# Patient Record
Sex: MALE | Race: WHITE | NOT HISPANIC OR LATINO | Employment: FULL TIME | URBAN - METROPOLITAN AREA
[De-identification: names, ages, dates, MRNs, and addresses within clinical notes are randomized per-mention and may not be internally consistent; named-entity substitution may affect disease eponyms.]

---

## 2018-06-28 ENCOUNTER — OFFICE VISIT (OUTPATIENT)
Dept: SURGERY | Facility: CLINIC | Age: 33
End: 2018-06-28
Payer: COMMERCIAL

## 2018-06-28 VITALS
WEIGHT: 195 LBS | HEIGHT: 71 IN | BODY MASS INDEX: 27.3 KG/M2 | DIASTOLIC BLOOD PRESSURE: 98 MMHG | SYSTOLIC BLOOD PRESSURE: 187 MMHG

## 2018-06-28 DIAGNOSIS — K61.1 PERIRECTAL ABSCESS: Primary | ICD-10-CM

## 2018-06-28 PROCEDURE — 99024 POSTOP FOLLOW-UP VISIT: CPT | Mod: ,,, | Performed by: SURGERY

## 2018-06-28 RX ORDER — HYDROCODONE BITARTRATE AND ACETAMINOPHEN 5; 325 MG/1; MG/1
TABLET ORAL
Refills: 0 | COMMUNITY
Start: 2018-06-25 | End: 2020-06-12 | Stop reason: ALTCHOICE

## 2018-07-17 NOTE — PROGRESS NOTES
Subjective:       Patient ID: Irving Tanner is a 33 y.o. male.    Chief Complaint: Post-op Evaluation (FU DOS 6/22/18- I&D Perirectal abscess )      HPI:  Patient is status post incision and drainage of perirectal abscess. He is feeling better. Drainage is much less. He is afebrile    Review of Systems   Constitutional: Negative for appetite change, chills, fever and unexpected weight change.   HENT: Negative for hearing loss, rhinorrhea, sore throat and voice change.    Eyes: Negative for photophobia and visual disturbance.   Respiratory: Negative for cough, choking and shortness of breath.    Cardiovascular: Negative for chest pain, palpitations and leg swelling.   Gastrointestinal: Negative for abdominal pain, blood in stool, constipation, diarrhea, nausea and vomiting.   Endocrine: Negative for cold intolerance, heat intolerance, polydipsia and polyuria.   Musculoskeletal: Negative for arthralgias, back pain, joint swelling and neck stiffness.   Skin: Positive for wound. Negative for color change, pallor and rash.   Neurological: Negative for dizziness, seizures, syncope and headaches.   Hematological: Negative for adenopathy. Does not bruise/bleed easily.   Psychiatric/Behavioral: Negative for agitation, behavioral problems and confusion.       Objective:      Physical Exam   Constitutional: He is oriented to person, place, and time. He is cooperative. No distress.   Pulmonary/Chest: Effort normal. No respiratory distress.   Neurological: He is alert and oriented to person, place, and time.   Skin:   Wound looks like it's healing well. Tenderness is resolved. There is no evidence of infection, hematoma or seroma.        Assessment/Plan:   Perirectal abscess      Drains removed. Doing well. Return to clinic PRN     Follow-up if symptoms worsen or fail to improve.

## 2019-03-28 ENCOUNTER — OCCUPATIONAL HEALTH (OUTPATIENT)
Dept: URGENT CARE | Facility: CLINIC | Age: 34
End: 2019-03-28

## 2019-03-28 PROCEDURE — 99499 COAST GUARD PHYSICAL: ICD-10-PCS | Mod: S$GLB,,, | Performed by: NURSE PRACTITIONER

## 2019-03-28 PROCEDURE — 99499 UNLISTED E&M SERVICE: CPT | Mod: S$GLB,,, | Performed by: NURSE PRACTITIONER

## 2020-06-11 ENCOUNTER — HOSPITAL ENCOUNTER (EMERGENCY)
Facility: HOSPITAL | Age: 35
Discharge: LEFT BEFORE DISCHARGE | End: 2020-06-12
Attending: EMERGENCY MEDICINE
Payer: COMMERCIAL

## 2020-06-11 DIAGNOSIS — Z53.29 LEFT AGAINST MEDICAL ADVICE: Primary | ICD-10-CM

## 2020-06-11 DIAGNOSIS — R10.13 EPIGASTRIC PAIN: ICD-10-CM

## 2020-06-11 DIAGNOSIS — M62.82 NON-TRAUMATIC RHABDOMYOLYSIS: ICD-10-CM

## 2020-06-11 LAB
ALBUMIN SERPL BCP-MCNC: 5 G/DL (ref 3.5–5.2)
ALP SERPL-CCNC: 56 U/L (ref 55–135)
ALT SERPL W/O P-5'-P-CCNC: 26 U/L (ref 10–44)
AMPHET+METHAMPHET UR QL: NEGATIVE
ANION GAP SERPL CALC-SCNC: 13 MMOL/L (ref 8–16)
AST SERPL-CCNC: 29 U/L (ref 10–40)
BACTERIA #/AREA URNS HPF: NEGATIVE /HPF
BARBITURATES UR QL SCN>200 NG/ML: NEGATIVE
BASOPHILS # BLD AUTO: 0.07 K/UL (ref 0–0.2)
BASOPHILS NFR BLD: 0.5 % (ref 0–1.9)
BENZODIAZ UR QL SCN>200 NG/ML: NEGATIVE
BILIRUB SERPL-MCNC: 0.9 MG/DL (ref 0.1–1)
BILIRUB UR QL STRIP: ABNORMAL
BUN SERPL-MCNC: 16 MG/DL (ref 6–20)
BZE UR QL SCN: NEGATIVE
CALCIUM SERPL-MCNC: 9.7 MG/DL (ref 8.7–10.5)
CANNABINOIDS UR QL SCN: NEGATIVE
CHLORIDE SERPL-SCNC: 99 MMOL/L (ref 95–110)
CK MB SERPL-MCNC: 5.4 NG/ML (ref 0.1–6.5)
CK SERPL-CCNC: 944 U/L (ref 20–200)
CLARITY UR: CLEAR
CO2 SERPL-SCNC: 25 MMOL/L (ref 23–29)
COLOR UR: YELLOW
CREAT SERPL-MCNC: 1.3 MG/DL (ref 0.5–1.4)
CREAT UR-MCNC: 457 MG/DL (ref 23–375)
DIFFERENTIAL METHOD: ABNORMAL
EOSINOPHIL # BLD AUTO: 0.1 K/UL (ref 0–0.5)
EOSINOPHIL NFR BLD: 0.7 % (ref 0–8)
ERYTHROCYTE [DISTWIDTH] IN BLOOD BY AUTOMATED COUNT: 13.1 % (ref 11.5–14.5)
EST. GFR  (AFRICAN AMERICAN): >60 ML/MIN/1.73 M^2
EST. GFR  (NON AFRICAN AMERICAN): >60 ML/MIN/1.73 M^2
GLUCOSE SERPL-MCNC: 88 MG/DL (ref 70–110)
GLUCOSE UR QL STRIP: NEGATIVE
HCT VFR BLD AUTO: 53 % (ref 40–54)
HGB BLD-MCNC: 18.2 G/DL (ref 14–18)
HGB UR QL STRIP: NEGATIVE
HYALINE CASTS #/AREA URNS LPF: 22 /LPF
IMM GRANULOCYTES # BLD AUTO: 0.04 K/UL (ref 0–0.04)
IMM GRANULOCYTES NFR BLD AUTO: 0.3 % (ref 0–0.5)
KETONES UR QL STRIP: ABNORMAL
LEUKOCYTE ESTERASE UR QL STRIP: NEGATIVE
LIPASE SERPL-CCNC: 31 U/L (ref 4–60)
LYMPHOCYTES # BLD AUTO: 2.7 K/UL (ref 1–4.8)
LYMPHOCYTES NFR BLD: 19.9 % (ref 18–48)
MCH RBC QN AUTO: 32.8 PG (ref 27–31)
MCHC RBC AUTO-ENTMCNC: 34.3 G/DL (ref 32–36)
MCV RBC AUTO: 96 FL (ref 82–98)
MICROSCOPIC COMMENT: ABNORMAL
MONOCYTES # BLD AUTO: 0.7 K/UL (ref 0.3–1)
MONOCYTES NFR BLD: 5.5 % (ref 4–15)
NEUTROPHILS # BLD AUTO: 9.8 K/UL (ref 1.8–7.7)
NEUTROPHILS NFR BLD: 73.1 % (ref 38–73)
NITRITE UR QL STRIP: NEGATIVE
NRBC BLD-RTO: 0 /100 WBC
OPIATES UR QL SCN: NEGATIVE
PCP UR QL SCN>25 NG/ML: NEGATIVE
PH UR STRIP: 6 [PH] (ref 5–8)
PLATELET # BLD AUTO: 297 K/UL (ref 150–350)
PMV BLD AUTO: 9.6 FL (ref 9.2–12.9)
POTASSIUM SERPL-SCNC: 3.8 MMOL/L (ref 3.5–5.1)
PROT SERPL-MCNC: 8.3 G/DL (ref 6–8.4)
PROT UR QL STRIP: ABNORMAL
RBC # BLD AUTO: 5.55 M/UL (ref 4.6–6.2)
RBC #/AREA URNS HPF: 7 /HPF (ref 0–4)
SARS-COV-2 RDRP RESP QL NAA+PROBE: NEGATIVE
SODIUM SERPL-SCNC: 137 MMOL/L (ref 136–145)
SP GR UR STRIP: >1.03 (ref 1–1.03)
SQUAMOUS #/AREA URNS HPF: 2 /HPF
TOXICOLOGY INFORMATION: ABNORMAL
URN SPEC COLLECT METH UR: ABNORMAL
UROBILINOGEN UR STRIP-ACNC: ABNORMAL EU/DL
WBC # BLD AUTO: 13.35 K/UL (ref 3.9–12.7)
WBC #/AREA URNS HPF: 2 /HPF (ref 0–5)

## 2020-06-11 PROCEDURE — U0002 COVID-19 LAB TEST NON-CDC: HCPCS

## 2020-06-11 PROCEDURE — 83690 ASSAY OF LIPASE: CPT

## 2020-06-11 PROCEDURE — 96376 TX/PRO/DX INJ SAME DRUG ADON: CPT

## 2020-06-11 PROCEDURE — 25500020 PHARM REV CODE 255: Performed by: EMERGENCY MEDICINE

## 2020-06-11 PROCEDURE — 82553 CREATINE MB FRACTION: CPT

## 2020-06-11 PROCEDURE — 85025 COMPLETE CBC W/AUTO DIFF WBC: CPT

## 2020-06-11 PROCEDURE — 63600175 PHARM REV CODE 636 W HCPCS: Performed by: EMERGENCY MEDICINE

## 2020-06-11 PROCEDURE — 80053 COMPREHEN METABOLIC PANEL: CPT

## 2020-06-11 PROCEDURE — 36415 COLL VENOUS BLD VENIPUNCTURE: CPT

## 2020-06-11 PROCEDURE — 82550 ASSAY OF CK (CPK): CPT

## 2020-06-11 PROCEDURE — 93005 ELECTROCARDIOGRAM TRACING: CPT | Performed by: INTERNAL MEDICINE

## 2020-06-11 PROCEDURE — 96374 THER/PROPH/DIAG INJ IV PUSH: CPT | Mod: 59

## 2020-06-11 PROCEDURE — 81001 URINALYSIS AUTO W/SCOPE: CPT | Mod: 59

## 2020-06-11 PROCEDURE — 80307 DRUG TEST PRSMV CHEM ANLYZR: CPT

## 2020-06-11 PROCEDURE — 96375 TX/PRO/DX INJ NEW DRUG ADDON: CPT

## 2020-06-11 PROCEDURE — 99285 EMERGENCY DEPT VISIT HI MDM: CPT | Mod: 25

## 2020-06-11 PROCEDURE — 96361 HYDRATE IV INFUSION ADD-ON: CPT

## 2020-06-11 PROCEDURE — 25000003 PHARM REV CODE 250: Performed by: EMERGENCY MEDICINE

## 2020-06-11 RX ORDER — ONDANSETRON 2 MG/ML
4 INJECTION INTRAMUSCULAR; INTRAVENOUS
Status: COMPLETED | OUTPATIENT
Start: 2020-06-11 | End: 2020-06-11

## 2020-06-11 RX ORDER — MORPHINE SULFATE 2 MG/ML
2 INJECTION, SOLUTION INTRAMUSCULAR; INTRAVENOUS
Status: COMPLETED | OUTPATIENT
Start: 2020-06-11 | End: 2020-06-11

## 2020-06-11 RX ORDER — HYOSCYAMINE SULFATE 0.12 MG/1
0.12 TABLET SUBLINGUAL EVERY 4 HOURS PRN
Status: DISCONTINUED | OUTPATIENT
Start: 2020-06-11 | End: 2020-06-12 | Stop reason: HOSPADM

## 2020-06-11 RX ADMIN — IOHEXOL 100 ML: 350 INJECTION, SOLUTION INTRAVENOUS at 09:06

## 2020-06-11 RX ADMIN — ONDANSETRON HYDROCHLORIDE 4 MG: 2 SOLUTION INTRAMUSCULAR; INTRAVENOUS at 09:06

## 2020-06-11 RX ADMIN — SODIUM CHLORIDE 1000 ML: 0.9 INJECTION, SOLUTION INTRAVENOUS at 09:06

## 2020-06-11 RX ADMIN — SODIUM CHLORIDE 1000 ML: 0.9 INJECTION, SOLUTION INTRAVENOUS at 10:06

## 2020-06-11 RX ADMIN — ONDANSETRON 4 MG: 2 INJECTION INTRAMUSCULAR; INTRAVENOUS at 08:06

## 2020-06-11 RX ADMIN — HYOSCYAMINE SULFATE 0.12 MG: 0.12 TABLET ORAL at 08:06

## 2020-06-11 RX ADMIN — MORPHINE SULFATE 2 MG: 2 INJECTION, SOLUTION INTRAMUSCULAR; INTRAVENOUS at 09:06

## 2020-06-12 ENCOUNTER — HOSPITAL ENCOUNTER (EMERGENCY)
Facility: HOSPITAL | Age: 35
Discharge: HOME | End: 2020-06-12
Attending: EMERGENCY MEDICINE
Payer: COMMERCIAL

## 2020-06-12 VITALS
WEIGHT: 195 LBS | HEART RATE: 70 BPM | RESPIRATION RATE: 18 BRPM | HEIGHT: 70 IN | DIASTOLIC BLOOD PRESSURE: 86 MMHG | TEMPERATURE: 98 F | OXYGEN SATURATION: 99 % | BODY MASS INDEX: 27.92 KG/M2 | SYSTOLIC BLOOD PRESSURE: 116 MMHG

## 2020-06-12 VITALS
HEART RATE: 82 BPM | RESPIRATION RATE: 16 BRPM | BODY MASS INDEX: 27.92 KG/M2 | DIASTOLIC BLOOD PRESSURE: 78 MMHG | TEMPERATURE: 98 F | HEIGHT: 70 IN | WEIGHT: 195 LBS | OXYGEN SATURATION: 98 % | SYSTOLIC BLOOD PRESSURE: 155 MMHG

## 2020-06-12 DIAGNOSIS — R10.11 RUQ ABDOMINAL PAIN: Primary | ICD-10-CM

## 2020-06-12 LAB
ALBUMIN SERPL BCP-MCNC: 4 G/DL (ref 3.5–5.2)
ALP SERPL-CCNC: 54 U/L (ref 55–135)
ALT SERPL W/O P-5'-P-CCNC: 21 U/L (ref 10–44)
ANION GAP SERPL CALC-SCNC: 12 MMOL/L (ref 8–16)
AST SERPL-CCNC: 18 U/L (ref 10–40)
BASOPHILS # BLD AUTO: 0.07 K/UL (ref 0–0.2)
BASOPHILS NFR BLD: 0.6 % (ref 0–1.9)
BILIRUB SERPL-MCNC: 0.4 MG/DL (ref 0.1–1)
BUN SERPL-MCNC: 9 MG/DL (ref 6–20)
CALCIUM SERPL-MCNC: 8.8 MG/DL (ref 8.7–10.5)
CHLORIDE SERPL-SCNC: 104 MMOL/L (ref 95–110)
CK SERPL-CCNC: 455 U/L (ref 20–200)
CK SERPL-CCNC: 692 U/L (ref 20–200)
CO2 SERPL-SCNC: 22 MMOL/L (ref 23–29)
CREAT SERPL-MCNC: 0.9 MG/DL (ref 0.5–1.4)
DIFFERENTIAL METHOD: ABNORMAL
EOSINOPHIL # BLD AUTO: 0.1 K/UL (ref 0–0.5)
EOSINOPHIL NFR BLD: 0.9 % (ref 0–8)
ERYTHROCYTE [DISTWIDTH] IN BLOOD BY AUTOMATED COUNT: 13.1 % (ref 11.5–14.5)
EST. GFR  (AFRICAN AMERICAN): >60 ML/MIN/1.73 M^2
EST. GFR  (NON AFRICAN AMERICAN): >60 ML/MIN/1.73 M^2
GLUCOSE SERPL-MCNC: 94 MG/DL (ref 70–110)
HCT VFR BLD AUTO: 52.4 % (ref 40–54)
HGB BLD-MCNC: 17 G/DL (ref 14–18)
IMM GRANULOCYTES # BLD AUTO: 0.03 K/UL (ref 0–0.04)
IMM GRANULOCYTES NFR BLD AUTO: 0.2 % (ref 0–0.5)
LYMPHOCYTES # BLD AUTO: 2.4 K/UL (ref 1–4.8)
LYMPHOCYTES NFR BLD: 19.2 % (ref 18–48)
MCH RBC QN AUTO: 31.7 PG (ref 27–31)
MCHC RBC AUTO-ENTMCNC: 32.4 G/DL (ref 32–36)
MCV RBC AUTO: 98 FL (ref 82–98)
MONOCYTES # BLD AUTO: 0.7 K/UL (ref 0.3–1)
MONOCYTES NFR BLD: 6 % (ref 4–15)
NEUTROPHILS # BLD AUTO: 8.9 K/UL (ref 1.8–7.7)
NEUTROPHILS NFR BLD: 73.1 % (ref 38–73)
NRBC BLD-RTO: 0 /100 WBC
PLATELET # BLD AUTO: 274 K/UL (ref 150–350)
PMV BLD AUTO: 9.7 FL (ref 9.2–12.9)
POTASSIUM SERPL-SCNC: 4.9 MMOL/L (ref 3.5–5.1)
PROT SERPL-MCNC: 7.3 G/DL (ref 6–8.4)
RBC # BLD AUTO: 5.37 M/UL (ref 4.6–6.2)
SODIUM SERPL-SCNC: 138 MMOL/L (ref 136–145)
WBC # BLD AUTO: 12.21 K/UL (ref 3.9–12.7)

## 2020-06-12 PROCEDURE — 36415 COLL VENOUS BLD VENIPUNCTURE: CPT

## 2020-06-12 PROCEDURE — 82550 ASSAY OF CK (CPK): CPT

## 2020-06-12 PROCEDURE — 82550 ASSAY OF CK (CPK): CPT | Mod: 91

## 2020-06-12 PROCEDURE — 96360 HYDRATION IV INFUSION INIT: CPT

## 2020-06-12 PROCEDURE — 85025 COMPLETE CBC W/AUTO DIFF WBC: CPT

## 2020-06-12 PROCEDURE — 99283 EMERGENCY DEPT VISIT LOW MDM: CPT | Mod: 25

## 2020-06-12 PROCEDURE — 25000003 PHARM REV CODE 250: Performed by: EMERGENCY MEDICINE

## 2020-06-12 PROCEDURE — 80053 COMPREHEN METABOLIC PANEL: CPT

## 2020-06-12 RX ORDER — ACETAMINOPHEN 500 MG
1000 TABLET ORAL
Status: DISCONTINUED | OUTPATIENT
Start: 2020-06-12 | End: 2020-06-12 | Stop reason: HOSPADM

## 2020-06-12 RX ORDER — ACETAMINOPHEN 325 MG/1
650 TABLET ORAL
Status: COMPLETED | OUTPATIENT
Start: 2020-06-12 | End: 2020-06-12

## 2020-06-12 RX ORDER — MAG HYDROX/ALUMINUM HYD/SIMETH 200-200-20
30 SUSPENSION, ORAL (FINAL DOSE FORM) ORAL
Status: COMPLETED | OUTPATIENT
Start: 2020-06-12 | End: 2020-06-12

## 2020-06-12 RX ADMIN — ALUMINUM HYDROXIDE, MAGNESIUM HYDROXIDE, AND SIMETHICONE 30 ML: 200; 200; 20 SUSPENSION ORAL at 06:06

## 2020-06-12 RX ADMIN — SODIUM CHLORIDE 1000 ML: 0.9 INJECTION, SOLUTION INTRAVENOUS at 05:06

## 2020-06-12 RX ADMIN — ACETAMINOPHEN 650 MG: 325 TABLET ORAL at 05:06

## 2020-06-12 NOTE — ED PROVIDER NOTES
Encounter Date: 6/11/2020       History     Chief Complaint   Patient presents with    Abdominal Pain    Back Pain     This is a 35-year-old male who presents complaining of crampy abdominal pain and crampy right flank pain that occurred after he was working out in the sun throughout the day today.  Patient did not drink as much water as normal.  He did feel overheated.  He has had heat related cramps in the past that were similar in nature.  He reports some decreased but not painful urination.  He denies any chest pain or shortness of breath.  He denies any fever chills coughing any known COVID-19 positive contacts.  He denies headache or visual changes.  He denies any syncope or near-syncope but did feel lightheaded after working in the heat.  He denies any focal weakness numbness tingling or paresthesias.  He denies any vomiting but he had nausea and denies diarrhea.  He feels well otherwise and he denies any other problems or complaints.  Symptoms are moderate intensity.  There are no exacerbating or alleviating factors.        Review of patient's allergies indicates:   Allergen Reactions    Motrin [ibuprofen]      swelling     No past medical history on file.  Past Surgical History:   Procedure Laterality Date    APPENDECTOMY      FOOT FRACTURE SURGERY      Incision and Drainage of Perirectal Abscess  06/22/2018         No family history on file.  Social History     Tobacco Use    Smoking status: Current Every Day Smoker     Packs/day: 0.50   Substance Use Topics    Alcohol use: No     Comment: occasionally    Drug use: No     Review of Systems   Constitutional: Negative.  Negative for activity change, appetite change, chills, diaphoresis, fatigue, fever and unexpected weight change.   HENT: Negative.  Negative for congestion, dental problem, ear pain, nosebleeds, postnasal drip, rhinorrhea, sinus pressure, sinus pain, sore throat, tinnitus, trouble swallowing and voice change.    Eyes:  Negative.  Negative for pain and visual disturbance.   Respiratory: Negative.  Negative for cough, chest tightness, shortness of breath and wheezing.    Cardiovascular: Negative.  Negative for chest pain, palpitations and leg swelling.   Gastrointestinal: Negative.  Negative for abdominal distention, abdominal pain, anal bleeding, blood in stool, constipation, diarrhea, nausea, rectal pain and vomiting.   Endocrine: Negative.    Genitourinary: Negative.  Negative for difficulty urinating, dysuria, flank pain, frequency, penile pain, scrotal swelling, testicular pain and urgency.   Musculoskeletal: Negative.  Negative for arthralgias, back pain, gait problem, joint swelling, myalgias, neck pain and neck stiffness.   Skin: Negative.  Negative for color change, pallor and rash.   Neurological: Negative.  Negative for dizziness, seizures, syncope, facial asymmetry, speech difficulty, weakness, light-headedness, numbness and headaches.   Hematological: Negative.  Does not bruise/bleed easily.   Psychiatric/Behavioral: Negative.  Negative for confusion.   All other systems reviewed and are negative.      Physical Exam     Initial Vitals [06/11/20 1912]   BP Pulse Resp Temp SpO2   (!) 161/84 69 16 99 °F (37.2 °C) 99 %      MAP       --         Physical Exam    ED Course   Procedures  Labs Reviewed   CBC W/ AUTO DIFFERENTIAL - Abnormal; Notable for the following components:       Result Value    WBC 13.35 (*)     Hemoglobin 18.2 (*)     Mean Corpuscular Hemoglobin 32.8 (*)     Gran # (ANC) 9.8 (*)     Gran% 73.1 (*)     All other components within normal limits   URINALYSIS, REFLEX TO URINE CULTURE - Abnormal; Notable for the following components:    Specific Gravity, UA >1.030 (*)     Protein, UA 1+ (*)     Ketones, UA 1+ (*)     Bilirubin (UA) 1+ (*)     Urobilinogen, UA 2.0-3.0 (*)     All other components within normal limits    Narrative:     Preferred Collection Type->Urine, Clean Catch  Specimen Source->Urine   DRUG  SCREEN PANEL, URINE EMERGENCY - Abnormal; Notable for the following components:    Creatinine, Random Ur 457.0 (*)     All other components within normal limits    Narrative:     Preferred Collection Type->Urine, Clean Catch  Specimen Source->Urine   URINALYSIS MICROSCOPIC - Abnormal; Notable for the following components:    RBC, UA 7 (*)     Hyaline Casts, UA 22 (*)     All other components within normal limits    Narrative:     Preferred Collection Type->Urine, Clean Catch  Specimen Source->Urine   CK - Abnormal; Notable for the following components:     (*)     All other components within normal limits   CK - Abnormal; Notable for the following components:     (*)     All other components within normal limits   COMPREHENSIVE METABOLIC PANEL   LIPASE   CK   SARS-COV-2 RNA AMPLIFICATION, QUAL   CK-MB          Imaging Results          CT Abdomen Pelvis With Contrast (Final result)  Result time 06/11/20 21:35:36    Final result by Ace Denis MD (06/11/20 21:35:36)                 Narrative:    CT ABDOMEN PELVIS WITH IV CONTRAST, CT CHEST ANGIOGRAPHY WITH IV CONTRAST three-dimensional reconstructions    CLINICAL STATEMENT: Abdominal distension    This exam was performed according to our departmental dose-optimization program which includes automated exposure control, adjustment of the mA and/or kVp according to patient size and/or use of iterative reconstruction technique where applicable.    FINDINGS: Pulmonary arteries are well opacified with no significant filling defects in the pulmonary arterial tree to suggest acute pulmonary embolism. Aorta is within normal limits with no aneurysm or dissection. No significant mediastinal, hilar or axillary lymphadenopathy. No pleural or pericardial effusions. Visualized upper abdominal organs are within normal limits.    Evaluation of the lung parenchyma demonstrates trachea and major airways to be patent. No suspicious lung nodules or masses. No  consolidations to suggest pneumonia. Aorta is within normal limits.    Liver, spleen, pancreas, gallbladder, adrenal glands and kidneys are within normal limits. No hydronephrosis. No dilated loops of bowel to suggest obstruction. Mild amount of stool in the colon. Mild diffuse colonic diverticulosis without CT evidence for acute diverticulitis. No free fluid or free air. Bladder is unremarkable. No abdominal or pelvic lymphadenopathy. Abdominal aorta is within normal limits.    Osseous structures do not demonstrate acute abnormalities.    IMPRESSION:    No acute pulmonary embolism. No acute chest, or abdominal pathology.    Electronically signed by:  Ace Denis MD  6/11/2020 10:09 PM CDT Workstation: [id number]                             CTA Chest Non-Coronary (PE Study) (Final result)  Result time 06/11/20 21:03:00    Final result by Ace Denis MD (06/11/20 21:03:00)                 Narrative:    CT ABDOMEN PELVIS WITH IV CONTRAST, CT CHEST ANGIOGRAPHY WITH IV CONTRAST three-dimensional reconstructions    CLINICAL STATEMENT: Abdominal distension    This exam was performed according to our departmental dose-optimization program which includes automated exposure control, adjustment of the mA and/or kVp according to patient size and/or use of iterative reconstruction technique where applicable.    FINDINGS: Pulmonary arteries are well opacified with no significant filling defects in the pulmonary arterial tree to suggest acute pulmonary embolism. Aorta is within normal limits with no aneurysm or dissection. No significant mediastinal, hilar or axillary lymphadenopathy. No pleural or pericardial effusions. Visualized upper abdominal organs are within normal limits.    Evaluation of the lung parenchyma demonstrates trachea and major airways to be patent. No suspicious lung nodules or masses. No consolidations to suggest pneumonia. Aorta is within normal limits.    Liver, spleen, pancreas, gallbladder, adrenal glands  and kidneys are within normal limits. No hydronephrosis. No dilated loops of bowel to suggest obstruction. Mild amount of stool in the colon. Mild diffuse colonic diverticulosis without CT evidence for acute diverticulitis. No free fluid or free air. Bladder is unremarkable. No abdominal or pelvic lymphadenopathy. Abdominal aorta is within normal limits.    Osseous structures do not demonstrate acute abnormalities.    IMPRESSION:    No acute pulmonary embolism. No acute chest, or abdominal pathology.    Electronically signed by:  Ace Denis MD  6/11/2020 10:09 PM CDT Workstation: [id number]                               Medical Decision Making:   Clinical Tests:   Lab Tests: Reviewed  Radiological Study: Reviewed  Medical Tests: Reviewed  ED Management:  CT the abdomen pelvis is negative for evidence of acute intra-abdominal abnormality.  CT of the chest is negative for acute lung abnormality or pulmonary embolism.  Patient with elevated CPK which has improved with IV fluids however the patient is still symptomatic.  Hemoglobin and hematocrit are elevated in conjunction with dehydration and hemoconcentration.  Patient is currently refusing any further care or treatment.  He is awake alert and oriented.  I have explained findings of possible early rhabdomyolysis.  I have explained the need for admission to the hospital for continued care and treatment.  Patient has received IV morphine for pain.  He is awake alert and oriented.  He has a normal mental status exam and no evidence of intoxication.  Risk of refusal and leaving against medical advice have been explained in detail including but not exclusive of permanent disability, kidney failure, brain damage, chronic pain heart attack and death.  Patient voices understanding.  I have urged the patient to return at any time if he changes his mind regarding our care and I have urged the patient to follow up closely outpatient with his primary care physician or have  referred him to [facility].  I have also discussed need for aggressive hydration at home and avoidance of heat exposure.  Again the patient is leaving against medical advice despite voicing understanding of the risks that have been explained and is demonstrating adequate decision making capabilities to make this decision                                 Clinical Impression:       ICD-10-CM ICD-9-CM   1. Left against medical advice Z53.20 V64.2   2. Epigastric pain R10.13 789.06   3. Non-traumatic rhabdomyolysis M62.82 728.88                                Cherrie Cary MD  06/12/20 0820

## 2020-06-12 NOTE — ED NOTES
Patient identifiers for Irving Tanner checked and correct.  LOC:  Irving Tanner is awake, alert, and aware of environment with an appropriate affect. He is oriented x 3 and speaking appropriately.  APPEARANCE:  He is resting comfortably and in no acute distress. He is clean and well groomed, patient's clothing is properly fastened.  SKIN:  The skin is warm and dry. He has normal skin turgor and moist mucus membranes. Skin is intact; no bruising or breakdown noted. Patient is very sun burned.  MUSCULOSKELETAL:  He is moving all extremities well, no obvious deformities noted. Pulses intact.   RESPIRATORY:  Airway is open and patent. Respirations are spontaneous and non-labored with normal effort and rate.  CARDIAC:  He has a normal rate and rhythm. No peripheral edema noted. Capillary refill < 3 seconds.  ABDOMEN:  No distention noted.  Soft and non-tender upon palpation, except for the upper and lower right quad. Normal bowel sound heard through out all 4 quads. Patient states that he has had sharp pain for the last 4 days.  NEUROLOGICAL:  PERRL. Facial expression is symmetrical. Hand grasps are equal bilaterally. Normal sensation in all extremities when touched with finger.  Allergies reported:    Review of patient's allergies indicates:   Allergen Reactions    Motrin [ibuprofen]      swelling     OTHER NOTES:

## 2020-06-12 NOTE — PROVIDER PROGRESS NOTES - EMERGENCY DEPT.
Emergency Department TeleTRIAGE Encounter Note      CHIEF COMPLAINT    Chief Complaint   Patient presents with    Abdominal Pain     right side     Flank Pain     right       VITAL SIGNS   Initial Vitals [06/12/20 1535]   BP Pulse Resp Temp SpO2   116/86 70 18 98.2 °F (36.8 °C) 99 %      MAP       --            ALLERGIES    Review of patient's allergies indicates:   Allergen Reactions    Motrin [ibuprofen]      swelling       PROVIDER TRIAGE NOTE  Patient with no significant past medical history presents to the ED for evaluation of right-sided flank pain.  Patient states he has had right-sided flank pain for the past 5 days.  He denies hematuria, difficulty urinating, nausea, vomiting, diarrhea.  Patient was seen at side at [facility] last night with extensive workup including labs and CTA of his chest as well as a CT abdomen and pelvis with contrast.  Initial CK was 944 ended improved to 692 after 2 L of IV fluids.  Patient left AMA at that point.    Patient states his pain has not improved since being seen last night.  He has not taken any medications to help with his symptoms.    ORDERS  Labs Reviewed - No data to display    ED Orders (720h ago, onward)    Start Ordered     Status Ordering Provider    06/12/20 1706 06/12/20 1705  Complete Blood Count (CBC)  STAT      Pending Collection KRISTIN SNOW G.    06/12/20 1706 06/12/20 1705  Comprehensive Metabolic Panel (CMP)  STAT      Pending Collection KRISTIN SNOW G.    06/12/20 1706 06/12/20 1705  Insert Saline lock IV  Once      Ordered KRISTIN SNOW G.    06/12/20 1706 06/12/20 1705  CPK  STAT      Pending Collection KRISTIN SNOW G.    06/12/20 1706 06/12/20 1705  Urinalysis, Reflex to Urine Culture Urine, Clean Catch  STAT      Ordered KRISTIN SNOW G.            Virtual Visit Note: The provider triage portion of this emergency department evaluation and documentation was performed via Personally, a HIPAA-compliant telemedicine application, in concert with a  tele-presenter in the room. A face to face patient evaluation with one of my colleagues will occur once the patient is placed in an emergency department room.      DISCLAIMER: This note was prepared with [employer] voice recognition transcription software. Garbled syntax, mangled pronouns, and other bizarre constructions may be attributed to that software system.

## 2020-06-12 NOTE — DISCHARGE INSTRUCTIONS
You are Leaving against medical advice.  You are risking permanent disability, kidney damage, other organ damage chronic pain, brain damage and death.  Return at any time if you change your mind regarding our care and recommendations.  Avoid any overheating or heat exposure.  Keep well hydrated--drink 8 glasses of water daily.  Tylenol over-the-counter as directed if needed for pain.  Important to see your primary care physician or access Health tomorrow.  Return at any time if you change her mind regarding our care or if you have any problems or concerns.

## 2021-05-10 ENCOUNTER — HOSPITAL ENCOUNTER (EMERGENCY)
Facility: HOSPITAL | Age: 36
Discharge: HOME | End: 2021-05-10
Attending: EMERGENCY MEDICINE
Payer: COMMERCIAL

## 2021-05-10 VITALS
HEIGHT: 71 IN | SYSTOLIC BLOOD PRESSURE: 188 MMHG | TEMPERATURE: 99 F | WEIGHT: 195 LBS | HEART RATE: 58 BPM | BODY MASS INDEX: 27.3 KG/M2 | RESPIRATION RATE: 16 BRPM | OXYGEN SATURATION: 96 % | DIASTOLIC BLOOD PRESSURE: 87 MMHG

## 2021-05-10 DIAGNOSIS — M77.8 ENTHESOPATHY OF FOOT: ICD-10-CM

## 2021-05-10 DIAGNOSIS — R52 PAIN: ICD-10-CM

## 2021-05-10 DIAGNOSIS — M79.671 PAIN OF BOTH HEELS: Primary | ICD-10-CM

## 2021-05-10 DIAGNOSIS — M79.672 PAIN OF BOTH HEELS: Primary | ICD-10-CM

## 2021-05-10 DIAGNOSIS — R03.0 ELEVATED BLOOD PRESSURE READING: ICD-10-CM

## 2021-05-10 PROCEDURE — 96372 THER/PROPH/DIAG INJ SC/IM: CPT

## 2021-05-10 PROCEDURE — 63600175 PHARM REV CODE 636 W HCPCS: Performed by: NURSE PRACTITIONER

## 2021-05-10 PROCEDURE — 25000003 PHARM REV CODE 250: Performed by: NURSE PRACTITIONER

## 2021-05-10 PROCEDURE — 99284 EMERGENCY DEPT VISIT MOD MDM: CPT | Mod: 25

## 2021-05-10 RX ORDER — DEXAMETHASONE SODIUM PHOSPHATE 4 MG/ML
8 INJECTION, SOLUTION INTRA-ARTICULAR; INTRALESIONAL; INTRAMUSCULAR; INTRAVENOUS; SOFT TISSUE
Status: COMPLETED | OUTPATIENT
Start: 2021-05-10 | End: 2021-05-10

## 2021-05-10 RX ORDER — METHYLPREDNISOLONE 4 MG/1
TABLET ORAL
Qty: 1 PACKAGE | Refills: 0 | Status: SHIPPED | OUTPATIENT
Start: 2021-05-10 | End: 2021-05-17

## 2021-05-10 RX ORDER — AMLODIPINE BESYLATE 5 MG/1
10 TABLET ORAL
Status: COMPLETED | OUTPATIENT
Start: 2021-05-10 | End: 2021-05-10

## 2021-05-10 RX ORDER — TRAMADOL HYDROCHLORIDE 50 MG/1
50 TABLET ORAL EVERY 6 HOURS PRN
Qty: 10 TABLET | Refills: 0 | Status: SHIPPED | OUTPATIENT
Start: 2021-05-10 | End: 2021-05-17

## 2021-05-10 RX ORDER — AMLODIPINE BESYLATE 10 MG/1
10 TABLET ORAL DAILY
Qty: 30 TABLET | Refills: 0 | Status: SHIPPED | OUTPATIENT
Start: 2021-05-10 | End: 2021-05-17 | Stop reason: ALTCHOICE

## 2021-05-10 RX ORDER — TRAMADOL HYDROCHLORIDE 50 MG/1
50 TABLET ORAL
Status: COMPLETED | OUTPATIENT
Start: 2021-05-10 | End: 2021-05-10

## 2021-05-10 RX ADMIN — DEXAMETHASONE SODIUM PHOSPHATE 8 MG: 4 INJECTION, SOLUTION INTRA-ARTICULAR; INTRALESIONAL; INTRAMUSCULAR; INTRAVENOUS; SOFT TISSUE at 02:05

## 2021-05-10 RX ADMIN — TRAMADOL HYDROCHLORIDE 50 MG: 50 TABLET, FILM COATED ORAL at 01:05

## 2021-05-10 RX ADMIN — AMLODIPINE BESYLATE 10 MG: 5 TABLET ORAL at 02:05

## 2021-05-12 ENCOUNTER — PATIENT MESSAGE (OUTPATIENT)
Dept: RESEARCH | Facility: HOSPITAL | Age: 36
End: 2021-05-12

## 2021-05-17 ENCOUNTER — OFFICE VISIT (OUTPATIENT)
Dept: PODIATRY | Facility: CLINIC | Age: 36
End: 2021-05-17
Payer: COMMERCIAL

## 2021-05-17 ENCOUNTER — OFFICE VISIT (OUTPATIENT)
Dept: FAMILY MEDICINE | Facility: CLINIC | Age: 36
End: 2021-05-17
Payer: COMMERCIAL

## 2021-05-17 VITALS
HEIGHT: 71 IN | RESPIRATION RATE: 18 BRPM | OXYGEN SATURATION: 97 % | TEMPERATURE: 98 F | BODY MASS INDEX: 26.55 KG/M2 | SYSTOLIC BLOOD PRESSURE: 142 MMHG | DIASTOLIC BLOOD PRESSURE: 92 MMHG | HEART RATE: 95 BPM | WEIGHT: 189.63 LBS

## 2021-05-17 VITALS — BODY MASS INDEX: 26.32 KG/M2 | HEIGHT: 71 IN | WEIGHT: 188 LBS

## 2021-05-17 DIAGNOSIS — M79.672 HEEL PAIN, BILATERAL: ICD-10-CM

## 2021-05-17 DIAGNOSIS — I10 ESSENTIAL HYPERTENSION: Primary | ICD-10-CM

## 2021-05-17 DIAGNOSIS — M79.671 HEEL PAIN, BILATERAL: ICD-10-CM

## 2021-05-17 DIAGNOSIS — D23.71 BENIGN NEOPLASM OF SKIN OF RIGHT LOWER EXTREMITY, INCLUDING HIP: Primary | ICD-10-CM

## 2021-05-17 DIAGNOSIS — Z11.4 SCREENING FOR HIV WITHOUT PRESENCE OF RISK FACTORS: ICD-10-CM

## 2021-05-17 DIAGNOSIS — Z29.9 PREVENTIVE MEASURE: ICD-10-CM

## 2021-05-17 DIAGNOSIS — Z13.6 ENCOUNTER FOR LIPID SCREENING FOR CARDIOVASCULAR DISEASE: ICD-10-CM

## 2021-05-17 DIAGNOSIS — Z11.59 ENCOUNTER FOR HEPATITIS C SCREENING TEST FOR LOW RISK PATIENT: ICD-10-CM

## 2021-05-17 DIAGNOSIS — Z13.220 ENCOUNTER FOR LIPID SCREENING FOR CARDIOVASCULAR DISEASE: ICD-10-CM

## 2021-05-17 PROCEDURE — 17110 PR DESTRUCTION BENIGN LESIONS UP TO 14: ICD-10-PCS | Mod: S$GLB,,, | Performed by: PODIATRIST

## 2021-05-17 PROCEDURE — 3008F BODY MASS INDEX DOCD: CPT | Mod: S$GLB,,, | Performed by: NURSE PRACTITIONER

## 2021-05-17 PROCEDURE — 1125F PR PAIN SEVERITY QUANTIFIED, PAIN PRESENT: ICD-10-PCS | Mod: S$GLB,,, | Performed by: NURSE PRACTITIONER

## 2021-05-17 PROCEDURE — 3008F PR BODY MASS INDEX (BMI) DOCUMENTED: ICD-10-PCS | Mod: CPTII,S$GLB,, | Performed by: PODIATRIST

## 2021-05-17 PROCEDURE — 99204 PR OFFICE/OUTPT VISIT, NEW, LEVL IV, 45-59 MIN: ICD-10-PCS | Mod: S$GLB,,, | Performed by: NURSE PRACTITIONER

## 2021-05-17 PROCEDURE — 99203 OFFICE O/P NEW LOW 30 MIN: CPT | Mod: 25,S$GLB,, | Performed by: PODIATRIST

## 2021-05-17 PROCEDURE — 3077F PR MOST RECENT SYSTOLIC BLOOD PRESSURE >= 140 MM HG: ICD-10-PCS | Mod: S$GLB,,, | Performed by: NURSE PRACTITIONER

## 2021-05-17 PROCEDURE — 3080F DIAST BP >= 90 MM HG: CPT | Mod: S$GLB,,, | Performed by: NURSE PRACTITIONER

## 2021-05-17 PROCEDURE — 99203 PR OFFICE/OUTPT VISIT, NEW, LEVL III, 30-44 MIN: ICD-10-PCS | Mod: 25,S$GLB,, | Performed by: PODIATRIST

## 2021-05-17 PROCEDURE — 3077F SYST BP >= 140 MM HG: CPT | Mod: S$GLB,,, | Performed by: NURSE PRACTITIONER

## 2021-05-17 PROCEDURE — 1125F AMNT PAIN NOTED PAIN PRSNT: CPT | Mod: S$GLB,,, | Performed by: PODIATRIST

## 2021-05-17 PROCEDURE — 17110 DESTRUCTION B9 LES UP TO 14: CPT | Mod: S$GLB,,, | Performed by: PODIATRIST

## 2021-05-17 PROCEDURE — 1125F PR PAIN SEVERITY QUANTIFIED, PAIN PRESENT: ICD-10-PCS | Mod: S$GLB,,, | Performed by: PODIATRIST

## 2021-05-17 PROCEDURE — 3008F BODY MASS INDEX DOCD: CPT | Mod: CPTII,S$GLB,, | Performed by: PODIATRIST

## 2021-05-17 PROCEDURE — 3080F PR MOST RECENT DIASTOLIC BLOOD PRESSURE >= 90 MM HG: ICD-10-PCS | Mod: S$GLB,,, | Performed by: NURSE PRACTITIONER

## 2021-05-17 PROCEDURE — 1125F AMNT PAIN NOTED PAIN PRSNT: CPT | Mod: S$GLB,,, | Performed by: NURSE PRACTITIONER

## 2021-05-17 PROCEDURE — 3008F PR BODY MASS INDEX (BMI) DOCUMENTED: ICD-10-PCS | Mod: S$GLB,,, | Performed by: NURSE PRACTITIONER

## 2021-05-17 PROCEDURE — 99204 OFFICE O/P NEW MOD 45 MIN: CPT | Mod: S$GLB,,, | Performed by: NURSE PRACTITIONER

## 2021-05-17 RX ORDER — AMLODIPINE AND OLMESARTAN MEDOXOMIL 5; 20 MG/1; MG/1
1 TABLET ORAL DAILY
Qty: 90 TABLET | Refills: 1 | Status: SHIPPED | OUTPATIENT
Start: 2021-05-17 | End: 2021-11-01 | Stop reason: DRUGHIGH

## 2021-05-19 ENCOUNTER — TELEPHONE (OUTPATIENT)
Dept: FAMILY MEDICINE | Facility: CLINIC | Age: 36
End: 2021-05-19

## 2021-05-19 DIAGNOSIS — E78.2 MIXED HYPERLIPIDEMIA: Primary | ICD-10-CM

## 2021-05-19 LAB
ALBUMIN SERPL-MCNC: 4.5 G/DL (ref 4–5)
ALBUMIN/GLOB SERPL: 1.6 {RATIO} (ref 1.2–2.2)
ALP SERPL-CCNC: 54 IU/L (ref 48–121)
ALT SERPL-CCNC: 24 IU/L (ref 0–44)
APPEARANCE UR: CLEAR
AST SERPL-CCNC: 20 IU/L (ref 0–40)
BASOPHILS # BLD AUTO: 0.1 X10E3/UL (ref 0–0.2)
BASOPHILS NFR BLD AUTO: 1 %
BILIRUB SERPL-MCNC: 0.5 MG/DL (ref 0–1.2)
BILIRUB UR QL STRIP: NEGATIVE
BUN SERPL-MCNC: 13 MG/DL (ref 6–20)
BUN/CREAT SERPL: 14 (ref 9–20)
CALCIUM SERPL-MCNC: 9.2 MG/DL (ref 8.7–10.2)
CHLORIDE SERPL-SCNC: 99 MMOL/L (ref 96–106)
CHOLEST SERPL-MCNC: 222 MG/DL (ref 100–199)
CO2 SERPL-SCNC: 25 MMOL/L (ref 20–29)
COLOR UR: YELLOW
CREAT SERPL-MCNC: 0.94 MG/DL (ref 0.76–1.27)
EOSINOPHIL # BLD AUTO: 0.3 X10E3/UL (ref 0–0.4)
EOSINOPHIL NFR BLD AUTO: 3 %
ERYTHROCYTE [DISTWIDTH] IN BLOOD BY AUTOMATED COUNT: 15.4 % (ref 11.6–15.4)
GLOBULIN SER CALC-MCNC: 2.8 G/DL (ref 1.5–4.5)
GLUCOSE SERPL-MCNC: 82 MG/DL (ref 65–99)
GLUCOSE UR QL: NEGATIVE
HCT VFR BLD AUTO: 60.4 % (ref 37.5–51)
HCV AB S/CO SERPL IA: <0.1 S/CO RATIO (ref 0–0.9)
HDLC SERPL-MCNC: 42 MG/DL
HGB BLD-MCNC: 19.5 G/DL (ref 13–17.7)
HGB UR QL STRIP: NEGATIVE
HIV 1+2 AB+HIV1 P24 AG SERPL QL IA: NON REACTIVE
IMM GRANULOCYTES # BLD AUTO: 0.1 X10E3/UL (ref 0–0.1)
IMM GRANULOCYTES NFR BLD AUTO: 1 %
KETONES UR QL STRIP: NEGATIVE
LDLC SERPL CALC-MCNC: 132 MG/DL (ref 0–99)
LEUKOCYTE ESTERASE UR QL STRIP: NEGATIVE
LYMPHOCYTES # BLD AUTO: 3.4 X10E3/UL (ref 0.7–3.1)
LYMPHOCYTES NFR BLD AUTO: 30 %
MCH RBC QN AUTO: 30.7 PG (ref 26.6–33)
MCHC RBC AUTO-ENTMCNC: 32.3 G/DL (ref 31.5–35.7)
MCV RBC AUTO: 95 FL (ref 79–97)
MICRO URNS: NORMAL
MONOCYTES # BLD AUTO: 0.8 X10E3/UL (ref 0.1–0.9)
MONOCYTES NFR BLD AUTO: 7 %
NEUTROPHILS # BLD AUTO: 6.4 X10E3/UL (ref 1.4–7)
NEUTROPHILS NFR BLD AUTO: 58 %
NITRITE UR QL STRIP: NEGATIVE
PH UR STRIP: 6 [PH] (ref 5–7.5)
PLATELET # BLD AUTO: 329 X10E3/UL (ref 150–450)
POTASSIUM SERPL-SCNC: 4.9 MMOL/L (ref 3.5–5.2)
PROT SERPL-MCNC: 7.3 G/DL (ref 6–8.5)
PROT UR QL STRIP: NEGATIVE
RBC # BLD AUTO: 6.35 X10E6/UL (ref 4.14–5.8)
SODIUM SERPL-SCNC: 136 MMOL/L (ref 134–144)
SP GR UR: 1.02 (ref 1–1.03)
TRIGL SERPL-MCNC: 271 MG/DL (ref 0–149)
TSH SERPL DL<=0.005 MIU/L-ACNC: 2.48 UIU/ML (ref 0.45–4.5)
UROBILINOGEN UR STRIP-MCNC: 0.2 MG/DL (ref 0.2–1)
VLDLC SERPL CALC-MCNC: 48 MG/DL (ref 5–40)
WBC # BLD AUTO: 11.1 X10E3/UL (ref 3.4–10.8)

## 2021-05-19 RX ORDER — ROSUVASTATIN CALCIUM 10 MG/1
10 TABLET, COATED ORAL NIGHTLY
Qty: 90 TABLET | Refills: 1 | Status: SHIPPED | OUTPATIENT
Start: 2021-05-19 | End: 2021-11-01 | Stop reason: SDUPTHER

## 2021-06-01 ENCOUNTER — OFFICE VISIT (OUTPATIENT)
Dept: PODIATRY | Facility: CLINIC | Age: 36
End: 2021-06-01
Payer: COMMERCIAL

## 2021-06-01 VITALS — BODY MASS INDEX: 26.44 KG/M2 | OXYGEN SATURATION: 95 % | HEART RATE: 85 BPM | HEIGHT: 71 IN

## 2021-06-01 DIAGNOSIS — M79.672 HEEL PAIN, BILATERAL: ICD-10-CM

## 2021-06-01 DIAGNOSIS — D23.72 BENIGN NEOPLASM OF SKIN OF LEFT LOWER EXTREMITY, INCLUDING HIP: ICD-10-CM

## 2021-06-01 DIAGNOSIS — D23.71 BENIGN NEOPLASM OF SKIN OF RIGHT LOWER EXTREMITY, INCLUDING HIP: Primary | ICD-10-CM

## 2021-06-01 DIAGNOSIS — M79.671 HEEL PAIN, BILATERAL: ICD-10-CM

## 2021-06-01 PROCEDURE — 3008F BODY MASS INDEX DOCD: CPT | Mod: CPTII,S$GLB,, | Performed by: PODIATRIST

## 2021-06-01 PROCEDURE — 1125F PR PAIN SEVERITY QUANTIFIED, PAIN PRESENT: ICD-10-PCS | Mod: S$GLB,,, | Performed by: PODIATRIST

## 2021-06-01 PROCEDURE — 99213 OFFICE O/P EST LOW 20 MIN: CPT | Mod: S$GLB,,, | Performed by: PODIATRIST

## 2021-06-01 PROCEDURE — 99213 PR OFFICE/OUTPT VISIT, EST, LEVL III, 20-29 MIN: ICD-10-PCS | Mod: S$GLB,,, | Performed by: PODIATRIST

## 2021-06-01 PROCEDURE — 3008F PR BODY MASS INDEX (BMI) DOCUMENTED: ICD-10-PCS | Mod: CPTII,S$GLB,, | Performed by: PODIATRIST

## 2021-06-01 PROCEDURE — 1125F AMNT PAIN NOTED PAIN PRSNT: CPT | Mod: S$GLB,,, | Performed by: PODIATRIST

## 2021-10-05 ENCOUNTER — TELEPHONE (OUTPATIENT)
Dept: FAMILY MEDICINE | Facility: CLINIC | Age: 36
End: 2021-10-05

## 2021-10-27 ENCOUNTER — OCCUPATIONAL HEALTH (OUTPATIENT)
Dept: URGENT CARE | Facility: CLINIC | Age: 36
End: 2021-10-27

## 2021-10-27 DIAGNOSIS — Z00.00 PHYSICAL EXAM: Primary | ICD-10-CM

## 2021-10-27 PROCEDURE — 99499 UNLISTED E&M SERVICE: CPT | Mod: S$GLB,,, | Performed by: NURSE PRACTITIONER

## 2021-10-27 PROCEDURE — 99499 COAST GUARD PHYSICAL: ICD-10-PCS | Mod: S$GLB,,, | Performed by: NURSE PRACTITIONER

## 2021-11-01 ENCOUNTER — OFFICE VISIT (OUTPATIENT)
Dept: FAMILY MEDICINE | Facility: CLINIC | Age: 36
End: 2021-11-01
Payer: COMMERCIAL

## 2021-11-01 VITALS
HEART RATE: 95 BPM | BODY MASS INDEX: 26.88 KG/M2 | OXYGEN SATURATION: 96 % | TEMPERATURE: 99 F | WEIGHT: 192 LBS | DIASTOLIC BLOOD PRESSURE: 70 MMHG | SYSTOLIC BLOOD PRESSURE: 142 MMHG | HEIGHT: 71 IN

## 2021-11-01 DIAGNOSIS — I10 ESSENTIAL HYPERTENSION: ICD-10-CM

## 2021-11-01 DIAGNOSIS — E78.2 MIXED HYPERLIPIDEMIA: ICD-10-CM

## 2021-11-01 DIAGNOSIS — I49.9 CARDIAC ARRHYTHMIA, UNSPECIFIED CARDIAC ARRHYTHMIA TYPE: Primary | ICD-10-CM

## 2021-11-01 LAB
EKG 12-LEAD: NORMAL
PR INTERVAL: NORMAL
PRT AXES: NORMAL
QRS DURATION: NORMAL
QT/QTC: NORMAL
VENTRICULAR RATE: NORMAL

## 2021-11-01 PROCEDURE — 4010F ACE/ARB THERAPY RXD/TAKEN: CPT | Mod: S$GLB,,, | Performed by: NURSE PRACTITIONER

## 2021-11-01 PROCEDURE — 3077F SYST BP >= 140 MM HG: CPT | Mod: S$GLB,,, | Performed by: NURSE PRACTITIONER

## 2021-11-01 PROCEDURE — 99214 PR OFFICE/OUTPT VISIT, EST, LEVL IV, 30-39 MIN: ICD-10-PCS | Mod: 25,S$GLB,, | Performed by: NURSE PRACTITIONER

## 2021-11-01 PROCEDURE — 3077F PR MOST RECENT SYSTOLIC BLOOD PRESSURE >= 140 MM HG: ICD-10-PCS | Mod: S$GLB,,, | Performed by: NURSE PRACTITIONER

## 2021-11-01 PROCEDURE — 3078F PR MOST RECENT DIASTOLIC BLOOD PRESSURE < 80 MM HG: ICD-10-PCS | Mod: S$GLB,,, | Performed by: NURSE PRACTITIONER

## 2021-11-01 PROCEDURE — 93000 ELECTROCARDIOGRAM COMPLETE: CPT | Mod: S$GLB,,, | Performed by: NURSE PRACTITIONER

## 2021-11-01 PROCEDURE — 3078F DIAST BP <80 MM HG: CPT | Mod: S$GLB,,, | Performed by: NURSE PRACTITIONER

## 2021-11-01 PROCEDURE — 99214 OFFICE O/P EST MOD 30 MIN: CPT | Mod: 25,S$GLB,, | Performed by: NURSE PRACTITIONER

## 2021-11-01 PROCEDURE — 1160F RVW MEDS BY RX/DR IN RCRD: CPT | Mod: S$GLB,,, | Performed by: NURSE PRACTITIONER

## 2021-11-01 PROCEDURE — 93000 POCT EKG 12-LEAD: ICD-10-PCS | Mod: S$GLB,,, | Performed by: NURSE PRACTITIONER

## 2021-11-01 PROCEDURE — 4010F PR ACE/ARB THEARPY RXD/TAKEN: ICD-10-PCS | Mod: S$GLB,,, | Performed by: NURSE PRACTITIONER

## 2021-11-01 PROCEDURE — 3008F PR BODY MASS INDEX (BMI) DOCUMENTED: ICD-10-PCS | Mod: S$GLB,,, | Performed by: NURSE PRACTITIONER

## 2021-11-01 PROCEDURE — 3008F BODY MASS INDEX DOCD: CPT | Mod: S$GLB,,, | Performed by: NURSE PRACTITIONER

## 2021-11-01 PROCEDURE — 1160F PR REVIEW ALL MEDS BY PRESCRIBER/CLIN PHARMACIST DOCUMENTED: ICD-10-PCS | Mod: S$GLB,,, | Performed by: NURSE PRACTITIONER

## 2021-11-01 RX ORDER — METOPROLOL SUCCINATE 25 MG/1
25 TABLET, EXTENDED RELEASE ORAL NIGHTLY
Qty: 90 TABLET | Refills: 1 | Status: SHIPPED | OUTPATIENT
Start: 2021-11-01 | End: 2022-03-07 | Stop reason: SDUPTHER

## 2021-11-01 RX ORDER — AMLODIPINE AND OLMESARTAN MEDOXOMIL 5; 40 MG/1; MG/1
1 TABLET ORAL DAILY
Qty: 90 TABLET | Refills: 1 | Status: SHIPPED | OUTPATIENT
Start: 2021-11-01 | End: 2022-03-07 | Stop reason: SDUPTHER

## 2021-11-01 RX ORDER — ROSUVASTATIN CALCIUM 10 MG/1
10 TABLET, COATED ORAL NIGHTLY
Qty: 90 TABLET | Refills: 1 | Status: SHIPPED | OUTPATIENT
Start: 2021-11-01 | End: 2022-03-07 | Stop reason: SDUPTHER

## 2021-12-01 ENCOUNTER — OFFICE VISIT (OUTPATIENT)
Dept: FAMILY MEDICINE | Facility: CLINIC | Age: 36
End: 2021-12-01
Payer: COMMERCIAL

## 2021-12-01 VITALS
BODY MASS INDEX: 26.5 KG/M2 | TEMPERATURE: 98 F | SYSTOLIC BLOOD PRESSURE: 128 MMHG | HEART RATE: 85 BPM | RESPIRATION RATE: 16 BRPM | DIASTOLIC BLOOD PRESSURE: 70 MMHG | OXYGEN SATURATION: 97 % | HEIGHT: 71 IN | WEIGHT: 189.31 LBS

## 2021-12-01 DIAGNOSIS — I10 ESSENTIAL HYPERTENSION: ICD-10-CM

## 2021-12-01 DIAGNOSIS — I49.8 VENTRICULAR TRIGEMINY: Primary | ICD-10-CM

## 2021-12-01 DIAGNOSIS — F51.01 PRIMARY INSOMNIA: ICD-10-CM

## 2021-12-01 DIAGNOSIS — F17.210 CIGARETTE NICOTINE DEPENDENCE WITHOUT COMPLICATION: ICD-10-CM

## 2021-12-01 DIAGNOSIS — E78.2 MIXED HYPERLIPIDEMIA: ICD-10-CM

## 2021-12-01 PROCEDURE — 99214 OFFICE O/P EST MOD 30 MIN: CPT | Mod: S$GLB,,, | Performed by: NURSE PRACTITIONER

## 2021-12-01 PROCEDURE — 4010F ACE/ARB THERAPY RXD/TAKEN: CPT | Mod: S$GLB,,, | Performed by: NURSE PRACTITIONER

## 2021-12-01 PROCEDURE — 4010F PR ACE/ARB THEARPY RXD/TAKEN: ICD-10-PCS | Mod: S$GLB,,, | Performed by: NURSE PRACTITIONER

## 2021-12-01 PROCEDURE — 99214 PR OFFICE/OUTPT VISIT, EST, LEVL IV, 30-39 MIN: ICD-10-PCS | Mod: S$GLB,,, | Performed by: NURSE PRACTITIONER

## 2021-12-01 RX ORDER — TRAZODONE HYDROCHLORIDE 50 MG/1
50 TABLET ORAL NIGHTLY PRN
Qty: 90 TABLET | Refills: 0 | Status: SHIPPED | OUTPATIENT
Start: 2021-12-01 | End: 2022-02-09 | Stop reason: SDUPTHER

## 2021-12-15 ENCOUNTER — CLINICAL SUPPORT (OUTPATIENT)
Dept: SMOKING CESSATION | Facility: CLINIC | Age: 36
End: 2021-12-15

## 2021-12-15 DIAGNOSIS — F17.200 NICOTINE DEPENDENCE: Primary | ICD-10-CM

## 2021-12-15 PROCEDURE — 99404 PR PREVENT COUNSEL,INDIV,60 MIN: ICD-10-PCS | Mod: S$GLB,,,

## 2021-12-15 PROCEDURE — 99404 PREV MED CNSL INDIV APPRX 60: CPT | Mod: S$GLB,,,

## 2021-12-15 RX ORDER — BUPROPION HYDROCHLORIDE 150 MG/1
TABLET, EXTENDED RELEASE ORAL
Qty: 60 TABLET | Refills: 0 | Status: SHIPPED | OUTPATIENT
Start: 2021-12-15 | End: 2022-02-02 | Stop reason: SDUPTHER

## 2021-12-15 RX ORDER — IBUPROFEN 200 MG
1 TABLET ORAL DAILY
Qty: 28 PATCH | Refills: 0 | Status: SHIPPED | OUTPATIENT
Start: 2021-12-15 | End: 2022-03-07

## 2021-12-20 ENCOUNTER — CLINICAL SUPPORT (OUTPATIENT)
Dept: SMOKING CESSATION | Facility: CLINIC | Age: 36
End: 2021-12-20
Payer: COMMERCIAL

## 2021-12-20 DIAGNOSIS — F17.200 NICOTINE DEPENDENCE: Primary | ICD-10-CM

## 2021-12-20 PROCEDURE — 99999 PR PBB SHADOW E&M-EST. PATIENT-LVL II: ICD-10-PCS | Mod: PBBFAC,,,

## 2021-12-20 PROCEDURE — 99999 PR PBB SHADOW E&M-EST. PATIENT-LVL II: CPT | Mod: PBBFAC,,,

## 2021-12-20 PROCEDURE — 99404 PR PREVENT COUNSEL,INDIV,60 MIN: ICD-10-PCS | Mod: S$PBB,,,

## 2021-12-20 PROCEDURE — 99404 PREV MED CNSL INDIV APPRX 60: CPT | Mod: S$PBB,,,

## 2022-01-10 ENCOUNTER — CLINICAL SUPPORT (OUTPATIENT)
Dept: SMOKING CESSATION | Facility: CLINIC | Age: 37
End: 2022-01-10

## 2022-01-10 DIAGNOSIS — F17.200 NICOTINE DEPENDENCE: Primary | ICD-10-CM

## 2022-01-10 PROCEDURE — 99401 PREV MED CNSL INDIV APPRX 15: CPT | Mod: S$GLB,,,

## 2022-01-10 PROCEDURE — 99999 PR PBB SHADOW E&M-EST. PATIENT-LVL I: ICD-10-PCS | Mod: PBBFAC,,,

## 2022-01-10 PROCEDURE — 99999 PR PBB SHADOW E&M-EST. PATIENT-LVL I: CPT | Mod: PBBFAC,,,

## 2022-01-10 PROCEDURE — 99401 PR PREVENT COUNSEL,INDIV,15 MIN: ICD-10-PCS | Mod: S$GLB,,,

## 2022-01-10 NOTE — Clinical Note
I spoke to patient via telephone, as he cancelled Group session today. He is smoking about 25 cigarettes per day and I commended him on some progress. He reports that he is now taking the We;;butin 150 mg SR BID without any negative side effects at this time. He has decided not to start the nicotine at this time, he will wait and to treatment plan if needed to further his aid his quit. He remains motivated and confident with his quit efforts. We discussed and reviewed: triggers, action plan, strategies of delay, distract, & move it. The patient will remain on the prescribed tobacco cessation medication regimen of 150 mg SR  BID without any negative side effects at this time. The patient will continue to come to the clinic for additional support and encouragement.

## 2022-01-10 NOTE — PROGRESS NOTES
Individual Follow-Up Form    1/10/2022    Quit Date:     Clinical Status of Patient: Outpatient via telephone     Length of Service: 15 minutes     Continuing Medication: yes  Wellbutrin    Other Medications:      Target Symptoms: Withdrawal and medication side effects. The following were  rated moderate (3) to severe (4) on TCRS:  · Moderate (3): Desire or Crave- discussed withdrawal & habit   · Severe (4): None     Comments: I spoke to patient via telephone, as he cancelled Group session today. He is smoking about 25 cigarettes per day and I commended him on some progress. He reports that he is now taking the We;;butin 150 mg SR BID without any negative side effects at this time. He has decided not to start the nicotine at this time, he will wait and to treatment plan if needed to further his aid his quit. He remains motivated and confident with his quit efforts. We discussed and reviewed: triggers, action plan, strategies of delay, distract, & move it. The patient will remain on the prescribed tobacco cessation medication regimen of 150 mg SR  BID without any negative side effects at this time. The patient will continue to come to the clinic for additional support and encouragement.     Diagnosis: F17.200    Next Visit: 2 weeks

## 2022-02-02 ENCOUNTER — TELEPHONE (OUTPATIENT)
Dept: SMOKING CESSATION | Facility: CLINIC | Age: 37
End: 2022-02-02
Payer: COMMERCIAL

## 2022-02-02 DIAGNOSIS — F17.200 NICOTINE DEPENDENCE: ICD-10-CM

## 2022-02-02 RX ORDER — BUPROPION HYDROCHLORIDE 150 MG/1
TABLET, EXTENDED RELEASE ORAL
Qty: 60 TABLET | Refills: 0 | Status: SHIPPED | OUTPATIENT
Start: 2022-02-02 | End: 2022-03-07 | Stop reason: SDUPTHER

## 2022-02-09 ENCOUNTER — CLINICAL SUPPORT (OUTPATIENT)
Dept: SMOKING CESSATION | Facility: CLINIC | Age: 37
End: 2022-02-09

## 2022-02-09 DIAGNOSIS — F17.200 NICOTINE DEPENDENCE: Primary | ICD-10-CM

## 2022-02-09 PROCEDURE — 99402 PR PREVENT COUNSEL,INDIV,30 MIN: ICD-10-PCS | Mod: S$GLB,,,

## 2022-02-09 PROCEDURE — 99402 PREV MED CNSL INDIV APPRX 30: CPT | Mod: S$GLB,,,

## 2022-02-09 RX ORDER — DM/P-EPHED/ACETAMINOPH/DOXYLAM 30-7.5/3
2 LIQUID (ML) ORAL
Qty: 288 LOZENGE | Refills: 0 | Status: SHIPPED | OUTPATIENT
Start: 2022-02-09 | End: 2022-03-10

## 2022-02-09 NOTE — PROGRESS NOTES
Individual Follow-Up Form    2/9/2022    Quit Date:     Clinical Status of Patient: Outpatient via telephone     Length of Service: 30 minutes    Continuing Medication: yes  Wellbutrin    Other Medications:      Target Symptoms: Withdrawal and medication side effects. The following were rated moderate (3) to severe (4) on TCRS:  · Moderate (3): Desire or Crave - discussed habit & withdrawal   · Severe (4): None      Comments: I spoke to patient by phone for tobacco cessation follow up. He states that he's made some progress and he is smoking less at home and less at work. He feels he has more triggers at work so he smokes up to 20 cigarettes, while at home he smokes about 8-10 cigarettes. We discussed and reviewed: triggers, strategies of delay, distract, move it, and change of routine, interventions for overcoming boredom when at work, focused goal for the week, and treatment plan options. The patient remains on the prescribed tobacco cessation medication regimen of 150 mg SR Wellbutrin BID without any negative side effects at this time. He will add a 2 mg nicotine lozenge as needed to further aid his quit. 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     Diagnosis: F17.200    Next Visit: 2 weeks

## 2022-02-09 NOTE — Clinical Note
I spoke to patient by phone for tobacco cessation follow up. He states that he's made some progress and he is smoking less at home and less at work. He feels he has more triggers at work so he smokes up to 20 cigarettes, while at home he smokes about 8-10 cigarettes. We discussed and reviewed: triggers, strategies of delay, distract, move it, and change of routine, interventions for overcoming boredom when at work, focused goal for the week, and treatment plan options. The patient remains on the prescribed tobacco cessation medication regimen of 150 mg SR Wellbutrin BID without any negative side effects at this time. He will add a 2 mg nicotine lozenge as needed to further aid his quit. 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

## 2022-03-07 ENCOUNTER — OFFICE VISIT (OUTPATIENT)
Dept: FAMILY MEDICINE | Facility: CLINIC | Age: 37
End: 2022-03-07
Payer: COMMERCIAL

## 2022-03-07 VITALS
HEART RATE: 72 BPM | WEIGHT: 193 LBS | SYSTOLIC BLOOD PRESSURE: 124 MMHG | OXYGEN SATURATION: 97 % | BODY MASS INDEX: 27.02 KG/M2 | HEIGHT: 71 IN | RESPIRATION RATE: 18 BRPM | DIASTOLIC BLOOD PRESSURE: 78 MMHG

## 2022-03-07 DIAGNOSIS — I10 ESSENTIAL HYPERTENSION: ICD-10-CM

## 2022-03-07 DIAGNOSIS — I49.9 CARDIAC ARRHYTHMIA, UNSPECIFIED CARDIAC ARRHYTHMIA TYPE: ICD-10-CM

## 2022-03-07 DIAGNOSIS — E78.2 MIXED HYPERLIPIDEMIA: ICD-10-CM

## 2022-03-07 DIAGNOSIS — F51.01 PRIMARY INSOMNIA: ICD-10-CM

## 2022-03-07 DIAGNOSIS — F17.210 CIGARETTE NICOTINE DEPENDENCE WITHOUT COMPLICATION: ICD-10-CM

## 2022-03-07 PROCEDURE — 1160F PR REVIEW ALL MEDS BY PRESCRIBER/CLIN PHARMACIST DOCUMENTED: ICD-10-PCS | Mod: S$GLB,,, | Performed by: NURSE PRACTITIONER

## 2022-03-07 PROCEDURE — 3008F PR BODY MASS INDEX (BMI) DOCUMENTED: ICD-10-PCS | Mod: S$GLB,,, | Performed by: NURSE PRACTITIONER

## 2022-03-07 PROCEDURE — 99213 OFFICE O/P EST LOW 20 MIN: CPT | Mod: S$GLB,,, | Performed by: NURSE PRACTITIONER

## 2022-03-07 PROCEDURE — 3008F BODY MASS INDEX DOCD: CPT | Mod: S$GLB,,, | Performed by: NURSE PRACTITIONER

## 2022-03-07 PROCEDURE — 3074F SYST BP LT 130 MM HG: CPT | Mod: S$GLB,,, | Performed by: NURSE PRACTITIONER

## 2022-03-07 PROCEDURE — 99213 PR OFFICE/OUTPT VISIT, EST, LEVL III, 20-29 MIN: ICD-10-PCS | Mod: S$GLB,,, | Performed by: NURSE PRACTITIONER

## 2022-03-07 PROCEDURE — 3078F DIAST BP <80 MM HG: CPT | Mod: S$GLB,,, | Performed by: NURSE PRACTITIONER

## 2022-03-07 PROCEDURE — 1160F RVW MEDS BY RX/DR IN RCRD: CPT | Mod: S$GLB,,, | Performed by: NURSE PRACTITIONER

## 2022-03-07 PROCEDURE — 3078F PR MOST RECENT DIASTOLIC BLOOD PRESSURE < 80 MM HG: ICD-10-PCS | Mod: S$GLB,,, | Performed by: NURSE PRACTITIONER

## 2022-03-07 PROCEDURE — 3074F PR MOST RECENT SYSTOLIC BLOOD PRESSURE < 130 MM HG: ICD-10-PCS | Mod: S$GLB,,, | Performed by: NURSE PRACTITIONER

## 2022-03-07 PROCEDURE — 4010F ACE/ARB THERAPY RXD/TAKEN: CPT | Mod: S$GLB,,, | Performed by: NURSE PRACTITIONER

## 2022-03-07 PROCEDURE — 4010F PR ACE/ARB THEARPY RXD/TAKEN: ICD-10-PCS | Mod: S$GLB,,, | Performed by: NURSE PRACTITIONER

## 2022-03-07 RX ORDER — AMLODIPINE AND OLMESARTAN MEDOXOMIL 5; 40 MG/1; MG/1
1 TABLET ORAL DAILY
Qty: 90 TABLET | Refills: 1 | Status: SHIPPED | OUTPATIENT
Start: 2022-03-07 | End: 2022-05-04 | Stop reason: SDUPTHER

## 2022-03-07 RX ORDER — BUPROPION HYDROCHLORIDE 150 MG/1
150 TABLET, EXTENDED RELEASE ORAL 2 TIMES DAILY
Qty: 180 TABLET | Refills: 1 | Status: SHIPPED | OUTPATIENT
Start: 2022-03-07 | End: 2022-03-10

## 2022-03-07 RX ORDER — ROSUVASTATIN CALCIUM 10 MG/1
10 TABLET, COATED ORAL NIGHTLY
Qty: 90 TABLET | Refills: 1 | Status: SHIPPED | OUTPATIENT
Start: 2022-03-07 | End: 2022-05-04 | Stop reason: SDUPTHER

## 2022-03-07 RX ORDER — TRAZODONE HYDROCHLORIDE 100 MG/1
100 TABLET ORAL NIGHTLY PRN
Qty: 90 TABLET | Refills: 1 | Status: SHIPPED | OUTPATIENT
Start: 2022-03-07 | End: 2022-08-08 | Stop reason: SDUPTHER

## 2022-03-07 RX ORDER — METOPROLOL SUCCINATE 25 MG/1
25 TABLET, EXTENDED RELEASE ORAL NIGHTLY
Qty: 90 TABLET | Refills: 1 | Status: SHIPPED | OUTPATIENT
Start: 2022-03-07 | End: 2022-09-07 | Stop reason: SDUPTHER

## 2022-03-07 NOTE — PROGRESS NOTES
SUBJECTIVE:      Patient ID: Irving Tanner is a 37 y.o. male.    Chief Complaint: Hypertension    Pt presents for F/U HTN and PVCs (trigeminy). He continues on metoprolol and amlodipine-olmesartan daily. His BP is well controlled today. He reports he is tolerating the medication without side effects. He has stopped the energy drinks and cut back on caffeine reportedly. He continues on rosuvastatin for his cholesterol and still hasn't completed labs as ordered to recheck levels. He is unable to complete his echo or Holter monitor as recommended due to financial cost after insurance coverage. He has been compliant with his medications. Last visit, he was referred to the smoking cessation clinic and he continues to work with them. He is on BID Wellbutrin and tolerating well. He reports he doesn't smoke at home but does still smoke often at work due to boredom, reportedly. Denies CP, SOB, wheezing, fevers, nausea, vomiting, diarrhea, constipation, numbness, weakness, dizziness, palpitations, or any other concerns at this time.    Hypertension  This is a chronic problem. The current episode started more than 1 month ago. The problem has been gradually improving since onset. The problem is controlled. Pertinent negatives include no anxiety, blurred vision, chest pain, headaches, malaise/fatigue, neck pain, orthopnea, palpitations, peripheral edema, PND, shortness of breath or sweats. There are no associated agents to hypertension. Risk factors for coronary artery disease include dyslipidemia, male gender and smoking/tobacco exposure. Past treatments include calcium channel blockers, angiotensin blockers and beta blockers. The current treatment provides moderate improvement. There are no compliance problems.        Past Surgical History:   Procedure Laterality Date    APPENDECTOMY      FOOT FRACTURE SURGERY      Incision and Drainage of Perirectal Abscess  06/22/2018         Family History   Problem  Relation Age of Onset    Hypertension Mother     Diabetes Mother     Thyroid disease Mother     No Known Problems Father       Social History     Socioeconomic History    Marital status:     Number of children: 1   Tobacco Use    Smoking status: Current Every Day Smoker     Packs/day: 1.00     Years: 18.00     Pack years: 18.00     Types: Cigarettes    Smokeless tobacco: Never Used   Substance and Sexual Activity    Alcohol use: No     Comment: occasionally    Drug use: No    Sexual activity: Yes     Partners: Female     Birth control/protection: None     Current Outpatient Medications   Medication Sig Dispense Refill    nicotine polacrilex 2 MG Lozg Take 1 lozenge (2 mg total) by mouth as needed (May take up to 8-10 per day as needed Q 1-3.). 288 lozenge 0    amlodipine-olmesartan (JENY) 5-40 mg per tablet Take 1 tablet by mouth once daily. 90 tablet 1    buPROPion (WELLBUTRIN SR) 150 MG TBSR 12 hr tablet Take 1 tablet (150 mg total) by mouth 2 (two) times daily. 180 tablet 1    metoprolol succinate (TOPROL-XL) 25 MG 24 hr tablet Take 1 tablet (25 mg total) by mouth every evening. 90 tablet 1    rosuvastatin (CRESTOR) 10 MG tablet Take 1 tablet (10 mg total) by mouth every evening. 90 tablet 1    traZODone (DESYREL) 100 MG tablet Take 1 tablet (100 mg total) by mouth nightly as needed for Insomnia. 90 tablet 1     No current facility-administered medications for this visit.     Review of patient's allergies indicates:   Allergen Reactions    Motrin [ibuprofen]      swelling      Past Medical History:   Diagnosis Date    Hyperlipidemia     Hypertension      Past Surgical History:   Procedure Laterality Date    APPENDECTOMY      FOOT FRACTURE SURGERY      Incision and Drainage of Perirectal Abscess  06/22/2018           Review of Systems   Constitutional: Negative for activity change, appetite change, chills, fatigue, fever, malaise/fatigue and unexpected weight change.  "  HENT: Negative for congestion, ear pain, hearing loss, mouth sores, nosebleeds, postnasal drip, rhinorrhea, sinus pressure, sinus pain, sneezing, sore throat and trouble swallowing.    Eyes: Negative for blurred vision, pain, discharge and visual disturbance.   Respiratory: Negative for apnea, cough, chest tightness, shortness of breath, wheezing and stridor.    Cardiovascular: Negative for chest pain, palpitations, orthopnea, leg swelling and PND.   Gastrointestinal: Negative for abdominal pain, blood in stool, constipation, diarrhea, nausea and vomiting.   Endocrine: Negative for polydipsia and polyuria.   Genitourinary: Negative for difficulty urinating, dysuria, flank pain, frequency, hematuria and urgency.   Musculoskeletal: Negative for arthralgias, joint swelling, myalgias, neck pain and neck stiffness.   Skin: Negative for color change, rash and wound.   Neurological: Negative for dizziness, tremors, seizures, syncope, weakness, light-headedness, numbness and headaches.   Hematological: Negative for adenopathy.   Psychiatric/Behavioral: Negative for confusion, dysphoric mood, sleep disturbance and suicidal ideas. The patient is not nervous/anxious.       OBJECTIVE:      Vitals:    03/07/22 1256 03/07/22 1307   BP: 130/80 124/78   BP Location: Left arm Left arm   Patient Position: Sitting Sitting   BP Method: Medium (Manual)    Pulse: 72    Resp: 18    SpO2: 97%    Weight: 87.5 kg (193 lb)    Height: 5' 11" (1.803 m)      Physical Exam  Vitals reviewed.   Constitutional:       General: He is not in acute distress.     Appearance: Normal appearance. He is well-developed and overweight. He is not diaphoretic.   HENT:      Head: Normocephalic and atraumatic.      Right Ear: Hearing and external ear normal.      Left Ear: Hearing and external ear normal.      Nose: Nose normal.      Mouth/Throat:      Lips: Pink.      Mouth: Mucous membranes are moist.   Eyes:      General: Lids are normal. No scleral icterus.  "       Right eye: No discharge.         Left eye: No discharge.      Extraocular Movements: Extraocular movements intact.      Conjunctiva/sclera: Conjunctivae normal.      Pupils: Pupils are equal, round, and reactive to light.   Neck:      Thyroid: No thyroid mass or thyromegaly.      Vascular: No carotid bruit.      Trachea: Trachea and phonation normal. No tracheal deviation.   Cardiovascular:      Rate and Rhythm: Normal rate and regular rhythm.      Pulses: Normal pulses.      Heart sounds: Normal heart sounds. No murmur heard.    No friction rub. No gallop.   Pulmonary:      Effort: Pulmonary effort is normal. No respiratory distress.      Breath sounds: Normal breath sounds. No stridor. No decreased breath sounds, wheezing, rhonchi or rales.   Chest:   Breasts:      Right: No supraclavicular adenopathy.      Left: No supraclavicular adenopathy.       Abdominal:      General: Bowel sounds are normal.      Palpations: Abdomen is soft.      Tenderness: There is no abdominal tenderness.   Musculoskeletal:         General: Normal range of motion.      Cervical back: Full passive range of motion without pain, normal range of motion and neck supple.      Right lower leg: No edema.      Left lower leg: No edema.   Lymphadenopathy:      Cervical: No cervical adenopathy.      Upper Body:      Right upper body: No supraclavicular adenopathy.      Left upper body: No supraclavicular adenopathy.   Skin:     General: Skin is warm and dry.      Capillary Refill: Capillary refill takes less than 2 seconds.      Findings: No rash.   Neurological:      General: No focal deficit present.      Mental Status: He is alert and oriented to person, place, and time.      Coordination: Coordination is intact.      Gait: Gait is intact.   Psychiatric:         Attention and Perception: Attention and perception normal.         Mood and Affect: Mood and affect normal.         Speech: Speech normal.         Behavior: Behavior normal.  Behavior is cooperative.         Thought Content: Thought content normal. Thought content does not include suicidal plan.         Cognition and Memory: Cognition and memory normal.         Judgment: Judgment normal.        Assessment:       1. Essential hypertension    2. Cardiac arrhythmia, unspecified cardiac arrhythmia type    3. Mixed hyperlipidemia    4. Primary insomnia    5. Nicotine dependence        Plan:       Essential hypertension  Diagnosis is stable on current regimen. Continue current medications. Refills given as previously prescribed.  -     amlodipine-olmesartan (JENY) 5-40 mg per tablet; Take 1 tablet by mouth once daily.  Dispense: 90 tablet; Refill: 1    Cardiac arrhythmia, unspecified cardiac arrhythmia type  Controlled at this time and trigeminy not present on exam today. Regular rate and rhythm. Continue current medications. Can defer cardiac workup for now.  -     metoprolol succinate (TOPROL-XL) 25 MG 24 hr tablet; Take 1 tablet (25 mg total) by mouth every evening.  Dispense: 90 tablet; Refill: 1    Mixed hyperlipidemia  Reminded to complete labs for treatment.  -     rosuvastatin (CRESTOR) 10 MG tablet; Take 1 tablet (10 mg total) by mouth every evening.  Dispense: 90 tablet; Refill: 1    Primary insomnia  Increasing trazodone to 100mg nightly for insomnia.  -     traZODone (DESYREL) 100 MG tablet; Take 1 tablet (100 mg total) by mouth nightly as needed for Insomnia.  Dispense: 90 tablet; Refill: 1    Nicotine dependence  Continue Wellbutrin and continue to work with smoking cessation clinic. Encouraged him to find a hobby while bored at work. Can also use sugarless gum.  -     buPROPion (WELLBUTRIN SR) 150 MG TBSR 12 hr tablet; Take 1 tablet (150 mg total) by mouth 2 (two) times daily.  Dispense: 180 tablet; Refill: 1        Follow up in about 6 months (around 9/7/2022) for Annual Well Check.      3/7/2022 Meghna Mcneil, AGA, FNP

## 2022-03-09 ENCOUNTER — TELEPHONE (OUTPATIENT)
Dept: SMOKING CESSATION | Facility: CLINIC | Age: 37
End: 2022-03-09
Payer: COMMERCIAL

## 2022-03-09 NOTE — TELEPHONE ENCOUNTER
I called patient today for tobacco cessation follow up, but I had to leave message. I did leave my name and contact number ([phone]) for a return call.

## 2022-03-10 ENCOUNTER — CLINICAL SUPPORT (OUTPATIENT)
Dept: SMOKING CESSATION | Facility: CLINIC | Age: 37
End: 2022-03-10

## 2022-03-10 DIAGNOSIS — F17.200 NICOTINE DEPENDENCE: Primary | ICD-10-CM

## 2022-03-10 PROCEDURE — 99403 PREV MED CNSL INDIV APPRX 45: CPT | Mod: S$GLB,,,

## 2022-03-10 PROCEDURE — 99999 PR PBB SHADOW E&M-EST. PATIENT-LVL I: ICD-10-PCS | Mod: PBBFAC,,,

## 2022-03-10 PROCEDURE — 99999 PR PBB SHADOW E&M-EST. PATIENT-LVL I: CPT | Mod: PBBFAC,,,

## 2022-03-10 PROCEDURE — 99403 PR PREVENT COUNSEL,INDIV,45 MIN: ICD-10-PCS | Mod: S$GLB,,,

## 2022-03-10 NOTE — Clinical Note
Patient was seen today in clinic for tobacco cessation follow up. He is currently smoking up to 30 cigarettes when at work and up to 20 on his off days at home. He has used Wellbutrin 150 mg SR BID as prescribed and 2 mg nicotine lozenge as needed, without any negative side effects thus far. However, he did find this treatment plan to be non-effective for him. We discussed and reviewed: triggers, strategies and daily action plan, alternate treatment options of Varenicline, weekly goals, support system, motivation and desire to quit, setting a future quit date and continued follow up. He has weaned off the Wellbutrin as directed without difficulty and he will discontinue nicotine lozenge. He will begin taking Varenicline as directed and he will follow up in 2 weeks.

## 2022-03-10 NOTE — PROGRESS NOTES
Individual Follow-Up Form    3/10/2022    Quit Date:     Clinical Status of Patient: Outpatient    Length of Service: 45 minutes    Continuing Medication: yes  Chantix    Other Medications:      Target Symptoms: Withdrawal and medication side effects. The following were rated moderate (3) to severe (4) on TCRS:  · Moderate (3): frustrated - discussed withdrawal & habit  · Severe (4): desire or crave- discussed withdrawal &b habit     Comments: Patient was seen today in clinic for tobacco cessation follow up. He is currently smoking up to 30 cigarettes when at work and up to 20 on his off days at home. He has used Wellbutrin 150 mg SR BID as prescribed and 2 mg nicotine lozenge as needed, without any negative side effects thus far. However, he did find this treatment plan to be non-effective for him. We discussed and reviewed: triggers, strategies and daily action plan, alternate treatment options of Varenicline, weekly goals, support system, motivation and desire to quit, setting a future quit date and continued follow up. He has weaned off the Wellbutrin as directed without difficulty and he will discontinue nicotine lozenge. He will begin taking Varenicline as directed and he will follow up in 2 weeks.     Diagnosis: F17.200    Next Visit: 2 weeks

## 2022-03-13 RX ORDER — VARENICLINE TARTRATE 1 MG/1
1 TABLET, FILM COATED ORAL 2 TIMES DAILY
Qty: 56 TABLET | Refills: 0 | Status: SHIPPED | OUTPATIENT
Start: 2022-03-13 | End: 2022-04-11

## 2022-03-16 ENCOUNTER — CLINICAL SUPPORT (OUTPATIENT)
Dept: SMOKING CESSATION | Facility: CLINIC | Age: 37
End: 2022-03-16
Payer: COMMERCIAL

## 2022-03-16 DIAGNOSIS — F17.200 NICOTINE DEPENDENCE: Primary | ICD-10-CM

## 2022-03-16 PROCEDURE — 99407 BEHAV CHNG SMOKING > 10 MIN: CPT | Mod: S$GLB,,,

## 2022-03-16 PROCEDURE — 99407 PR TOBACCO USE CESSATION INTENSIVE >10 MINUTES: ICD-10-PCS | Mod: S$GLB,,,

## 2022-03-16 NOTE — PROGRESS NOTES
Spoke with patient today in regard to smoking cessation progress for 3 month telephone follow up, he states not tobacco free. Patient is currently in the program and states the start of Chantix 2 days ago to help aid in his quit attempt. Informed patient of benefit period, future follow ups, and contact information if any further help or support is needed. Will complete smart form for 3 month follow up on Quit attempt #1.

## 2022-07-06 ENCOUNTER — TELEPHONE (OUTPATIENT)
Dept: SMOKING CESSATION | Facility: CLINIC | Age: 37
End: 2022-07-06
Payer: COMMERCIAL

## 2022-09-07 ENCOUNTER — OFFICE VISIT (OUTPATIENT)
Dept: FAMILY MEDICINE | Facility: CLINIC | Age: 37
End: 2022-09-07
Payer: COMMERCIAL

## 2022-09-07 VITALS
OXYGEN SATURATION: 96 % | RESPIRATION RATE: 16 BRPM | SYSTOLIC BLOOD PRESSURE: 128 MMHG | DIASTOLIC BLOOD PRESSURE: 78 MMHG | WEIGHT: 199.19 LBS | HEIGHT: 71 IN | BODY MASS INDEX: 27.89 KG/M2 | TEMPERATURE: 98 F | HEART RATE: 87 BPM

## 2022-09-07 DIAGNOSIS — R71.8 ELEVATED HEMATOCRIT: ICD-10-CM

## 2022-09-07 DIAGNOSIS — E78.2 MIXED HYPERLIPIDEMIA: ICD-10-CM

## 2022-09-07 DIAGNOSIS — F17.210 CIGARETTE NICOTINE DEPENDENCE WITHOUT COMPLICATION: ICD-10-CM

## 2022-09-07 DIAGNOSIS — I10 ESSENTIAL HYPERTENSION: ICD-10-CM

## 2022-09-07 DIAGNOSIS — Z00.01 ANNUAL VISIT FOR GENERAL ADULT MEDICAL EXAMINATION WITH ABNORMAL FINDINGS: Primary | ICD-10-CM

## 2022-09-07 DIAGNOSIS — F51.01 PRIMARY INSOMNIA: ICD-10-CM

## 2022-09-07 DIAGNOSIS — Z23 NEED FOR TDAP VACCINATION: ICD-10-CM

## 2022-09-07 DIAGNOSIS — I49.8 VENTRICULAR TRIGEMINY: ICD-10-CM

## 2022-09-07 PROCEDURE — 1160F PR REVIEW ALL MEDS BY PRESCRIBER/CLIN PHARMACIST DOCUMENTED: ICD-10-PCS | Mod: CPTII,S$GLB,, | Performed by: NURSE PRACTITIONER

## 2022-09-07 PROCEDURE — 3074F PR MOST RECENT SYSTOLIC BLOOD PRESSURE < 130 MM HG: ICD-10-PCS | Mod: CPTII,S$GLB,, | Performed by: NURSE PRACTITIONER

## 2022-09-07 PROCEDURE — 90471 IMMUNIZATION ADMIN: CPT | Mod: S$GLB,,, | Performed by: NURSE PRACTITIONER

## 2022-09-07 PROCEDURE — 90715 TDAP VACCINE 7 YRS/> IM: CPT | Mod: S$GLB,,, | Performed by: NURSE PRACTITIONER

## 2022-09-07 PROCEDURE — 1159F MED LIST DOCD IN RCRD: CPT | Mod: CPTII,S$GLB,, | Performed by: NURSE PRACTITIONER

## 2022-09-07 PROCEDURE — 3078F DIAST BP <80 MM HG: CPT | Mod: CPTII,S$GLB,, | Performed by: NURSE PRACTITIONER

## 2022-09-07 PROCEDURE — 90471 TDAP VACCINE GREATER THAN OR EQUAL TO 7YO IM: ICD-10-PCS | Mod: S$GLB,,, | Performed by: NURSE PRACTITIONER

## 2022-09-07 PROCEDURE — 3074F SYST BP LT 130 MM HG: CPT | Mod: CPTII,S$GLB,, | Performed by: NURSE PRACTITIONER

## 2022-09-07 PROCEDURE — 1160F RVW MEDS BY RX/DR IN RCRD: CPT | Mod: CPTII,S$GLB,, | Performed by: NURSE PRACTITIONER

## 2022-09-07 PROCEDURE — 4010F PR ACE/ARB THEARPY RXD/TAKEN: ICD-10-PCS | Mod: CPTII,S$GLB,, | Performed by: NURSE PRACTITIONER

## 2022-09-07 PROCEDURE — 99395 PR PREVENTIVE VISIT,EST,18-39: ICD-10-PCS | Mod: 25,S$GLB,, | Performed by: NURSE PRACTITIONER

## 2022-09-07 PROCEDURE — 3008F PR BODY MASS INDEX (BMI) DOCUMENTED: ICD-10-PCS | Mod: CPTII,S$GLB,, | Performed by: NURSE PRACTITIONER

## 2022-09-07 PROCEDURE — 1159F PR MEDICATION LIST DOCUMENTED IN MEDICAL RECORD: ICD-10-PCS | Mod: CPTII,S$GLB,, | Performed by: NURSE PRACTITIONER

## 2022-09-07 PROCEDURE — 4010F ACE/ARB THERAPY RXD/TAKEN: CPT | Mod: CPTII,S$GLB,, | Performed by: NURSE PRACTITIONER

## 2022-09-07 PROCEDURE — 3078F PR MOST RECENT DIASTOLIC BLOOD PRESSURE < 80 MM HG: ICD-10-PCS | Mod: CPTII,S$GLB,, | Performed by: NURSE PRACTITIONER

## 2022-09-07 PROCEDURE — 3008F BODY MASS INDEX DOCD: CPT | Mod: CPTII,S$GLB,, | Performed by: NURSE PRACTITIONER

## 2022-09-07 PROCEDURE — 90715 TDAP VACCINE GREATER THAN OR EQUAL TO 7YO IM: ICD-10-PCS | Mod: S$GLB,,, | Performed by: NURSE PRACTITIONER

## 2022-09-07 PROCEDURE — 99395 PREV VISIT EST AGE 18-39: CPT | Mod: 25,S$GLB,, | Performed by: NURSE PRACTITIONER

## 2022-09-07 RX ORDER — AMLODIPINE AND OLMESARTAN MEDOXOMIL 5; 40 MG/1; MG/1
1 TABLET ORAL DAILY
Qty: 90 TABLET | Refills: 1 | Status: ON HOLD | OUTPATIENT
Start: 2022-09-07 | End: 2022-10-10 | Stop reason: HOSPADM

## 2022-09-07 RX ORDER — METOPROLOL SUCCINATE 50 MG/1
50 TABLET, EXTENDED RELEASE ORAL NIGHTLY
Qty: 90 TABLET | Refills: 1 | Status: ON HOLD | OUTPATIENT
Start: 2022-09-07 | End: 2022-10-10 | Stop reason: HOSPADM

## 2022-09-07 RX ORDER — BUPROPION HYDROCHLORIDE 150 MG/1
150 TABLET, EXTENDED RELEASE ORAL 2 TIMES DAILY
Qty: 180 TABLET | Refills: 2 | Status: SHIPPED | OUTPATIENT
Start: 2022-09-07 | End: 2023-04-18

## 2022-09-07 RX ORDER — AMITRIPTYLINE HYDROCHLORIDE 25 MG/1
25 TABLET, FILM COATED ORAL NIGHTLY PRN
Qty: 90 TABLET | Refills: 1 | Status: SHIPPED | OUTPATIENT
Start: 2022-09-07 | End: 2023-01-30

## 2022-09-07 RX ORDER — ROSUVASTATIN CALCIUM 10 MG/1
10 TABLET, COATED ORAL NIGHTLY
Qty: 90 TABLET | Refills: 1 | Status: ON HOLD | OUTPATIENT
Start: 2022-09-07 | End: 2022-10-10 | Stop reason: SDUPTHER

## 2022-09-07 NOTE — PROGRESS NOTES
SUBJECTIVE:      Patient ID: Irving Tanner is a 37 y.o. male.    Chief Complaint: Annual Exam    Pt presents for annual visit. This is a 36 yo M with mHx of HTN, HLD, ventricular trigeminy, insomnia, and he is a cigarette smoker. He does not watch his diet but reports plenty of exercise at his work on a daily basis. He continues on metoprolol and amlodipine-olmesartan daily. His BP is well controlled today. He reports he is tolerating the medication without side effects. His ventricular trigeminy unfortunately has returned and he reports he does continue to drink Bang drinks intermittently. He feels he needs the caffeine in order to continue to work as he does. Denies excessive ETOH use or any drug use. He gets drug tested frequently for his job. Denies CP, SOB, dizziness, or presyncope. He continues on rosuvastatin for his cholesterol. He continues to be unable to complete his echo or Holter monitor as recommended due to financial cost after insurance coverage. He has been compliant with his medications. He has been working with the smoking cessation clinic but finds difficulty getting to the clinic and is requesting the Wellbutrin be managed here. He was on BID Wellbutrin and tolerating well after reportedly previously failing Chantix. He reports a medication is causing him sexual dysfunction which is intolerable to him. He would like to receive the Tdap today. He is overdue for labs, never having completed them previously. He is overdue for his dental and eye visits. Denies CP, SOB, wheezing, fevers, nausea, vomiting, diarrhea, constipation, numbness, weakness, dizziness, palpitations, or any other concerns at this time.    Heart Problem  This is a recurrent problem. The current episode started more than 1 month ago. The problem occurs intermittently. The problem has been waxing and waning. Pertinent negatives include no abdominal pain, anorexia, arthralgias, change in bowel habit, chest pain, chills,  congestion, coughing, diaphoresis, fatigue, fever, headaches, joint swelling, myalgias, nausea, neck pain, numbness, rash, sore throat, swollen glands, urinary symptoms, vertigo, visual change, vomiting or weakness. Nothing aggravates the symptoms. Treatments tried: beta blockers, decreased caffeine intake. Improvement on treatment: resolved initially but recurrent.     Past Surgical History:   Procedure Laterality Date    APPENDECTOMY      FOOT FRACTURE SURGERY      Incision and Drainage of Perirectal Abscess  06/22/2018         Family History   Problem Relation Age of Onset    Hypertension Mother     Diabetes Mother     Thyroid disease Mother     No Known Problems Father       Social History     Socioeconomic History    Marital status:     Number of children: 1   Tobacco Use    Smoking status: Every Day     Packs/day: 1.00     Years: 18.00     Pack years: 18.00     Types: Cigarettes    Smokeless tobacco: Never   Substance and Sexual Activity    Alcohol use: No     Comment: occasionally    Drug use: No    Sexual activity: Yes     Partners: Female     Birth control/protection: None     Current Outpatient Medications   Medication Sig Dispense Refill    amitriptyline (ELAVIL) 25 MG tablet Take 1 tablet (25 mg total) by mouth nightly as needed for Insomnia. 90 tablet 1    amlodipine-olmesartan (JENY) 5-40 mg per tablet Take 1 tablet by mouth once daily. 90 tablet 1    buPROPion (WELLBUTRIN SR) 150 MG TBSR 12 hr tablet Take 1 tablet (150 mg total) by mouth 2 (two) times daily. 180 tablet 2    metoprolol succinate (TOPROL-XL) 50 MG 24 hr tablet Take 1 tablet (50 mg total) by mouth every evening. 90 tablet 1    rosuvastatin (CRESTOR) 10 MG tablet Take 1 tablet (10 mg total) by mouth every evening. 90 tablet 1     No current facility-administered medications for this visit.     Review of patient's allergies indicates:   Allergen Reactions    Motrin [ibuprofen]      swelling      Past Medical History:  "  Diagnosis Date    Hyperlipidemia     Hypertension      Past Surgical History:   Procedure Laterality Date    APPENDECTOMY      FOOT FRACTURE SURGERY      Incision and Drainage of Perirectal Abscess  06/22/2018           Review of Systems   Constitutional:  Negative for activity change, appetite change, chills, diaphoresis, fatigue, fever and unexpected weight change.   HENT:  Negative for congestion, ear pain, mouth sores, nosebleeds, postnasal drip, rhinorrhea, sinus pressure, sinus pain, sneezing, sore throat and trouble swallowing.    Eyes:  Negative for pain and visual disturbance.   Respiratory:  Negative for apnea, cough, chest tightness, shortness of breath, wheezing and stridor.    Cardiovascular:  Negative for chest pain, palpitations and leg swelling.   Gastrointestinal:  Negative for abdominal pain, anorexia, blood in stool, change in bowel habit, constipation, diarrhea, nausea and vomiting.   Genitourinary:  Negative for dysuria, flank pain, frequency and hematuria.   Musculoskeletal:  Negative for arthralgias, joint swelling, myalgias, neck pain and neck stiffness.   Skin:  Negative for color change, rash and wound.   Neurological:  Negative for dizziness, vertigo, tremors, seizures, syncope, weakness, light-headedness, numbness and headaches.   Hematological:  Negative for adenopathy.   Psychiatric/Behavioral:  Positive for sleep disturbance. Negative for confusion, dysphoric mood and suicidal ideas. The patient is not nervous/anxious.     OBJECTIVE:      Vitals:    09/07/22 1358 09/07/22 1432   BP: (!) 154/66 128/78   BP Location: Left arm Left arm   Patient Position: Sitting Sitting   BP Method: Medium (Manual)    Pulse: 87    Resp: 16    Temp: 98.3 °F (36.8 °C)    TempSrc: Oral    SpO2: 96%    Weight: 90.4 kg (199 lb 3.2 oz)    Height: 5' 11" (1.803 m)      Physical Exam  Vitals reviewed.   Constitutional:       General: He is not in acute distress.     Appearance: Normal appearance. He " is well-developed and overweight. He is not diaphoretic.   HENT:      Head: Normocephalic and atraumatic.      Right Ear: Hearing, tympanic membrane, ear canal and external ear normal.      Left Ear: Hearing, tympanic membrane, ear canal and external ear normal.      Nose: Nose normal. No mucosal edema, congestion or rhinorrhea.      Mouth/Throat:      Lips: Pink.      Mouth: Mucous membranes are moist.      Pharynx: Oropharynx is clear. Uvula midline. No pharyngeal swelling, oropharyngeal exudate or posterior oropharyngeal erythema.   Eyes:      General: Lids are normal. No scleral icterus.        Right eye: No discharge.         Left eye: No discharge.      Extraocular Movements: Extraocular movements intact.      Conjunctiva/sclera: Conjunctivae normal.      Pupils: Pupils are equal, round, and reactive to light.   Neck:      Thyroid: No thyroid mass or thyromegaly.      Vascular: No carotid bruit.      Trachea: Trachea and phonation normal. No tracheal deviation.   Cardiovascular:      Rate and Rhythm: Normal rate. Rhythm regularly irregular. FrequentExtrasystoles are present.     Pulses: Normal pulses.      Heart sounds: Normal heart sounds. No murmur heard.    No friction rub. No gallop.   Pulmonary:      Effort: Pulmonary effort is normal. No respiratory distress.      Breath sounds: Normal breath sounds. No stridor. No decreased breath sounds, wheezing, rhonchi or rales.   Abdominal:      General: Bowel sounds are normal.      Palpations: Abdomen is soft.      Tenderness: There is no abdominal tenderness.   Musculoskeletal:         General: Normal range of motion.      Cervical back: Full passive range of motion without pain, normal range of motion and neck supple.      Right lower leg: No edema.      Left lower leg: No edema.   Lymphadenopathy:      Cervical: No cervical adenopathy.      Upper Body:      Right upper body: No supraclavicular adenopathy.      Left upper body: No supraclavicular adenopathy.    Skin:     General: Skin is warm and dry.      Capillary Refill: Capillary refill takes less than 2 seconds.      Findings: No rash.   Neurological:      General: No focal deficit present.      Mental Status: He is alert and oriented to person, place, and time.      Coordination: Coordination is intact.      Gait: Gait is intact.   Psychiatric:         Attention and Perception: Attention and perception normal.         Mood and Affect: Mood and affect normal.         Speech: Speech normal.         Behavior: Behavior normal. Behavior is cooperative.         Thought Content: Thought content normal. Thought content does not include suicidal plan.         Cognition and Memory: Cognition and memory normal.         Judgment: Judgment normal.      Assessment:       1. Annual visit for general adult medical examination with abnormal findings    2. Essential hypertension    3. Ventricular trigeminy    4. Mixed hyperlipidemia    5. Primary insomnia    6. Cigarette nicotine dependence without complication    7. Elevated hematocrit    8. Need for Tdap vaccination        Plan:       Annual visit for general adult medical examination with abnormal findings  Instructed on guidelines recommending 150 minutes per week of brisk exercise and healthy lifestyle. Recommended well screenings, including immunizations, reviewed with patient. Discussed outstanding health maintenance and rationale for completion. Verbalized understanding.   -     CBC Auto Differential; Future; Expected date: 09/07/2022  -     Comprehensive Metabolic Panel; Future; Expected date: 09/07/2022    Essential hypertension  Diagnosis is stable on current regimen. Continue current medications. Refills given as previously prescribed.   -     Comprehensive Metabolic Panel; Future; Expected date: 09/07/2022  -     TSH; Future; Expected date: 09/07/2022  -     Urinalysis; Future; Expected date: 09/07/2022  -     amlodipine-olmesartan (JENY) 5-40 mg per tablet; Take 1  tablet by mouth once daily.  Dispense: 90 tablet; Refill: 1    Ventricular trigeminy  Since recurrent, should have referral to cardiology and instructed on importance of follow through with this referral. Also advised to get labs completed as ordered to ensure no metabolic etiology for this issue. Advised to continue to decrease caffeine intake. Increasing metoprolol to 50mg nightly.  -     Ambulatory referral/consult to Cardiology; Future; Expected date: 09/14/2022  -     TSH; Future; Expected date: 09/07/2022  -     BNP; Future; Expected date: 09/07/2022  -     metoprolol succinate (TOPROL-XL) 50 MG 24 hr tablet; Take 1 tablet (50 mg total) by mouth every evening.  Dispense: 90 tablet; Refill: 1    Mixed hyperlipidemia  -     Lipid Panel; Future; Expected date: 09/07/2022  -     rosuvastatin (CRESTOR) 10 MG tablet; Take 1 tablet (10 mg total) by mouth every evening.  Dispense: 90 tablet; Refill: 1    Primary insomnia  Trazodone likely causing sexual side effects. Instructed to wean from trazodone with 1/2 tablet nightly x 1 week then start amitriptyline for sleep aid. Attempting to avoid any controlled substances due to his occupation, possible side effects, and per pt request.  -     amitriptyline (ELAVIL) 25 MG tablet; Take 1 tablet (25 mg total) by mouth nightly as needed for Insomnia.  Dispense: 90 tablet; Refill: 1    Cigarette nicotine dependence without complication  -     buPROPion (WELLBUTRIN SR) 150 MG TBSR 12 hr tablet; Take 1 tablet (150 mg total) by mouth 2 (two) times daily.  Dispense: 180 tablet; Refill: 2    Elevated hematocrit  -     CBC Auto Differential; Future; Expected date: 09/07/2022  -     Iron and TIBC; Future; Expected date: 09/07/2022  -     Ferritin; Future; Expected date: 09/07/2022    Need for Tdap vaccination  -     (In Office Administered) Tdap Vaccine      Follow up in about 6 months (around 3/7/2023) for F/U HTN.      9/7/2022 Meghna Mcneil, AGA, FNP

## 2022-09-24 LAB
ALBUMIN SERPL-MCNC: 4.3 G/DL (ref 3.6–5.1)
ALBUMIN/GLOB SERPL: 1.5 (CALC) (ref 1–2.5)
ALP SERPL-CCNC: 47 U/L (ref 36–130)
ALT SERPL-CCNC: 20 U/L (ref 9–46)
APPEARANCE UR: CLEAR
AST SERPL-CCNC: 26 U/L (ref 10–40)
BASOPHILS # BLD AUTO: 86 CELLS/UL (ref 0–200)
BASOPHILS NFR BLD AUTO: 0.9 %
BILIRUB SERPL-MCNC: 0.5 MG/DL (ref 0.2–1.2)
BILIRUB UR QL STRIP: NEGATIVE
BNP SERPL-MCNC: 15 PG/ML
BUN SERPL-MCNC: 9 MG/DL (ref 7–25)
BUN/CREAT SERPL: NORMAL (CALC) (ref 6–22)
CALCIUM SERPL-MCNC: 9.4 MG/DL (ref 8.6–10.3)
CHLORIDE SERPL-SCNC: 104 MMOL/L (ref 98–110)
CHOLEST SERPL-MCNC: 163 MG/DL
CHOLEST/HDLC SERPL: 3.4 (CALC)
CO2 SERPL-SCNC: 29 MMOL/L (ref 20–32)
COLOR UR: NORMAL
CREAT SERPL-MCNC: 1 MG/DL (ref 0.6–1.26)
EGFR: 98 ML/MIN/1.73M2
EOSINOPHIL # BLD AUTO: 238 CELLS/UL (ref 15–500)
EOSINOPHIL NFR BLD AUTO: 2.5 %
ERYTHROCYTE [DISTWIDTH] IN BLOOD BY AUTOMATED COUNT: 15.3 % (ref 11–15)
FERRITIN SERPL-MCNC: 37 NG/ML (ref 38–380)
GLOBULIN SER CALC-MCNC: 2.8 G/DL (CALC) (ref 1.9–3.7)
GLUCOSE SERPL-MCNC: 84 MG/DL (ref 65–99)
GLUCOSE UR QL STRIP: NEGATIVE
HCT VFR BLD AUTO: 56 % (ref 38.5–50)
HDLC SERPL-MCNC: 48 MG/DL
HGB BLD-MCNC: 18.9 G/DL (ref 13.2–17.1)
HGB UR QL STRIP: NEGATIVE
IRON SATN MFR SERPL: 31 % (CALC) (ref 20–48)
IRON SERPL-MCNC: 115 MCG/DL (ref 50–180)
KETONES UR QL STRIP: NEGATIVE
LDLC SERPL CALC-MCNC: 85 MG/DL (CALC)
LEUKOCYTE ESTERASE UR QL STRIP: NEGATIVE
LYMPHOCYTES # BLD AUTO: 2337 CELLS/UL (ref 850–3900)
LYMPHOCYTES NFR BLD AUTO: 24.6 %
MCH RBC QN AUTO: 31.7 PG (ref 27–33)
MCHC RBC AUTO-ENTMCNC: 33.8 G/DL (ref 32–36)
MCV RBC AUTO: 94 FL (ref 80–100)
MONOCYTES # BLD AUTO: 874 CELLS/UL (ref 200–950)
MONOCYTES NFR BLD AUTO: 9.2 %
NEUTROPHILS # BLD AUTO: 5966 CELLS/UL (ref 1500–7800)
NEUTROPHILS NFR BLD AUTO: 62.8 %
NITRITE UR QL STRIP: NEGATIVE
NONHDLC SERPL-MCNC: 115 MG/DL (CALC)
PH UR STRIP: 6 [PH] (ref 5–8)
PLATELET # BLD AUTO: 273 THOUSAND/UL (ref 140–400)
PMV BLD REES-ECKER: 9.7 FL (ref 7.5–12.5)
POTASSIUM SERPL-SCNC: 4.5 MMOL/L (ref 3.5–5.3)
PROT SERPL-MCNC: 7.1 G/DL (ref 6.1–8.1)
PROT UR QL STRIP: NEGATIVE
RBC # BLD AUTO: 5.96 MILLION/UL (ref 4.2–5.8)
SODIUM SERPL-SCNC: 138 MMOL/L (ref 135–146)
SP GR UR STRIP: 1.03 (ref 1–1.03)
TIBC SERPL-MCNC: 369 MCG/DL (CALC) (ref 250–425)
TRIGL SERPL-MCNC: 208 MG/DL
TSH SERPL-ACNC: 1.24 MIU/L (ref 0.4–4.5)
WBC # BLD AUTO: 9.5 THOUSAND/UL (ref 3.8–10.8)

## 2022-09-28 ENCOUNTER — TELEPHONE (OUTPATIENT)
Dept: FAMILY MEDICINE | Facility: CLINIC | Age: 37
End: 2022-09-28

## 2022-09-28 DIAGNOSIS — R71.8 ELEVATED HEMATOCRIT: Primary | ICD-10-CM

## 2022-09-28 DIAGNOSIS — D58.2 ELEVATED HEMOGLOBIN: ICD-10-CM

## 2022-09-28 NOTE — PROGRESS NOTES
Please call patient. Labs continue to show elevated H and H (red blood cell count). Would recommend hematology referral due to its continued elevation for further evaluation. Will send referral. Please give him the info. Did he follow up with cardiology yet?

## 2022-09-28 NOTE — TELEPHONE ENCOUNTER
----- Message from FAIZAN Meyer sent at 9/28/2022  2:50 PM CDT -----  Please call patient. Labs continue to show elevated H and H (red blood cell count). Would recommend hematology referral due to its continued elevation for further evaluation. Will send referral. Please give him the info. Did he follow up with cardiology yet?

## 2022-10-07 ENCOUNTER — HOSPITAL ENCOUNTER (INPATIENT)
Facility: HOSPITAL | Age: 37
LOS: 2 days | Discharge: HOME | DRG: 247 | End: 2022-10-10
Attending: EMERGENCY MEDICINE | Admitting: FAMILY MEDICINE
Payer: COMMERCIAL

## 2022-10-07 DIAGNOSIS — R07.89 OTHER CHEST PAIN: ICD-10-CM

## 2022-10-07 DIAGNOSIS — E78.2 MIXED HYPERLIPIDEMIA: ICD-10-CM

## 2022-10-07 DIAGNOSIS — I48.0 PAROXYSMAL ATRIAL FIBRILLATION: ICD-10-CM

## 2022-10-07 DIAGNOSIS — R07.9 CHEST PAIN: ICD-10-CM

## 2022-10-07 DIAGNOSIS — I20.0 UNSTABLE ANGINA: ICD-10-CM

## 2022-10-07 DIAGNOSIS — I20.89 STABLE ANGINA: ICD-10-CM

## 2022-10-07 DIAGNOSIS — I25.10 CAD (CORONARY ARTERY DISEASE): ICD-10-CM

## 2022-10-07 DIAGNOSIS — I25.110 CORONARY ARTERY DISEASE INVOLVING NATIVE CORONARY ARTERY OF NATIVE HEART WITH UNSTABLE ANGINA PECTORIS: Primary | ICD-10-CM

## 2022-10-07 LAB
ALBUMIN SERPL BCP-MCNC: 4.3 G/DL (ref 3.5–5.2)
ALP SERPL-CCNC: 51 U/L (ref 55–135)
ALT SERPL W/O P-5'-P-CCNC: 30 U/L (ref 10–44)
ANION GAP SERPL CALC-SCNC: 8 MMOL/L (ref 8–16)
APTT PPP: 27.3 SEC (ref 23.3–35.1)
AST SERPL-CCNC: 24 U/L (ref 10–40)
BASOPHILS # BLD AUTO: 0.06 K/UL (ref 0–0.2)
BASOPHILS NFR BLD: 0.5 % (ref 0–1.9)
BILIRUB SERPL-MCNC: 0.8 MG/DL (ref 0.1–1)
BILIRUB UR QL STRIP: NEGATIVE
BUN SERPL-MCNC: 11 MG/DL (ref 6–20)
CALCIUM SERPL-MCNC: 8.8 MG/DL (ref 8.7–10.5)
CHLORIDE SERPL-SCNC: 102 MMOL/L (ref 95–110)
CLARITY UR: CLEAR
CO2 SERPL-SCNC: 26 MMOL/L (ref 23–29)
COLOR UR: YELLOW
CREAT SERPL-MCNC: 1.1 MG/DL (ref 0.5–1.4)
D DIMER PPP IA.FEU-MCNC: <0.27 UG/ML FEU
DIFFERENTIAL METHOD: ABNORMAL
EOSINOPHIL # BLD AUTO: 0.3 K/UL (ref 0–0.5)
EOSINOPHIL NFR BLD: 2.3 % (ref 0–8)
ERYTHROCYTE [DISTWIDTH] IN BLOOD BY AUTOMATED COUNT: 14.6 % (ref 11.5–14.5)
EST. GFR  (NO RACE VARIABLE): >60 ML/MIN/1.73 M^2
GLUCOSE SERPL-MCNC: 93 MG/DL (ref 70–110)
GLUCOSE UR QL STRIP: NEGATIVE
HCT VFR BLD AUTO: 55.5 % (ref 40–54)
HGB BLD-MCNC: 19 G/DL (ref 14–18)
HGB UR QL STRIP: NEGATIVE
IMM GRANULOCYTES # BLD AUTO: 0.04 K/UL (ref 0–0.04)
IMM GRANULOCYTES NFR BLD AUTO: 0.3 % (ref 0–0.5)
INR PPP: 1
KETONES UR QL STRIP: ABNORMAL
LEUKOCYTE ESTERASE UR QL STRIP: NEGATIVE
LYMPHOCYTES # BLD AUTO: 2.8 K/UL (ref 1–4.8)
LYMPHOCYTES NFR BLD: 23.6 % (ref 18–48)
MAGNESIUM SERPL-MCNC: 1.8 MG/DL (ref 1.6–2.6)
MCH RBC QN AUTO: 31.5 PG (ref 27–31)
MCHC RBC AUTO-ENTMCNC: 34.2 G/DL (ref 32–36)
MCV RBC AUTO: 92 FL (ref 82–98)
MONOCYTES # BLD AUTO: 0.8 K/UL (ref 0.3–1)
MONOCYTES NFR BLD: 6.5 % (ref 4–15)
NEUTROPHILS # BLD AUTO: 7.9 K/UL (ref 1.8–7.7)
NEUTROPHILS NFR BLD: 66.8 % (ref 38–73)
NITRITE UR QL STRIP: NEGATIVE
NRBC BLD-RTO: 0 /100 WBC
PH UR STRIP: 6 [PH] (ref 5–8)
PLATELET # BLD AUTO: 271 K/UL (ref 150–450)
PMV BLD AUTO: 9.8 FL (ref 9.2–12.9)
POTASSIUM SERPL-SCNC: 3.9 MMOL/L (ref 3.5–5.1)
PROT SERPL-MCNC: 7.5 G/DL (ref 6–8.4)
PROT UR QL STRIP: ABNORMAL
PROTHROMBIN TIME: 12.8 SEC (ref 11.4–13.7)
RBC # BLD AUTO: 6.03 M/UL (ref 4.6–6.2)
SARS-COV-2 RDRP RESP QL NAA+PROBE: NEGATIVE
SODIUM SERPL-SCNC: 136 MMOL/L (ref 136–145)
SP GR UR STRIP: 1.03 (ref 1–1.03)
TROPONIN I SERPL DL<=0.01 NG/ML-MCNC: <0.03 NG/ML
TROPONIN I SERPL DL<=0.01 NG/ML-MCNC: <0.03 NG/ML
URN SPEC COLLECT METH UR: ABNORMAL
UROBILINOGEN UR STRIP-ACNC: ABNORMAL EU/DL
WBC # BLD AUTO: 11.85 K/UL (ref 3.9–12.7)

## 2022-10-07 PROCEDURE — 96376 TX/PRO/DX INJ SAME DRUG ADON: CPT

## 2022-10-07 PROCEDURE — 85379 FIBRIN DEGRADATION QUANT: CPT | Performed by: EMERGENCY MEDICINE

## 2022-10-07 PROCEDURE — 93005 ELECTROCARDIOGRAM TRACING: CPT | Performed by: GENERAL PRACTICE

## 2022-10-07 PROCEDURE — 36415 COLL VENOUS BLD VENIPUNCTURE: CPT | Performed by: FAMILY MEDICINE

## 2022-10-07 PROCEDURE — 25000003 PHARM REV CODE 250: Performed by: FAMILY MEDICINE

## 2022-10-07 PROCEDURE — 83735 ASSAY OF MAGNESIUM: CPT | Performed by: EMERGENCY MEDICINE

## 2022-10-07 PROCEDURE — 85025 COMPLETE CBC W/AUTO DIFF WBC: CPT | Performed by: EMERGENCY MEDICINE

## 2022-10-07 PROCEDURE — U0002 COVID-19 LAB TEST NON-CDC: HCPCS | Performed by: EMERGENCY MEDICINE

## 2022-10-07 PROCEDURE — 96375 TX/PRO/DX INJ NEW DRUG ADDON: CPT

## 2022-10-07 PROCEDURE — G0378 HOSPITAL OBSERVATION PER HR: HCPCS

## 2022-10-07 PROCEDURE — 93010 ELECTROCARDIOGRAM REPORT: CPT | Mod: ,,, | Performed by: GENERAL PRACTICE

## 2022-10-07 PROCEDURE — 85730 THROMBOPLASTIN TIME PARTIAL: CPT | Performed by: EMERGENCY MEDICINE

## 2022-10-07 PROCEDURE — 96374 THER/PROPH/DIAG INJ IV PUSH: CPT

## 2022-10-07 PROCEDURE — 63600175 PHARM REV CODE 636 W HCPCS: Performed by: FAMILY MEDICINE

## 2022-10-07 PROCEDURE — 63600175 PHARM REV CODE 636 W HCPCS: Performed by: EMERGENCY MEDICINE

## 2022-10-07 PROCEDURE — 99284 EMERGENCY DEPT VISIT MOD MDM: CPT | Mod: 25

## 2022-10-07 PROCEDURE — 25000003 PHARM REV CODE 250: Performed by: EMERGENCY MEDICINE

## 2022-10-07 PROCEDURE — 84484 ASSAY OF TROPONIN QUANT: CPT | Performed by: EMERGENCY MEDICINE

## 2022-10-07 PROCEDURE — 93010 EKG 12-LEAD: ICD-10-PCS | Mod: ,,, | Performed by: GENERAL PRACTICE

## 2022-10-07 PROCEDURE — 85610 PROTHROMBIN TIME: CPT | Performed by: EMERGENCY MEDICINE

## 2022-10-07 PROCEDURE — 80053 COMPREHEN METABOLIC PANEL: CPT | Performed by: EMERGENCY MEDICINE

## 2022-10-07 PROCEDURE — 81003 URINALYSIS AUTO W/O SCOPE: CPT | Performed by: EMERGENCY MEDICINE

## 2022-10-07 PROCEDURE — 84484 ASSAY OF TROPONIN QUANT: CPT | Mod: 91 | Performed by: FAMILY MEDICINE

## 2022-10-07 RX ORDER — ONDANSETRON 2 MG/ML
4 INJECTION INTRAMUSCULAR; INTRAVENOUS
Status: COMPLETED | OUTPATIENT
Start: 2022-10-07 | End: 2022-10-07

## 2022-10-07 RX ORDER — AMOXICILLIN 250 MG
1 CAPSULE ORAL DAILY PRN
Status: DISCONTINUED | OUTPATIENT
Start: 2022-10-07 | End: 2022-10-10 | Stop reason: HOSPADM

## 2022-10-07 RX ORDER — METOPROLOL SUCCINATE 50 MG/1
50 TABLET, EXTENDED RELEASE ORAL NIGHTLY
Status: DISCONTINUED | OUTPATIENT
Start: 2022-10-07 | End: 2022-10-09

## 2022-10-07 RX ORDER — ONDANSETRON 2 MG/ML
4 INJECTION INTRAMUSCULAR; INTRAVENOUS EVERY 8 HOURS PRN
Status: DISCONTINUED | OUTPATIENT
Start: 2022-10-07 | End: 2022-10-10 | Stop reason: HOSPADM

## 2022-10-07 RX ORDER — MORPHINE SULFATE 4 MG/ML
4 INJECTION, SOLUTION INTRAMUSCULAR; INTRAVENOUS EVERY 4 HOURS PRN
Status: DISCONTINUED | OUTPATIENT
Start: 2022-10-07 | End: 2022-10-10 | Stop reason: HOSPADM

## 2022-10-07 RX ORDER — SODIUM CHLORIDE 0.9 % (FLUSH) 0.9 %
10 SYRINGE (ML) INJECTION
Status: DISCONTINUED | OUTPATIENT
Start: 2022-10-07 | End: 2022-10-10 | Stop reason: HOSPADM

## 2022-10-07 RX ORDER — LOSARTAN POTASSIUM 50 MG/1
100 TABLET ORAL DAILY
Status: DISCONTINUED | OUTPATIENT
Start: 2022-10-08 | End: 2022-10-09

## 2022-10-07 RX ORDER — ATORVASTATIN CALCIUM 40 MG/1
40 TABLET, FILM COATED ORAL NIGHTLY
Status: DISCONTINUED | OUTPATIENT
Start: 2022-10-07 | End: 2022-10-10 | Stop reason: HOSPADM

## 2022-10-07 RX ORDER — AMLODIPINE BESYLATE 5 MG/1
5 TABLET ORAL DAILY
Status: DISCONTINUED | OUTPATIENT
Start: 2022-10-08 | End: 2022-10-10

## 2022-10-07 RX ORDER — MORPHINE SULFATE 2 MG/ML
2 INJECTION, SOLUTION INTRAMUSCULAR; INTRAVENOUS EVERY 4 HOURS PRN
Status: DISCONTINUED | OUTPATIENT
Start: 2022-10-07 | End: 2022-10-10 | Stop reason: HOSPADM

## 2022-10-07 RX ORDER — BUPROPION HYDROCHLORIDE 150 MG/1
150 TABLET, EXTENDED RELEASE ORAL 2 TIMES DAILY
Status: DISCONTINUED | OUTPATIENT
Start: 2022-10-07 | End: 2022-10-10 | Stop reason: HOSPADM

## 2022-10-07 RX ORDER — AMITRIPTYLINE HYDROCHLORIDE 25 MG/1
25 TABLET, FILM COATED ORAL NIGHTLY PRN
Status: DISCONTINUED | OUTPATIENT
Start: 2022-10-07 | End: 2022-10-10 | Stop reason: HOSPADM

## 2022-10-07 RX ORDER — PROCHLORPERAZINE EDISYLATE 5 MG/ML
5 INJECTION INTRAMUSCULAR; INTRAVENOUS EVERY 6 HOURS PRN
Status: DISCONTINUED | OUTPATIENT
Start: 2022-10-07 | End: 2022-10-10 | Stop reason: HOSPADM

## 2022-10-07 RX ORDER — MORPHINE SULFATE 4 MG/ML
4 INJECTION, SOLUTION INTRAMUSCULAR; INTRAVENOUS
Status: COMPLETED | OUTPATIENT
Start: 2022-10-07 | End: 2022-10-07

## 2022-10-07 RX ORDER — ACETAMINOPHEN 325 MG/1
650 TABLET ORAL EVERY 4 HOURS PRN
Status: DISCONTINUED | OUTPATIENT
Start: 2022-10-07 | End: 2022-10-10 | Stop reason: HOSPADM

## 2022-10-07 RX ORDER — ASPIRIN 325 MG
325 TABLET ORAL
Status: COMPLETED | OUTPATIENT
Start: 2022-10-07 | End: 2022-10-07

## 2022-10-07 RX ORDER — TRAZODONE HYDROCHLORIDE 100 MG/1
100 TABLET ORAL NIGHTLY
COMMUNITY
End: 2022-10-07

## 2022-10-07 RX ADMIN — MORPHINE SULFATE 4 MG: 4 INJECTION, SOLUTION INTRAMUSCULAR; INTRAVENOUS at 02:10

## 2022-10-07 RX ADMIN — ONDANSETRON 4 MG: 2 INJECTION, SOLUTION INTRAMUSCULAR; INTRAVENOUS at 02:10

## 2022-10-07 RX ADMIN — METOPROLOL SUCCINATE 50 MG: 50 TABLET, FILM COATED, EXTENDED RELEASE ORAL at 09:10

## 2022-10-07 RX ADMIN — MORPHINE SULFATE 2 MG: 2 INJECTION, SOLUTION INTRAMUSCULAR; INTRAVENOUS at 09:10

## 2022-10-07 RX ADMIN — BUPROPION HYDROCHLORIDE 150 MG: 150 TABLET, EXTENDED RELEASE ORAL at 09:10

## 2022-10-07 RX ADMIN — NITROGLYCERIN 1 INCH: 20 OINTMENT TOPICAL at 01:10

## 2022-10-07 RX ADMIN — ASPIRIN 325 MG ORAL TABLET 325 MG: 325 PILL ORAL at 01:10

## 2022-10-07 RX ADMIN — ATORVASTATIN CALCIUM 40 MG: 40 TABLET, FILM COATED ORAL at 09:10

## 2022-10-07 RX ADMIN — AMITRIPTYLINE HYDROCHLORIDE 25 MG: 25 TABLET, FILM COATED ORAL at 09:10

## 2022-10-07 NOTE — ED NOTES
Report attempted x 8 min on hold- floor states room is not assigned to a nurse at this time.  Will attempt until successful with report handoff.  Pt remain s aao x4 in nad, no pain

## 2022-10-07 NOTE — Clinical Note
An angiography was performed of the left coronary arteries. The angiography was performed via power injection. The injected amount was 8 mL contrast at 4 mL/s. The PSI from the power injection was 400.

## 2022-10-07 NOTE — Clinical Note
The catheter was inserted into the and was inserted over the wire into the ostium   right coronary artery.

## 2022-10-07 NOTE — Clinical Note
An angiography was performed of the right coronary arteries. The angiography was performed via power injection. The injected amount was 6 mL contrast at 3 mL/s. The PSI from the power injection was 300. POST NTG INJECTION IC RCA

## 2022-10-07 NOTE — ED NOTES
Pt asked to be unhooked to go to bathroom.  Returned to room and while placing on monitor, RN noticed strong smell of smoke.  Pt admits to smoking instead of going to bathroom.  Reported this incident and instructed pt is he not to go outside with an IV and this is a non smoking facility

## 2022-10-07 NOTE — ED NOTES
1st contact with pt, for assistance in pt care, charge nurse and er tech at bedside, a&ox3, in no distress, calm

## 2022-10-07 NOTE — Clinical Note
An angiography was performed of the left coronary arteries. Multiple views were taken. The angiography was performed via power injection. The injected amount was 8 mL contrast at 4 mL/s. The PSI from the power injection was 400.

## 2022-10-07 NOTE — H&P
[facility]   History & Physical   Patient Name: Irving Tanner  MRN: [id number]  Admission Date: 10/7/2022 10:23 AM  Attending Physician:  Harish Ying MD  Primary Care Provider: FAIZAN Avalos  Face-to-Face encounter date: 10/07/2022    Patient information was obtained from patient, past medical records, ER physician, and ER records.     HISTORY OF PRESENT ILLNESS:     Irving Tanner is a 37 y.o. White male   With PMH of HTN, HPL, trigeminy, tobacco use  who presents with several week history of intermittent chest pain that is nonexertional.  Pain is pressure/aching with radiation into left arm.  Some diaphoresis. No palpitations. Mild SOB associated with CP.  Nonexertional. 5/10.      Sees cardiology and was seen yesterday.  Directed to present to ED today for CP/R/O.  ED evaluation significant for CBC, BMP, troponins normal.      EKG with T wave abnormality in anterior leads.    CP improved with nitro paste.        REVIEW OF SYSTEMS:     All systems reviewed and are negative except as noted per above.    PAST MEDICAL HISTORY:     Past Medical History:   Diagnosis Date    Hyperlipidemia     Hypertension        PAST SURGICAL HISTORY:     Past Surgical History:   Procedure Laterality Date    APPENDECTOMY      FOOT FRACTURE SURGERY      Incision and Drainage of Perirectal Abscess  06/22/2018           ALLERGIES:   Motrin [ibuprofen]    FAMILY HISTORY:     Family History   Problem Relation Age of Onset    Hypertension Mother     Diabetes Mother     Thyroid disease Mother     No Known Problems Father        SOCIAL HISTORY:     Social History     Tobacco Use    Smoking status: Every Day     Packs/day: 1.00     Years: 18.00     Pack years: 18.00     Types: Cigarettes    Smokeless tobacco: Never   Substance Use Topics    Alcohol use: No     Comment: occasionally        Social History     Substance and Sexual Activity   Sexual Activity Yes    Partners: Female    Birth  "control/protection: None        HOME MEDICATIONS:     Prior to Admission medications    Medication Sig Start Date End Date Taking? Authorizing Provider   amitriptyline (ELAVIL) 25 MG tablet Take 1 tablet (25 mg total) by mouth nightly as needed for Insomnia. 9/7/22 9/7/23 Yes FAIZAN Meyer   amlodipine-olmesartan (JENY) 5-40 mg per tablet Take 1 tablet by mouth once daily. 9/7/22 9/7/23 Yes FAIZAN Meyer   buPROPion (WELLBUTRIN SR) 150 MG TBSR 12 hr tablet Take 1 tablet (150 mg total) by mouth 2 (two) times daily. 9/7/22 9/7/23 Yes FAIZAN Meyer   metoprolol succinate (TOPROL-XL) 50 MG 24 hr tablet Take 1 tablet (50 mg total) by mouth every evening. 9/7/22 9/7/23 Yes FAIZAN Meyer   rosuvastatin (CRESTOR) 10 MG tablet Take 1 tablet (10 mg total) by mouth every evening. 9/7/22 9/7/23 Yes FAIZAN Meyer   traZODone (DESYREL) 100 MG tablet Take 100 mg by mouth every evening.  10/7/22  Historical Provider       PHYSICAL EXAM:     /65   Pulse 62   Temp 98.2 °F (36.8 °C) (Oral)   Resp 18   Ht 5' 10" (1.778 m)   Wt 90.7 kg (200 lb)   SpO2 95%   BMI 28.70 kg/m²     General:  Alert and oriented x4.  No acute distress  HEENT:  Normocephalic  Cardiovascular:  Regular rate and rhythm, no murmurs rubs or gallops.  No lower extremity edema.  Pulmonary:  Clear to auscultation bilaterally  Abdomen:  Soft, nontender, nondistended.  No guarding.  No rebound.  Negative Bermudez's.  Positive bowel sounds.  Extremity:  Moves all extremities equally.  Dermatology:  No rashes appreciated on exposed skin  Psychiatric:  Normal affect    LABS AND IMAGING:     Labs Reviewed   URINALYSIS, REFLEX TO URINE CULTURE - Abnormal; Notable for the following components:       Result Value    Protein, UA Trace (*)     Ketones, UA Trace (*)     Urobilinogen, UA 2.0-3.0 (*)     All other components within normal limits    Narrative:     Specimen Source->Urine   CBC W/ AUTO DIFFERENTIAL - Abnormal; Notable " for the following components:    Hemoglobin 19.0 (*)     Hematocrit 55.5 (*)     MCH 31.5 (*)     RDW 14.6 (*)     Gran # (ANC) 7.9 (*)     All other components within normal limits    Narrative:     hgb critical result(s) repeated. Called and verbal readback obtained   from Dr Long by LS1 10/07/2022 13:51   COMPREHENSIVE METABOLIC PANEL - Abnormal; Notable for the following components:    Alkaline Phosphatase 51 (*)     All other components within normal limits   D DIMER, QUANTITATIVE   PROTIME-INR   APTT   SARS-COV-2 RNA AMPLIFICATION, QUAL   MAGNESIUM   TROPONIN I       X-Ray Chest PA And Lateral   Final Result          ASSESSMENT & PLAN:   Irving Tanner is a 37 y.o. male admitted for    Active Hospital Problems    Diagnosis  POA    *Chest pain [R07.9]  Unknown    Cigarette nicotine dependence without complication [F17.210]  Yes    Mixed hyperlipidemia [E78.2]  Yes    Ventricular trigeminy [I49.8]  Yes    Essential hypertension [I10]  Yes      Resolved Hospital Problems   No resolved problems to display.        Plan    CP  Statin  ASA  O2 prn  Telemetry  [employer]ablocker  Morphine prn pain  Cardiology consult as known to them  Treadmill nuclear stress test tomorrow.    2. Tobacco  Advised to quit    3. HTN  Cont home meds    4. Ventricular trigeminy  Unclear significance.  Telemetry  Cardiology    Dispo: Anticipate D/C tomorrow after Stress test if negative.        Harish Ying MD  [facility] Hospitalist  10/07/2022

## 2022-10-07 NOTE — Clinical Note
An angiography was performed of the left coronary arteries. The angiography was performed via power injection. The injected amount was 8 mL contrast at 4 mL/s. The PSI from the power injection was 400. POST NTG IC LAD

## 2022-10-07 NOTE — ED PROVIDER NOTES
Encounter Date: 10/7/2022       History     Chief Complaint   Patient presents with    Chest Pain     X FEW WEEKS, RAD UNDER L ARM     Patient with a history of hypertension hypercholesterolemia, and tobacco use.  Patient with several week history of intermittent left sternal chest pain described as a squeezing sensation.  Pain originally with exertion.  Patient now having symptoms at rest.  Episodes usually last 5-10 minutes.  There is some dyspnea on exertion.  No pleurisy hemoptysis.  Currently chest pain-free.  Patient has a history of trigeminy.  Patient did see a cardiologist yesterday.    Review of patient's allergies indicates:   Allergen Reactions    Motrin [ibuprofen]      swelling     Past Medical History:   Diagnosis Date    Hyperlipidemia     Hypertension      Past Surgical History:   Procedure Laterality Date    APPENDECTOMY      FOOT FRACTURE SURGERY      Incision and Drainage of Perirectal Abscess  06/22/2018         Family History   Problem Relation Age of Onset    Hypertension Mother     Diabetes Mother     Thyroid disease Mother     No Known Problems Father      Social History     Tobacco Use    Smoking status: Every Day     Packs/day: 1.00     Years: 18.00     Pack years: 18.00     Types: Cigarettes    Smokeless tobacco: Never   Substance Use Topics    Alcohol use: No     Comment: occasionally    Drug use: No     Review of Systems   Constitutional:  Negative for chills and fever.   HENT:  Negative for sore throat.    Eyes:  Negative for photophobia and visual disturbance.   Respiratory:  Positive for shortness of breath.    Cardiovascular:  Positive for chest pain.   Gastrointestinal:  Negative for abdominal pain and vomiting.   Genitourinary:  Negative for dysuria.   Musculoskeletal:  Negative for joint swelling.   Skin:  Negative for rash.   Neurological:  Negative for weakness and headaches.   Psychiatric/Behavioral:  Negative for confusion.      Physical Exam     Initial Vitals  [10/07/22 1033]   BP Pulse Resp Temp SpO2   (!) 160/94 75 18 98.2 °F (36.8 °C) 98 %      MAP       --         Physical Exam    Nursing note and vitals reviewed.  Constitutional: He is not diaphoretic. No distress.   HENT:   Head: Normocephalic and atraumatic.   Eyes: Conjunctivae are normal.   Neck:   Normal range of motion.  Cardiovascular:  Regular rhythm.           Pulmonary/Chest: Breath sounds normal.   Abdominal: Abdomen is soft. no abdominal tenderness   Musculoskeletal:         General: Normal range of motion.      Cervical back: Normal range of motion.     Skin: No rash noted.   Psychiatric: He has a normal mood and affect.       ED Course   Procedures  Labs Reviewed   CBC W/ AUTO DIFFERENTIAL - Abnormal; Notable for the following components:       Result Value    Hemoglobin 19.0 (*)     Hematocrit 55.5 (*)     MCH 31.5 (*)     RDW 14.6 (*)     Gran # (ANC) 7.9 (*)     All other components within normal limits    Narrative:     hgb critical result(s) repeated. Called and verbal readback obtained   from Dr Long by LS1 10/07/2022 13:51   COMPREHENSIVE METABOLIC PANEL - Abnormal; Notable for the following components:    Alkaline Phosphatase 51 (*)     All other components within normal limits   D DIMER, QUANTITATIVE   PROTIME-INR   APTT   SARS-COV-2 RNA AMPLIFICATION, QUAL   MAGNESIUM   TROPONIN I   URINALYSIS, REFLEX TO URINE CULTURE        ECG Results              Repeat EKG 12-lead (In process)  Result time 10/07/22 13:33:42      In process by Interface, Lab In [facility] (10/07/22 13:33:42)                   Narrative:    Test Reason : R07.9,    Vent. Rate : 064 BPM     Atrial Rate : 064 BPM     P-R Int : 122 ms          QRS Dur : 090 ms      QT Int : 388 ms       P-R-T Axes : 045 090 006 degrees     QTc Int : 400 ms    Normal sinus rhythm  Rightward axis  ST and T wave abnormality, consider anterior ischemia  Abnormal ECG  When compared with ECG of 07-OCT-2022 10:31,  No significant change was  found    Referred By: SHERRI   SELF           Confirmed By:                                      EKG 12-lead (In process)  Result time 10/07/22 10:44:02      In process by Interface, Lab In [facility] (10/07/22 10:44:02)                   Narrative:    Test Reason : R07.9,    Vent. Rate : 073 BPM     Atrial Rate : 073 BPM     P-R Int : 118 ms          QRS Dur : 094 ms      QT Int : 378 ms       P-R-T Axes : 045 073 023 degrees     QTc Int : 416 ms    Normal sinus rhythm  T wave abnormality, consider anterior ischemia  Abnormal ECG  When compared with ECG of 11-JUN-2020 23:01,  Premature ventricular complexes are no longer Present  Questionable change in The axis  Non-specific change in ST segment in Lateral leads  T wave inversion now evident in Anterior leads    Referred By: SHERRI   SELF           Confirmed By:                                   Imaging Results              X-Ray Chest PA And Lateral (Final result)  Result time 10/07/22 11:05:13   Procedure changed from X-Ray Chest AP Portable     Final result by Melinda Mccall MD (10/07/22 11:05:13)                   Narrative:    Reason: Chest Pain    FINDINGS:  PA and lateral chest is compared to 6/22/2018 show normal cardiomediastinal silhouette.    Lungs are clear. Pulmonary vasculature is normal. Bones are normal.    IMPRESSION:  Normal chest.    Electronically signed by:  Melinda Mccall MD  10/7/2022 11:05 AM CDT Workstation: [id number]                                     Medications   aspirin tablet 325 mg (325 mg Oral Given 10/7/22 1313)   nitroGLYCERIN 2% TD oint ointment 1 inch (1 inch Topical (Top) Given 10/7/22 1313)   morphine injection 4 mg (4 mg Intravenous Given 10/7/22 1403)   ondansetron injection 4 mg (4 mg Intravenous Given 10/7/22 1401)     Medical Decision Making:   History:   Old Medical Records: I decided to obtain old medical records.  Clinical Tests:   Lab Tests: Reviewed  Radiological Study: Reviewed  Medical Tests:  Reviewed  ED Management:  Patient presents with chest pain.  Patient with recurrent chest pain while in the emergency department.  Patient with multiple risk factors.  Patient does have inverted T-waves and lateral leads.  Nonspecific ST changes and inferior leads.  Given multiple risk factors with EKG changes and continued chest pain hospitalist consulted for admission.                        Clinical Impression:   Final diagnoses:  [R07.9] Chest pain        ED Disposition Condition    Admit Stable                William Long MD  10/07/22 2413

## 2022-10-07 NOTE — Clinical Note
An angiography was performed of the right coronary arteries. The angiography was performed via power injection. The injected amount was 6 mL contrast at 3 mL/s. The PSI from the power injection was 300.

## 2022-10-07 NOTE — Clinical Note
The site was marked. The groin was prepped. The site was prepped with ChloraPrep and Prevail. The site was clipped. The patient was draped. The patient was positioned supine.

## 2022-10-08 ENCOUNTER — CLINICAL SUPPORT (OUTPATIENT)
Dept: CARDIOLOGY | Facility: HOSPITAL | Age: 37
DRG: 247 | End: 2022-10-08
Attending: FAMILY MEDICINE
Payer: COMMERCIAL

## 2022-10-08 ENCOUNTER — HOSPITAL ENCOUNTER (OUTPATIENT)
Dept: RADIOLOGY | Facility: HOSPITAL | Age: 37
Discharge: HOME | DRG: 247 | End: 2022-10-08
Attending: FAMILY MEDICINE
Payer: COMMERCIAL

## 2022-10-08 VITALS — BODY MASS INDEX: 28.44 KG/M2 | WEIGHT: 198.63 LBS | HEIGHT: 70 IN

## 2022-10-08 PROBLEM — I20.0 UNSTABLE ANGINA: Status: ACTIVE | Noted: 2022-10-08

## 2022-10-08 LAB
ANION GAP SERPL CALC-SCNC: 4 MMOL/L (ref 8–16)
APTT PPP: 27.7 SEC (ref 23.3–35.1)
APTT PPP: 33.7 SEC (ref 23.3–35.1)
AV INDEX (PROSTH): 0.75
AV MEAN GRADIENT: 4 MMHG
AV VALVE AREA: 4.21 CM2
BASOPHILS # BLD AUTO: 0.07 K/UL (ref 0–0.2)
BASOPHILS NFR BLD: 0.6 % (ref 0–1.9)
BSA FOR ECHO PROCEDURE: 2.11 M2
BUN SERPL-MCNC: 11 MG/DL (ref 6–20)
CALCIUM SERPL-MCNC: 8.6 MG/DL (ref 8.7–10.5)
CHLORIDE SERPL-SCNC: 105 MMOL/L (ref 95–110)
CHOLEST SERPL-MCNC: 152 MG/DL (ref 120–199)
CHOLEST/HDLC SERPL: 3.5 {RATIO} (ref 2–5)
CO2 SERPL-SCNC: 27 MMOL/L (ref 23–29)
CREAT SERPL-MCNC: 0.8 MG/DL (ref 0.5–1.4)
CV ECHO LV RWT: 0.43 CM
CV PHARM DOSE: 0.4 MG
CV STRESS BASE HR: 79 BPM
DIASTOLIC BLOOD PRESSURE: 78 MMHG
DIFFERENTIAL METHOD: ABNORMAL
DOP CALC AO VTI: 21.96 CM
DOP CALC LVOT AREA: 5.6 CM2
DOP CALC LVOT DIAMETER: 2.67 CM
DOP CALC LVOT PEAK VEL: 88.49 M/S
DOP CALC LVOT STROKE VOLUME: 92.5 CM3
DOP CALCLVOT PEAK VEL VTI: 16.53 CM
E WAVE DECELERATION TIME: 182.33 MSEC
E/A RATIO: 1.46
E/E' RATIO: 9.5 M/S
ECHO LV POSTERIOR WALL: 1.16 CM (ref 0.6–1.1)
EJECTION FRACTION: 55 %
EOSINOPHIL # BLD AUTO: 0.3 K/UL (ref 0–0.5)
EOSINOPHIL NFR BLD: 2.5 % (ref 0–8)
ERYTHROCYTE [DISTWIDTH] IN BLOOD BY AUTOMATED COUNT: 14.9 % (ref 11.5–14.5)
EST. GFR  (NO RACE VARIABLE): >60 ML/MIN/1.73 M^2
FRACTIONAL SHORTENING: 24 % (ref 28–44)
GLUCOSE SERPL-MCNC: 98 MG/DL (ref 70–110)
HCT VFR BLD AUTO: 52.4 % (ref 40–54)
HDLC SERPL-MCNC: 43 MG/DL (ref 40–75)
HDLC SERPL: 28.3 % (ref 20–50)
HGB BLD-MCNC: 17.6 G/DL (ref 14–18)
IMM GRANULOCYTES # BLD AUTO: 0.06 K/UL (ref 0–0.04)
IMM GRANULOCYTES NFR BLD AUTO: 0.5 % (ref 0–0.5)
INR PPP: 1.1
INTERVENTRICULAR SEPTUM: 1.16 CM (ref 0.6–1.1)
IVRT: 58.01 MSEC
LDLC SERPL CALC-MCNC: 62.6 MG/DL (ref 63–159)
LEFT ATRIUM SIZE: 4.37 CM
LEFT ATRIUM VOLUME INDEX MOD: 31.2 ML/M2
LEFT ATRIUM VOLUME MOD: 64.93 CM3
LEFT INTERNAL DIMENSION IN SYSTOLE: 4.1 CM (ref 2.1–4)
LEFT VENTRICLE DIASTOLIC VOLUME INDEX: 77.16 ML/M2
LEFT VENTRICLE DIASTOLIC VOLUME: 160.49 ML
LEFT VENTRICLE MASS INDEX: 122 G/M2
LEFT VENTRICLE SYSTOLIC VOLUME INDEX: 33.1 ML/M2
LEFT VENTRICLE SYSTOLIC VOLUME: 68.76 ML
LEFT VENTRICULAR INTERNAL DIMENSION IN DIASTOLE: 5.43 CM (ref 3.5–6)
LEFT VENTRICULAR MASS: 254.68 G
LV LATERAL E/E' RATIO: 7.92 M/S
LV SEPTAL E/E' RATIO: 11.88 M/S
LYMPHOCYTES # BLD AUTO: 2.7 K/UL (ref 1–4.8)
LYMPHOCYTES NFR BLD: 23 % (ref 18–48)
MAGNESIUM SERPL-MCNC: 2 MG/DL (ref 1.6–2.6)
MCH RBC QN AUTO: 31.3 PG (ref 27–31)
MCHC RBC AUTO-ENTMCNC: 33.6 G/DL (ref 32–36)
MCV RBC AUTO: 93 FL (ref 82–98)
MONOCYTES # BLD AUTO: 1 K/UL (ref 0.3–1)
MONOCYTES NFR BLD: 8.4 % (ref 4–15)
MV PEAK A VEL: 0.65 M/S
MV PEAK E VEL: 0.95 M/S
NEUTROPHILS # BLD AUTO: 7.6 K/UL (ref 1.8–7.7)
NEUTROPHILS NFR BLD: 65 % (ref 38–73)
NONHDLC SERPL-MCNC: 109 MG/DL
NRBC BLD-RTO: 0 /100 WBC
OHS CV CPX 1 MINUTE RECOVERY HEART RATE: 101 BPM
OHS CV CPX 85 PERCENT MAX PREDICTED HEART RATE MALE: 154
OHS CV CPX MAX PREDICTED HEART RATE: 181
OHS CV CPX PATIENT IS FEMALE: 0
OHS CV CPX PATIENT IS MALE: 1
OHS CV CPX PEAK DIASTOLIC BLOOD PRESSURE: 79 MMHG
OHS CV CPX PEAK HEAR RATE: 103 BPM
OHS CV CPX PEAK RATE PRESSURE PRODUCT: NORMAL
OHS CV CPX PEAK SYSTOLIC BLOOD PRESSURE: 133 MMHG
OHS CV CPX PERCENT MAX PREDICTED HEART RATE ACHIEVED: 57
OHS CV CPX RATE PRESSURE PRODUCT PRESENTING: NORMAL
PISA TR MAX VEL: 2.14 M/S
PLATELET # BLD AUTO: 248 K/UL (ref 150–450)
PMV BLD AUTO: 10 FL (ref 9.2–12.9)
POTASSIUM SERPL-SCNC: 3.9 MMOL/L (ref 3.5–5.1)
PROTHROMBIN TIME: 13.2 SEC (ref 11.4–13.7)
RA PRESSURE: 3 MMHG
RBC # BLD AUTO: 5.63 M/UL (ref 4.6–6.2)
RIGHT VENTRICULAR END-DIASTOLIC DIMENSION: 2.12 CM
SODIUM SERPL-SCNC: 136 MMOL/L (ref 136–145)
SYSTOLIC BLOOD PRESSURE: 134 MMHG
TDI LATERAL: 0.12 M/S
TDI SEPTAL: 0.08 M/S
TDI: 0.1 M/S
TR MAX PG: 18 MMHG
TRICUSPID ANNULAR PLANE SYSTOLIC EXCURSION: 2.44 CM
TRIGL SERPL-MCNC: 232 MG/DL (ref 30–150)
TROPONIN I SERPL DL<=0.01 NG/ML-MCNC: <0.03 NG/ML
TROPONIN I SERPL DL<=0.01 NG/ML-MCNC: <0.03 NG/ML
TV REST PULMONARY ARTERY PRESSURE: 21 MMHG
WBC # BLD AUTO: 11.74 K/UL (ref 3.9–12.7)

## 2022-10-08 PROCEDURE — 84484 ASSAY OF TROPONIN QUANT: CPT | Performed by: FAMILY MEDICINE

## 2022-10-08 PROCEDURE — A9502 TC99M TETROFOSMIN: HCPCS

## 2022-10-08 PROCEDURE — 25000242 PHARM REV CODE 250 ALT 637 W/ HCPCS: Performed by: HOSPITALIST

## 2022-10-08 PROCEDURE — 84484 ASSAY OF TROPONIN QUANT: CPT | Mod: 91 | Performed by: HOSPITALIST

## 2022-10-08 PROCEDURE — 93010 ELECTROCARDIOGRAM REPORT: CPT | Mod: ,,, | Performed by: SPECIALIST

## 2022-10-08 PROCEDURE — 93018 CV STRESS TEST I&R ONLY: CPT | Mod: ,,, | Performed by: GENERAL PRACTICE

## 2022-10-08 PROCEDURE — 80061 LIPID PANEL: CPT | Performed by: FAMILY MEDICINE

## 2022-10-08 PROCEDURE — 93306 TTE W/DOPPLER COMPLETE: CPT | Mod: 26,,, | Performed by: GENERAL PRACTICE

## 2022-10-08 PROCEDURE — 25000003 PHARM REV CODE 250: Performed by: HOSPITALIST

## 2022-10-08 PROCEDURE — 93306 TTE W/DOPPLER COMPLETE: CPT

## 2022-10-08 PROCEDURE — 85025 COMPLETE CBC W/AUTO DIFF WBC: CPT | Performed by: FAMILY MEDICINE

## 2022-10-08 PROCEDURE — 63600175 PHARM REV CODE 636 W HCPCS: Performed by: FAMILY MEDICINE

## 2022-10-08 PROCEDURE — 25000003 PHARM REV CODE 250: Performed by: FAMILY MEDICINE

## 2022-10-08 PROCEDURE — 63600175 PHARM REV CODE 636 W HCPCS: Performed by: HOSPITALIST

## 2022-10-08 PROCEDURE — 20000000 HC ICU ROOM

## 2022-10-08 PROCEDURE — 96365 THER/PROPH/DIAG IV INF INIT: CPT

## 2022-10-08 PROCEDURE — 93010 EKG 12-LEAD: ICD-10-PCS | Mod: ,,, | Performed by: SPECIALIST

## 2022-10-08 PROCEDURE — 83735 ASSAY OF MAGNESIUM: CPT | Performed by: FAMILY MEDICINE

## 2022-10-08 PROCEDURE — 85610 PROTHROMBIN TIME: CPT | Performed by: HOSPITALIST

## 2022-10-08 PROCEDURE — 36415 COLL VENOUS BLD VENIPUNCTURE: CPT | Performed by: FAMILY MEDICINE

## 2022-10-08 PROCEDURE — 93016 NUCLEAR STRESS TEST (CUPID ONLY): ICD-10-PCS | Mod: ,,, | Performed by: GENERAL PRACTICE

## 2022-10-08 PROCEDURE — 96376 TX/PRO/DX INJ SAME DRUG ADON: CPT

## 2022-10-08 PROCEDURE — 93306 ECHO (CUPID ONLY): ICD-10-PCS | Mod: 26,,, | Performed by: GENERAL PRACTICE

## 2022-10-08 PROCEDURE — 80048 BASIC METABOLIC PNL TOTAL CA: CPT | Performed by: FAMILY MEDICINE

## 2022-10-08 PROCEDURE — 36415 COLL VENOUS BLD VENIPUNCTURE: CPT | Performed by: HOSPITALIST

## 2022-10-08 PROCEDURE — 99223 PR INITIAL HOSPITAL CARE,LEVL III: ICD-10-PCS | Mod: 25,,, | Performed by: GENERAL PRACTICE

## 2022-10-08 PROCEDURE — 25000003 PHARM REV CODE 250: Performed by: NURSE PRACTITIONER

## 2022-10-08 PROCEDURE — 85730 THROMBOPLASTIN TIME PARTIAL: CPT | Mod: 91 | Performed by: HOSPITALIST

## 2022-10-08 PROCEDURE — 93016 CV STRESS TEST SUPVJ ONLY: CPT | Mod: ,,, | Performed by: GENERAL PRACTICE

## 2022-10-08 PROCEDURE — 93017 CV STRESS TEST TRACING ONLY: CPT

## 2022-10-08 PROCEDURE — 93005 ELECTROCARDIOGRAM TRACING: CPT | Performed by: SPECIALIST

## 2022-10-08 PROCEDURE — 93018 NUCLEAR STRESS TEST (CUPID ONLY): ICD-10-PCS | Mod: ,,, | Performed by: GENERAL PRACTICE

## 2022-10-08 PROCEDURE — 99223 1ST HOSP IP/OBS HIGH 75: CPT | Mod: 25,,, | Performed by: GENERAL PRACTICE

## 2022-10-08 RX ORDER — MAGNESIUM SULFATE 1 G/100ML
1 INJECTION INTRAVENOUS ONCE
Status: COMPLETED | OUTPATIENT
Start: 2022-10-08 | End: 2022-10-08

## 2022-10-08 RX ORDER — NITROGLYCERIN 0.4 MG/1
0.4 TABLET SUBLINGUAL EVERY 5 MIN PRN
Status: DISCONTINUED | OUTPATIENT
Start: 2022-10-08 | End: 2022-10-10 | Stop reason: HOSPADM

## 2022-10-08 RX ORDER — LANOLIN ALCOHOL/MO/W.PET/CERES
800 CREAM (GRAM) TOPICAL
Status: DISCONTINUED | OUTPATIENT
Start: 2022-10-08 | End: 2022-10-10 | Stop reason: HOSPADM

## 2022-10-08 RX ORDER — HEPARIN SODIUM,PORCINE/D5W 25000/250
0-40 INTRAVENOUS SOLUTION INTRAVENOUS CONTINUOUS
Status: DISCONTINUED | OUTPATIENT
Start: 2022-10-08 | End: 2022-10-09

## 2022-10-08 RX ORDER — ISOSORBIDE MONONITRATE 30 MG/1
30 TABLET, EXTENDED RELEASE ORAL DAILY
Status: DISCONTINUED | OUTPATIENT
Start: 2022-10-08 | End: 2022-10-10 | Stop reason: HOSPADM

## 2022-10-08 RX ORDER — ASPIRIN 325 MG
325 TABLET, DELAYED RELEASE (ENTERIC COATED) ORAL DAILY
Status: DISCONTINUED | OUTPATIENT
Start: 2022-10-08 | End: 2022-10-09

## 2022-10-08 RX ORDER — NITROGLYCERIN 0.4 MG/1
TABLET SUBLINGUAL
Status: DISPENSED
Start: 2022-10-08 | End: 2022-10-09

## 2022-10-08 RX ORDER — REGADENOSON 0.08 MG/ML
0.4 INJECTION, SOLUTION INTRAVENOUS
Status: COMPLETED | OUTPATIENT
Start: 2022-10-08 | End: 2022-10-08

## 2022-10-08 RX ORDER — SODIUM,POTASSIUM PHOSPHATES 280-250MG
2 POWDER IN PACKET (EA) ORAL
Status: DISCONTINUED | OUTPATIENT
Start: 2022-10-08 | End: 2022-10-10 | Stop reason: HOSPADM

## 2022-10-08 RX ADMIN — NITROGLYCERIN 0.4 MG: 0.4 TABLET SUBLINGUAL at 06:10

## 2022-10-08 RX ADMIN — HEPARIN SODIUM 12 UNITS/KG/HR: 10000 INJECTION, SOLUTION INTRAVENOUS at 02:10

## 2022-10-08 RX ADMIN — NITROGLYCERIN 0.4 MG: 0.4 TABLET SUBLINGUAL at 01:10

## 2022-10-08 RX ADMIN — NITROGLYCERIN 0.4 MG: 0.4 TABLET SUBLINGUAL at 08:10

## 2022-10-08 RX ADMIN — METOPROLOL SUCCINATE 50 MG: 50 TABLET, FILM COATED, EXTENDED RELEASE ORAL at 08:10

## 2022-10-08 RX ADMIN — ASPIRIN 325 MG: 325 TABLET, COATED ORAL at 08:10

## 2022-10-08 RX ADMIN — ISOSORBIDE MONONITRATE 30 MG: 30 TABLET, EXTENDED RELEASE ORAL at 12:10

## 2022-10-08 RX ADMIN — MAGNESIUM SULFATE IN DEXTROSE 1 G: 10 INJECTION, SOLUTION INTRAVENOUS at 12:10

## 2022-10-08 RX ADMIN — LOSARTAN POTASSIUM 100 MG: 50 TABLET, FILM COATED ORAL at 08:10

## 2022-10-08 RX ADMIN — ATORVASTATIN CALCIUM 40 MG: 40 TABLET, FILM COATED ORAL at 08:10

## 2022-10-08 RX ADMIN — MORPHINE SULFATE 2 MG: 2 INJECTION, SOLUTION INTRAMUSCULAR; INTRAVENOUS at 10:10

## 2022-10-08 RX ADMIN — BUPROPION HYDROCHLORIDE 150 MG: 150 TABLET, EXTENDED RELEASE ORAL at 08:10

## 2022-10-08 RX ADMIN — AMLODIPINE BESYLATE 5 MG: 5 TABLET ORAL at 08:10

## 2022-10-08 RX ADMIN — ACETAMINOPHEN 650 MG: 325 TABLET ORAL at 01:10

## 2022-10-08 RX ADMIN — REGADENOSON 0.4 MG: 0.08 INJECTION, SOLUTION INTRAVENOUS at 10:10

## 2022-10-08 RX ADMIN — MORPHINE SULFATE 4 MG: 4 INJECTION, SOLUTION INTRAMUSCULAR; INTRAVENOUS at 04:10

## 2022-10-08 NOTE — PROGRESS NOTES
Patient arrived via wheel chair. Wife accompanied. All belongings with patient including cell phone. Patient heparin drip continues infusing. Patient placed on cardiac monitor. Goes in and out of NSR and Trigeminy. Patient is NPO for possible procedure today or tomorrow. Patient is aware that he is to stay NPO. Rates chest pain 2-3/10. Slight shortness of breath noted with ambulation.

## 2022-10-08 NOTE — PLAN OF CARE
[facility]  Initial Discharge Assessment       Primary Care Provider: FAIZAN Avalos    Admission Diagnosis: Chest pain [R07.9]    Admission Date: 10/7/2022    Discharge assessment completed with pt's significant other at bedside - patient off unit to stress test. Information verified as correct on facesheet. Reports independence in ADLs. Denies HH/HD/DME at home. Discharge plan is home. S/O to provide transport on discharge.       Discharge Barriers Identified: None    Payor: BLUE CROSS BLUE SHIELD / Plan: BCBS BLUE SAVER PPO - HD / Product Type: PPO /     Extended Emergency Contact Information  Primary Emergency Contact: Lora Danielle  Address: [address] [place] [id number] [facility]  Home Phone: [phone]  Mobile Phone: [phone]  Relation: Significant other    Discharge Plan A: Home  Discharge Plan B: Home with family      [id number] - [facility] [address] [facility] [address] [facility] & [address] [address]  Phone: [phone] Fax: [phone]      Initial Assessment (most recent)       Adult Discharge Assessment - 10/08/22 0934          Discharge Assessment    Assessment Type Discharge Planning Assessment     Confirmed/corrected address, phone number and insurance Yes     Confirmed Demographics Correct on Facesheet     Source of Information family     Does patient/caregiver understand observation status Yes     Communicated BOB with patient/caregiver Yes     Reason For Admission chest pain     Lives With child(isis), dependent;significant other     Facility Arrived From: home     Do you expect to return to your current living situation? Yes     Do you have help at home or someone to help you manage your care at home? Yes     Prior to hospitilization cognitive status: Alert/Oriented     Current cognitive status: Alert/Oriented     Walking or Climbing Stairs Difficulty none     Dressing/Bathing Difficulty none      Equipment Currently Used at Home none     Readmission within 30 days? No     Patient currently being followed by outpatient case management? No     Do you currently have service(s) that help you manage your care at home? No     Do you take prescription medications? No     Do you have prescription coverage? Yes     Coverage BCBS     Do you have any problems affording any of your prescribed medications? No     Who is going to help you get home at discharge? independent     How do you get to doctors appointments? car, drives self     Are you on dialysis? No     Do you take coumadin? No     Discharge Plan A Home     Discharge Plan B Home with family     DME Needed Upon Discharge  none     Discharge Plan discussed with: Spouse/sig other     Discharge Barriers Identified None

## 2022-10-08 NOTE — NURSING
Patient called for SN. Patient having cp. Patient had a stress test earlier today. This patient rated his cp at 6 with radiating pain to his shoulder and arm. I gave this patient a SL nitro at 1344. Bp stable. I called and notified Dr. Pascual. Stat orders written for this patient. After 5 minutes his cp was still a 5. Bp stable. I gave him another SL nitro. He now rates it at 3-4. He says it is manageable and refused a 3rd nitro. Lab in and tanvi labs. Ekg tech in and doing ekg.

## 2022-10-08 NOTE — PLAN OF CARE
Care plan reviewed. Safety  maintained. Patient transferred to ICU today for unstable angina. Will possibly go to cath lab today, if not then tomorrow. Patient c/o chest pain of 2-3/10. Shortness of breath with exertion. Heparin drip infusing. Next PTT is at 2100. Patient wife at bedside. Patient seen by Dr. Pascual and Dr. Hernandez.

## 2022-10-08 NOTE — PROGRESS NOTES
[facility] Medicine  Progress Note    Patient name: Irving Tanner  MRN: [id number]  Admit Date: 10/7/2022   LOS: 0 days     SUBJECTIVE:     Principal problem: Unstable angina    Interval History:  Patient was seen and examined at bedside, his pain appears very typical in nature, required nitroglycerin last night.  Otherwise hemodynamically stable, going for stress test.  Later in the afternoon after stress test patient experiencing severe chest pain requiring nitroglycerin.     Scheduled Meds:   amLODIPine  5 mg Oral Daily    aspirin  325 mg Oral Daily    atorvastatin  40 mg Oral QHS    buPROPion  150 mg Oral BID    heparin (PORCINE)  50.1 Units/kg (Adjusted) Intravenous Once    isosorbide mononitrate  30 mg Oral Daily    losartan  100 mg Oral Daily    metoprolol succinate  50 mg Oral QHS    nitroGLYCERIN         Continuous Infusions:   heparin (porcine) in D5W       PRN Meds:acetaminophen, amitriptyline, heparin (PORCINE), heparin (PORCINE), magnesium oxide, magnesium oxide, morphine, morphine, nitroGLYCERIN, ondansetron, potassium bicarbonate, potassium bicarbonate, potassium bicarbonate, potassium, sodium phosphates, potassium, sodium phosphates, potassium, sodium phosphates, prochlorperazine, senna-docusate 8.6-50 mg, sodium chloride 0.9%    Review of patient's allergies indicates:   Allergen Reactions    Motrin [ibuprofen]      swelling       Review of Systems: As per interval history    OBJECTIVE:     Vital Signs (Most Recent)  Temp: 98.7 °F (37.1 °C) (10/08/22 0700)  Pulse: 78 (10/08/22 1405)  Resp: 16 (10/08/22 0700)  BP: 115/62 (10/08/22 1405)  SpO2: 97 % (10/08/22 0700)    Vital Signs Range (Last 24H):  Temp:  [98 °F (36.7 °C)-98.7 °F (37.1 °C)]   Pulse:  [56-79]   Resp:  [16-20]   BP: (115-142)/(62-88)   SpO2:  [95 %-98 %]     I & O (Last 24H):  Intake/Output Summary (Last 24 hours) at 10/8/2022 1442  Last data filed at 10/8/2022 1036  Gross per 24 hour   Intake 720 ml   Output  100 ml   Net 620 ml       Physical Exam:  General:  Mild distress due to pain  Eyes: No conjunctival injection. No scleral icterus.  ENT: Hearing grossly intact. No discharge from ears. No nasal discharge.   Neck: Supple, trachea midline. No JVD  CVS: RRR. No LE edema BL  Lungs:  No tachypnea or accessory muscle use.  Clear to auscultation bilaterally  Abdomen:  Soft, nontender and nondistended.  No organomegaly  Neuro: AOx3. Moves all extremities. Follows commands. Responds appropriately   Skin:  No rash or erythema noted    Laboratory:  I have reviewed all pertinent lab results within the past 24 hours.    Diagnostic Results:  Reviewed all imaging    ASSESSMENT/PLAN:     37-year-old with heavy smoking dependence, hypertension, history of ventricular trigeminy came with worsening angina found to have positive stress test.     Active Hospital Problems    Diagnosis  POA    *Unstable angina [I20.0]  Yes    Chest pain [R07.9]  Unknown    Cigarette nicotine dependence without complication [F17.210]  Yes    Mixed hyperlipidemia [E78.2]  Yes    Ventricular trigeminy [I49.8]  Yes    Essential hypertension [I10]  Yes      Resolved Hospital Problems   No resolved problems to display.         Plan:   His symptoms are progressively getting worse, overall presentation is concerning for unstable angina  Stress test came back positive  Will start heparin GTT, continue aspirin, statin, beta-blocker  P.r.n. nitroglycerin  P.r.n. morphine  Transfer to ICU  Discussed case with Cardiology, may pursue left heart catheterization later in the day  NPO until cleared by Cardiology        VTE Risk Mitigation (From admission, onward)           Ordered     heparin 25,000 units in dextrose 5% (100 units/ml) IV bolus from bag - ADDITIONAL PRN BOLUS - 60 units/kg (max bolus 4000 units)  As needed (PRN)        Question:  Heparin Infusion Adjustment (DO NOT MODIFY ANSWER)  Answer:  [email or web address]\epic\Images\Pharmacy\HeparinInfusions\heparin LOW  INTENSITY nomogram for [facility] [id number]    10/08/22 1349     heparin 25,000 units in dextrose 5% (100 units/ml) IV bolus from bag - ADDITIONAL PRN BOLUS - 30 units/kg (max bolus 4000 units)  As needed (PRN)        Question:  Heparin Infusion Adjustment (DO NOT MODIFY ANSWER)  Answer:  [email or web address]\epic\Images\Pharmacy\HeparinInfusions\heparin LOW INTENSITY nomogram for [facility] [id number]    10/08/22 1349     heparin 25,000 units in dextrose 5% (100 units/ml) IV bolus from bag INITIAL BOLUS (max bolus 4000 units)  Once        Question:  Heparin Infusion Adjustment (DO NOT MODIFY ANSWER)  Answer:  [email or web address]\epic\Images\Pharmacy\HeparinInfusions\heparin LOW INTENSITY nomogram for [facility] [id number]    10/08/22 1349     heparin 25,000 units in dextrose 5% 250 mL (100 units/mL) infusion LOW INTENSITY nomogram - [facility]  Continuous        Question Answer Comment   Heparin Infusion Adjustment (DO NOT MODIFY ANSWER) [email or web address]\epic\Images\Pharmacy\HeparinInfusions\heparin LOW INTENSITY nomogram for [facility] [id number]    Begin at (in units/kg/hr) 12        10/08/22 1349     IP VTE LOW RISK PATIENT  Once         10/07/22 1902     Place sequential compression device  Until discontinued         10/07/22 1902                        Department Hospital Medicine  [facility]  Tessy Pascual MD  Date of service: 10/08/2022

## 2022-10-08 NOTE — CONSULTS
[facility]  Department of Cardiology  Consult Note      PATIENT NAME: Irving Tanner  MRN: [id number]  TODAY'S DATE: 10/08/2022  ADMIT DATE: 10/7/2022                          CONSULT REQUESTED BY: Tessy Pascual MD    SUBJECTIVE     PRINCIPAL PROBLEM: Chest pain      REASON FOR CONSULT:  Chest Pain      HPI:    37 Year old male patient admitted with chest pain. He has a PMH significant for Tobacco abuse, HTN and Dyslpidemia. He saw a cardiologist last week Dr. Garcia but there I no one currently covering for that group in [place] therefore I saw patient and did stress test as ok with patient and Dr. Rich. Patient denies chest pain currently. No arm neck or jaw pain currently. Patient states it comes and goes can be at rest or with exertion and feels as if someone is squeezing his heart. It did go away with nitropaste. EKG is abnormal. Shows st -t abnormality anterior ischemia. Troponin is negative. .    FROM H AND P     Irving Tanner is a 37 y.o. White male   With PMH of HTN, HPL, trigeminy, tobacco use  who presents with several week history of intermittent chest pain that is nonexertional.  Pain is pressure/aching with radiation into left arm.  Some diaphoresis. No palpitations. Mild SOB associated with CP.  Nonexertional. 5/10.       Sees cardiology and was seen yesterday.  Directed to present to ED today for CP/R/O.  ED evaluation significant for CBC, BMP, troponins normal.       EKG with T wave abnormality in anterior leads.     CP improved with nitro paste.Irving Tanner is a 37 y.o. White male   With PMH of HTN, HPL, trigeminy, tobacco use  who presents with several week history of intermittent chest pain that is nonexertional.  Pain is pressure/aching with radiation into left arm.  Some diaphoresis. No palpitations. Mild SOB associated with CP.  Nonexertional. 5/10.       Sees cardiology and was seen yesterday.  Directed to present to ED today for CP/R/O.  ED evaluation  significant for CBC, BMP, troponins normal.       EKG with T wave abnormality in anterior leads.     CP improved with nitro paste.        Review of patient's allergies indicates:   Allergen Reactions    Motrin [ibuprofen]      swelling       Past Medical History:   Diagnosis Date    Hyperlipidemia     Hypertension      Past Surgical History:   Procedure Laterality Date    APPENDECTOMY      FOOT FRACTURE SURGERY      Incision and Drainage of Perirectal Abscess  06/22/2018         Social History     Tobacco Use    Smoking status: Every Day     Packs/day: 1.00     Years: 18.00     Pack years: 18.00     Types: Cigarettes    Smokeless tobacco: Never   Substance Use Topics    Alcohol use: No     Comment: occasionally    Drug use: No        REVIEW OF SYSTEMS  CONSTITUTIONAL: Negative for chills, fatigue and fever.   EYES: No double vision, No blurred vision  NEURO: No headaches, No dizziness  RESPIRATORY: Negative for cough, shortness of breath and wheezing.    CARDIOVASCULAR: +cp  GI: Negative for abdominal pain, No melena, diarrhea, nausea and vomiting.   : Negative for dysuria and frequency, Negative for hematuria  SKIN: Negative for bruising, Negative for edema or discoloration noted.   ENDOCRINE: Negative for polyphagia, Negative for heat intolerance, Negative for cold intolerance  PSYCHIATRIC: Negative for depression, Negative for anxiety, Negative for memory loss  MUSCULOSKELETAL: Negative for neck pain, Negative for muscle weakness, Negative for back pain     OBJECTIVE     VITAL SIGNS (Most Recent)  Temp: 98.7 °F (37.1 °C) (10/08/22 0700)  Pulse: 71 (10/08/22 0700)  Resp: 16 (10/08/22 0700)  BP: (!) 142/75 (10/08/22 0700)  SpO2: 97 % (10/08/22 0700)    VENTILATION STATUS  Resp: 16 (10/08/22 0700)  SpO2: 97 % (10/08/22 0700)       I & O (Last 24H):  Intake/Output Summary (Last 24 hours) at 10/8/2022 0917  Last data filed at 10/7/2022 2330  Gross per 24 hour   Intake 720 ml   Output 100 ml   Net 620 ml        WEIGHTS  Wt Readings from Last 3 Encounters:   10/08/22 0039 90.1 kg (198 lb 9.6 oz)   10/07/22 1944 90.1 kg (198 lb 9.6 oz)   10/07/22 1033 90.7 kg (200 lb)   09/07/22 1358 90.4 kg (199 lb 3.2 oz)   03/07/22 1256 87.5 kg (193 lb)       PHYSICAL EXAM  GENERAL: well built, well nourished, well-developed in no apparent distress alert and oriented.   HEENT: Normocephalic. Pupils normal and conjunctivae normal.  Mucous membranes normal, no cyanosis or icterus, trachea central,no pallor or icterus is noted..   NECK: No JVD. No bruit..   THYROID: Thyroid not enlarged. No nodules present..   CARDIAC: Regular rate and rhythm. S1 is normal.S2 is normal.No gallops, clicks or murmurs noted at this time.  CHEST ANATOMY: normal.   LUNGS: Clear to auscultation. No wheezing or rhonchi..   ABDOMEN: Soft no masses or organomegaly.  No abdomen pulsations or bruits.  Normal bowel sounds. No pulsations and no masses felt, No guarding or rebound.   URINARY: No de la paz catheter   EXTREMITIES: No cyanosis, clubbing or edema noted at this time., no calf tenderness bilaterally.   PERIPHERAL VASCULAR SYSTEM: Good palpable distal pulses.   CENTRAL NERVOUS SYSTEM: No focal motor or sensory deficits noted.   SKIN: Skin without lesions, moist, well perfused.   MUSCLE STRENGTH & TONE: No noteable weakness, atrophy or abnormal movement.     HOME MEDICATIONS:  No current facility-administered medications on file prior to encounter.     Current Outpatient Medications on File Prior to Encounter   Medication Sig Dispense Refill    amitriptyline (ELAVIL) 25 MG tablet Take 1 tablet (25 mg total) by mouth nightly as needed for Insomnia. 90 tablet 1    amlodipine-olmesartan (JENY) 5-40 mg per tablet Take 1 tablet by mouth once daily. 90 tablet 1    buPROPion (WELLBUTRIN SR) 150 MG TBSR 12 hr tablet Take 1 tablet (150 mg total) by mouth 2 (two) times daily. 180 tablet 2    metoprolol succinate (TOPROL-XL) 50 MG 24 hr tablet Take 1 tablet (50 mg total)  by mouth every evening. 90 tablet 1    rosuvastatin (CRESTOR) 10 MG tablet Take 1 tablet (10 mg total) by mouth every evening. 90 tablet 1       SCHEDULED MEDS:   amLODIPine  5 mg Oral Daily    aspirin  325 mg Oral Daily    atorvastatin  40 mg Oral QHS    buPROPion  150 mg Oral BID    losartan  100 mg Oral Daily    magnesium sulfate IVPB  1 g Intravenous Once    metoprolol succinate  50 mg Oral QHS       CONTINUOUS INFUSIONS:    PRN MEDS:acetaminophen, amitriptyline, magnesium oxide, magnesium oxide, morphine, morphine, ondansetron, potassium bicarbonate, potassium bicarbonate, potassium bicarbonate, potassium, sodium phosphates, potassium, sodium phosphates, potassium, sodium phosphates, prochlorperazine, senna-docusate 8.6-50 mg, sodium chloride 0.9%    LABS AND DIAGNOSTICS     CBC LAST 3 DAYS  Recent Labs   Lab 10/07/22  1310 10/08/22  0056   WBC 11.85 11.74   RBC 6.03 5.63   HGB 19.0* 17.6   HCT 55.5* 52.4   MCV 92 93   MCH 31.5* 31.3*   MCHC 34.2 33.6   RDW 14.6* 14.9*    248   MPV 9.8 10.0   GRAN 66.8  7.9* 65.0  7.6   LYMPH 23.6  2.8 23.0  2.7   MONO 6.5  0.8 8.4  1.0   BASO 0.06 0.07   NRBC 0 0       COAGULATION LAST 3 DAYS  Recent Labs   Lab 10/07/22  1310   LABPT 12.8   INR 1.0   APTT 27.3       CHEMISTRY LAST 3 DAYS  Recent Labs   Lab 10/07/22  1310 10/08/22  0056    136   K 3.9 3.9    105   CO2 26 27   ANIONGAP 8 4*   BUN 11 11   CREATININE 1.1 0.8   GLU 93 98   CALCIUM 8.8 8.6*   MG 1.8 2.0   ALBUMIN 4.3  --    PROT 7.5  --    ALKPHOS 51*  --    ALT 30  --    AST 24  --    BILITOT 0.8  --        CARDIAC PROFILE LAST 3 DAYS  Recent Labs   Lab 10/07/22  1310 10/07/22  1910 10/08/22  0056   TROPONINI <0.030 <0.030 <0.030       ENDOCRINE LAST 3 DAYS  No results for input(s): TSH, PROCAL in the last 168 hours.    LAST ARTERIAL BLOOD GAS  ABG  No results for input(s): PH, PO2, PCO2, HCO3, BE in the last 168 hours.    LAST 7 DAYS MICROBIOLOGY   Microbiology Results (last 7 days)        ** No results found for the last 168 hours. **            MOST RECENT IMAGING  X-Ray Chest PA And Lateral  Reason: Chest Pain    FINDINGS:  PA and lateral chest is compared to 6/22/2018 show normal cardiomediastinal silhouette.    Lungs are clear. Pulmonary vasculature is normal. Bones are normal.    IMPRESSION:  Normal chest.    Electronically signed by:  Melinda Mccall MD  10/7/2022 11:05 AM CDT Workstation: [id number]      ECHOCARDIOGRAM RESULTS (last 5)  No results found for this or any previous visit.      CURRENT/PREVIOUS VISIT EKG  Results for orders placed or performed during the hospital encounter of 10/07/22   Repeat EKG 12-lead    Collection Time: 10/07/22  1:11 PM    Narrative    Test Reason : R07.9,    Vent. Rate : 064 BPM     Atrial Rate : 064 BPM     P-R Int : 122 ms          QRS Dur : 090 ms      QT Int : 388 ms       P-R-T Axes : 045 090 006 degrees     QTc Int : 400 ms    Normal sinus rhythm  Rightward axis  ST and T wave abnormality, consider anterior ischemia  Abnormal ECG  When compared with ECG of 07-OCT-2022 10:31,  No significant change was found  Confirmed by David KNUTSON, Jose Rafael MURILLO ([id number]) on 10/7/2022 6:58:21 PM    Referred By: [email or web address]   SELF           Confirmed By:Jose Rafael Hernandez MD           ASSESSMENT/PLAN:     Active Hospital Problems    Diagnosis    *Chest pain    Cigarette nicotine dependence without complication    Mixed hyperlipidemia    Ventricular trigeminy    Essential hypertension       ASSESSMENT & PLAN:     Chest Pain  HTN  Dyslipidemia  Smoker  Abnormal EKG        RECOMMENDATIONS:      Await Stress test  Currently on amlodipine, and olmesartan at home along with metoprolol and crestor   Continue this  Add Imdur  Further recommendations based upon hospital course and stress results      Ann Arthur NP  [facility]  Department of Cardiology  Date of Service: 10/08/2022        I have personally interviewed and examined the patient, I have reviewed the Nurse  Practitioner's history and physical, assessment, and plan. I agree with the findings and plan.  NUCLEAR POSITIVE FOR ISCHEMIA ANTERIOR.  APICAL AREA.  PATIENT CONTINUES TO HAVE CHEST PAIN OFF AND ON NOT ABLE TO BE CONTROLLED MEDICAL MANAGEMENT, PVCS AND ECTOPY.  BE TRANSFERRED TO ICU FOR CORONARY ANGIOGRAM WHEN STABLE    Jose Rafael Hernandez M.D.  [facility]  Department of Cardiology  Date of Service: 10/08/2022  9:56 AM

## 2022-10-09 LAB
ALBUMIN SERPL BCP-MCNC: 3.8 G/DL (ref 3.5–5.2)
ANION GAP SERPL CALC-SCNC: 10 MMOL/L (ref 8–16)
ANION GAP SERPL CALC-SCNC: 8 MMOL/L (ref 8–16)
APTT PPP: 58.3 SEC (ref 23.3–35.1)
APTT PPP: 58.5 SEC (ref 23.3–35.1)
BASOPHILS # BLD AUTO: 0.06 K/UL (ref 0–0.2)
BASOPHILS # BLD AUTO: 0.08 K/UL (ref 0–0.2)
BASOPHILS NFR BLD: 0.5 % (ref 0–1.9)
BASOPHILS NFR BLD: 0.8 % (ref 0–1.9)
BUN SERPL-MCNC: 11 MG/DL (ref 6–20)
BUN SERPL-MCNC: 9 MG/DL (ref 6–20)
CALCIUM SERPL-MCNC: 8.4 MG/DL (ref 8.7–10.5)
CALCIUM SERPL-MCNC: 8.7 MG/DL (ref 8.7–10.5)
CHLORIDE SERPL-SCNC: 104 MMOL/L (ref 95–110)
CHLORIDE SERPL-SCNC: 105 MMOL/L (ref 95–110)
CO2 SERPL-SCNC: 24 MMOL/L (ref 23–29)
CO2 SERPL-SCNC: 25 MMOL/L (ref 23–29)
CREAT SERPL-MCNC: 0.7 MG/DL (ref 0.5–1.4)
CREAT SERPL-MCNC: 0.9 MG/DL (ref 0.5–1.4)
DIFFERENTIAL METHOD: ABNORMAL
DIFFERENTIAL METHOD: ABNORMAL
EOSINOPHIL # BLD AUTO: 0.2 K/UL (ref 0–0.5)
EOSINOPHIL # BLD AUTO: 0.2 K/UL (ref 0–0.5)
EOSINOPHIL NFR BLD: 1.9 % (ref 0–8)
EOSINOPHIL NFR BLD: 2.4 % (ref 0–8)
ERYTHROCYTE [DISTWIDTH] IN BLOOD BY AUTOMATED COUNT: 14.4 % (ref 11.5–14.5)
ERYTHROCYTE [DISTWIDTH] IN BLOOD BY AUTOMATED COUNT: 14.5 % (ref 11.5–14.5)
EST. GFR  (NO RACE VARIABLE): >60 ML/MIN/1.73 M^2
EST. GFR  (NO RACE VARIABLE): >60 ML/MIN/1.73 M^2
GLUCOSE SERPL-MCNC: 93 MG/DL (ref 70–110)
GLUCOSE SERPL-MCNC: 95 MG/DL (ref 70–110)
HCT VFR BLD AUTO: 53.1 % (ref 40–54)
HCT VFR BLD AUTO: 53.6 % (ref 40–54)
HGB BLD-MCNC: 17.8 G/DL (ref 14–18)
HGB BLD-MCNC: 18.2 G/DL (ref 14–18)
IMM GRANULOCYTES # BLD AUTO: 0.05 K/UL (ref 0–0.04)
IMM GRANULOCYTES # BLD AUTO: 0.06 K/UL (ref 0–0.04)
IMM GRANULOCYTES NFR BLD AUTO: 0.4 % (ref 0–0.5)
IMM GRANULOCYTES NFR BLD AUTO: 0.6 % (ref 0–0.5)
LYMPHOCYTES # BLD AUTO: 2.3 K/UL (ref 1–4.8)
LYMPHOCYTES # BLD AUTO: 2.6 K/UL (ref 1–4.8)
LYMPHOCYTES NFR BLD: 20.4 % (ref 18–48)
LYMPHOCYTES NFR BLD: 26 % (ref 18–48)
MAGNESIUM SERPL-MCNC: 2.2 MG/DL (ref 1.6–2.6)
MCH RBC QN AUTO: 30.9 PG (ref 27–31)
MCH RBC QN AUTO: 31.3 PG (ref 27–31)
MCHC RBC AUTO-ENTMCNC: 33.5 G/DL (ref 32–36)
MCHC RBC AUTO-ENTMCNC: 34 G/DL (ref 32–36)
MCV RBC AUTO: 92 FL (ref 82–98)
MCV RBC AUTO: 92 FL (ref 82–98)
MONOCYTES # BLD AUTO: 0.8 K/UL (ref 0.3–1)
MONOCYTES # BLD AUTO: 0.8 K/UL (ref 0.3–1)
MONOCYTES NFR BLD: 7 % (ref 4–15)
MONOCYTES NFR BLD: 7.9 % (ref 4–15)
NEUTROPHILS # BLD AUTO: 6.2 K/UL (ref 1.8–7.7)
NEUTROPHILS # BLD AUTO: 7.9 K/UL (ref 1.8–7.7)
NEUTROPHILS NFR BLD: 62.3 % (ref 38–73)
NEUTROPHILS NFR BLD: 69.8 % (ref 38–73)
NRBC BLD-RTO: 0 /100 WBC
NRBC BLD-RTO: 0 /100 WBC
PHOSPHATE SERPL-MCNC: 2.9 MG/DL (ref 2.7–4.5)
PLATELET # BLD AUTO: 229 K/UL (ref 150–450)
PLATELET # BLD AUTO: 249 K/UL (ref 150–450)
PMV BLD AUTO: 9.4 FL (ref 9.2–12.9)
PMV BLD AUTO: 9.6 FL (ref 9.2–12.9)
POC ACTIVATED CLOTTING TIME K: 144 SEC (ref 74–137)
POC ACTIVATED CLOTTING TIME K: 225 SEC (ref 74–137)
POTASSIUM SERPL-SCNC: 3.8 MMOL/L (ref 3.5–5.1)
POTASSIUM SERPL-SCNC: 4.2 MMOL/L (ref 3.5–5.1)
RBC # BLD AUTO: 5.76 M/UL (ref 4.6–6.2)
RBC # BLD AUTO: 5.81 M/UL (ref 4.6–6.2)
SAMPLE: ABNORMAL
SAMPLE: ABNORMAL
SODIUM SERPL-SCNC: 138 MMOL/L (ref 136–145)
SODIUM SERPL-SCNC: 138 MMOL/L (ref 136–145)
TROPONIN I SERPL DL<=0.01 NG/ML-MCNC: <0.03 NG/ML
WBC # BLD AUTO: 11.35 K/UL (ref 3.9–12.7)
WBC # BLD AUTO: 9.9 K/UL (ref 3.9–12.7)

## 2022-10-09 PROCEDURE — 99153 MOD SED SAME PHYS/QHP EA: CPT | Performed by: GENERAL PRACTICE

## 2022-10-09 PROCEDURE — 25000003 PHARM REV CODE 250: Performed by: HOSPITALIST

## 2022-10-09 PROCEDURE — 21400001 HC TELEMETRY ROOM

## 2022-10-09 PROCEDURE — 85730 THROMBOPLASTIN TIME PARTIAL: CPT | Mod: 91 | Performed by: HOSPITALIST

## 2022-10-09 PROCEDURE — 93010 EKG 12-LEAD: ICD-10-PCS | Mod: ,,, | Performed by: SPECIALIST

## 2022-10-09 PROCEDURE — 63600175 PHARM REV CODE 636 W HCPCS: Performed by: FAMILY MEDICINE

## 2022-10-09 PROCEDURE — 25500020 PHARM REV CODE 255: Performed by: GENERAL PRACTICE

## 2022-10-09 PROCEDURE — C1887 CATHETER, GUIDING: HCPCS | Performed by: GENERAL PRACTICE

## 2022-10-09 PROCEDURE — 92928 PR STENT: ICD-10-PCS | Mod: RC,51,59, | Performed by: GENERAL PRACTICE

## 2022-10-09 PROCEDURE — 93454 CORONARY ARTERY ANGIO S&I: CPT | Mod: 26,59,51, | Performed by: GENERAL PRACTICE

## 2022-10-09 PROCEDURE — C1760 CLOSURE DEV, VASC: HCPCS | Performed by: GENERAL PRACTICE

## 2022-10-09 PROCEDURE — 25000003 PHARM REV CODE 250: Performed by: FAMILY MEDICINE

## 2022-10-09 PROCEDURE — 85025 COMPLETE CBC W/AUTO DIFF WBC: CPT | Mod: 91 | Performed by: NURSE PRACTITIONER

## 2022-10-09 PROCEDURE — 80048 BASIC METABOLIC PNL TOTAL CA: CPT | Performed by: NURSE PRACTITIONER

## 2022-10-09 PROCEDURE — 27800903 OPTIME MED/SURG SUP & DEVICES OTHER IMPLANTS: Performed by: GENERAL PRACTICE

## 2022-10-09 PROCEDURE — 63600175 PHARM REV CODE 636 W HCPCS: Performed by: HOSPITALIST

## 2022-10-09 PROCEDURE — 85025 COMPLETE CBC W/AUTO DIFF WBC: CPT | Performed by: HOSPITALIST

## 2022-10-09 PROCEDURE — 99152 MOD SED SAME PHYS/QHP 5/>YRS: CPT | Mod: ,,, | Performed by: GENERAL PRACTICE

## 2022-10-09 PROCEDURE — 94761 N-INVAS EAR/PLS OXIMETRY MLT: CPT

## 2022-10-09 PROCEDURE — 92921 PR PTCA, ADD'L VESSEL: ICD-10-PCS | Mod: LD,,, | Performed by: GENERAL PRACTICE

## 2022-10-09 PROCEDURE — 36415 COLL VENOUS BLD VENIPUNCTURE: CPT | Performed by: HOSPITALIST

## 2022-10-09 PROCEDURE — 25000003 PHARM REV CODE 250: Performed by: NURSE PRACTITIONER

## 2022-10-09 PROCEDURE — C1894 INTRO/SHEATH, NON-LASER: HCPCS | Performed by: GENERAL PRACTICE

## 2022-10-09 PROCEDURE — 99152 MOD SED SAME PHYS/QHP 5/>YRS: CPT | Performed by: GENERAL PRACTICE

## 2022-10-09 PROCEDURE — 99499 NO LOS: ICD-10-PCS | Mod: ,,, | Performed by: GENERAL PRACTICE

## 2022-10-09 PROCEDURE — C1874 STENT, COATED/COV W/DEL SYS: HCPCS | Performed by: GENERAL PRACTICE

## 2022-10-09 PROCEDURE — 83735 ASSAY OF MAGNESIUM: CPT | Performed by: HOSPITALIST

## 2022-10-09 PROCEDURE — 93454 PR CATH PLACE/CORONARY ANGIO, IMG SUPER/INTERP: ICD-10-PCS | Mod: 26,59,51, | Performed by: GENERAL PRACTICE

## 2022-10-09 PROCEDURE — C9600 PERC DRUG-EL COR STENT SING: HCPCS | Mod: RC | Performed by: GENERAL PRACTICE

## 2022-10-09 PROCEDURE — 25000003 PHARM REV CODE 250: Performed by: GENERAL PRACTICE

## 2022-10-09 PROCEDURE — 92921 PR PTCA, ADD'L VESSEL: CPT | Mod: LD,,, | Performed by: GENERAL PRACTICE

## 2022-10-09 PROCEDURE — 93454 CORONARY ARTERY ANGIO S&I: CPT | Mod: XU | Performed by: GENERAL PRACTICE

## 2022-10-09 PROCEDURE — 84484 ASSAY OF TROPONIN QUANT: CPT | Performed by: HOSPITALIST

## 2022-10-09 PROCEDURE — C1769 GUIDE WIRE: HCPCS | Performed by: GENERAL PRACTICE

## 2022-10-09 PROCEDURE — 63600175 PHARM REV CODE 636 W HCPCS: Performed by: GENERAL PRACTICE

## 2022-10-09 PROCEDURE — 92928 PRQ TCAT PLMT NTRAC ST 1 LES: CPT | Mod: LD,,, | Performed by: GENERAL PRACTICE

## 2022-10-09 PROCEDURE — 80069 RENAL FUNCTION PANEL: CPT | Performed by: HOSPITALIST

## 2022-10-09 PROCEDURE — C1725 CATH, TRANSLUMIN NON-LASER: HCPCS | Performed by: GENERAL PRACTICE

## 2022-10-09 PROCEDURE — 99152 PR MOD CONSCIOUS SEDATION, SAME PHYS, 5+ YRS, FIRST 15 MIN: ICD-10-PCS | Mod: ,,, | Performed by: GENERAL PRACTICE

## 2022-10-09 PROCEDURE — 93005 ELECTROCARDIOGRAM TRACING: CPT | Performed by: SPECIALIST

## 2022-10-09 PROCEDURE — 99499 UNLISTED E&M SERVICE: CPT | Mod: ,,, | Performed by: GENERAL PRACTICE

## 2022-10-09 PROCEDURE — 93010 ELECTROCARDIOGRAM REPORT: CPT | Mod: ,,, | Performed by: SPECIALIST

## 2022-10-09 PROCEDURE — 93458 L HRT ARTERY/VENTRICLE ANGIO: CPT | Mod: XU | Performed by: GENERAL PRACTICE

## 2022-10-09 DEVICE — STENT RONYX30022UX RESOLUTE ONYX 3.00X22
Type: IMPLANTABLE DEVICE | Site: CORONARY | Status: FUNCTIONAL
Brand: RESOLUTE ONYX™

## 2022-10-09 DEVICE — STENT RONYX40018UX RESOLUTE ONYX 4.00X18
Type: IMPLANTABLE DEVICE | Site: CORONARY | Status: FUNCTIONAL
Brand: RESOLUTE ONYX™

## 2022-10-09 DEVICE — ANGIO-SEAL VIP VASCULAR CLOSURE DEVICE
Type: IMPLANTABLE DEVICE | Site: GROIN | Status: FUNCTIONAL
Brand: ANGIO-SEAL

## 2022-10-09 RX ORDER — HYDROCODONE BITARTRATE AND ACETAMINOPHEN 5; 325 MG/1; MG/1
1 TABLET ORAL EVERY 6 HOURS PRN
Status: DISCONTINUED | OUTPATIENT
Start: 2022-10-09 | End: 2022-10-10 | Stop reason: HOSPADM

## 2022-10-09 RX ORDER — SODIUM CHLORIDE 9 MG/ML
INJECTION, SOLUTION INTRAVENOUS ONCE
Status: DISCONTINUED | OUTPATIENT
Start: 2022-10-09 | End: 2022-10-09 | Stop reason: HOSPADM

## 2022-10-09 RX ORDER — MIDAZOLAM HYDROCHLORIDE 1 MG/ML
INJECTION INTRAMUSCULAR; INTRAVENOUS
Status: DISCONTINUED | OUTPATIENT
Start: 2022-10-09 | End: 2022-10-09 | Stop reason: HOSPADM

## 2022-10-09 RX ORDER — ACETAMINOPHEN 325 MG/1
650 TABLET ORAL EVERY 4 HOURS PRN
Status: DISCONTINUED | OUTPATIENT
Start: 2022-10-09 | End: 2022-10-10 | Stop reason: HOSPADM

## 2022-10-09 RX ORDER — SODIUM CHLORIDE 9 MG/ML
INJECTION, SOLUTION INTRAVENOUS CONTINUOUS
Status: DISCONTINUED | OUTPATIENT
Start: 2022-10-09 | End: 2022-10-09

## 2022-10-09 RX ORDER — DIPHENHYDRAMINE HCL 25 MG
50 CAPSULE ORAL
Status: DISCONTINUED | OUTPATIENT
Start: 2022-10-09 | End: 2022-10-09 | Stop reason: HOSPADM

## 2022-10-09 RX ORDER — CLOPIDOGREL BISULFATE 75 MG/1
TABLET ORAL
Status: DISCONTINUED | OUTPATIENT
Start: 2022-10-09 | End: 2022-10-09 | Stop reason: HOSPADM

## 2022-10-09 RX ORDER — ASPIRIN 81 MG/1
81 TABLET ORAL DAILY
Status: DISCONTINUED | OUTPATIENT
Start: 2022-10-10 | End: 2022-10-10 | Stop reason: HOSPADM

## 2022-10-09 RX ORDER — CLOPIDOGREL BISULFATE 75 MG/1
75 TABLET ORAL DAILY
Status: DISCONTINUED | OUTPATIENT
Start: 2022-10-10 | End: 2022-10-10 | Stop reason: HOSPADM

## 2022-10-09 RX ORDER — LIDOCAINE HYDROCHLORIDE 10 MG/ML
INJECTION, SOLUTION EPIDURAL; INFILTRATION; INTRACAUDAL; PERINEURAL
Status: DISCONTINUED | OUTPATIENT
Start: 2022-10-09 | End: 2022-10-09 | Stop reason: HOSPADM

## 2022-10-09 RX ORDER — NITROGLYCERIN 5 MG/ML
INJECTION, SOLUTION INTRAVENOUS
Status: DISCONTINUED | OUTPATIENT
Start: 2022-10-09 | End: 2022-10-09 | Stop reason: HOSPADM

## 2022-10-09 RX ORDER — SODIUM CHLORIDE 450 MG/100ML
100 INJECTION, SOLUTION INTRAVENOUS CONTINUOUS
Status: DISCONTINUED | OUTPATIENT
Start: 2022-10-09 | End: 2022-10-10 | Stop reason: HOSPADM

## 2022-10-09 RX ORDER — LOSARTAN POTASSIUM 50 MG/1
100 TABLET ORAL DAILY
Status: DISCONTINUED | OUTPATIENT
Start: 2022-10-10 | End: 2022-10-10 | Stop reason: HOSPADM

## 2022-10-09 RX ORDER — HEPARIN SODIUM 1000 [USP'U]/ML
INJECTION, SOLUTION INTRAVENOUS; SUBCUTANEOUS
Status: DISCONTINUED | OUTPATIENT
Start: 2022-10-09 | End: 2022-10-09 | Stop reason: HOSPADM

## 2022-10-09 RX ORDER — FENTANYL CITRATE 50 UG/ML
INJECTION, SOLUTION INTRAMUSCULAR; INTRAVENOUS
Status: DISCONTINUED | OUTPATIENT
Start: 2022-10-09 | End: 2022-10-09 | Stop reason: HOSPADM

## 2022-10-09 RX ADMIN — BUPROPION HYDROCHLORIDE 150 MG: 150 TABLET, EXTENDED RELEASE ORAL at 09:10

## 2022-10-09 RX ADMIN — METOPROLOL SUCCINATE 75 MG: 50 TABLET, FILM COATED, EXTENDED RELEASE ORAL at 08:10

## 2022-10-09 RX ADMIN — AMLODIPINE BESYLATE 5 MG: 5 TABLET ORAL at 01:10

## 2022-10-09 RX ADMIN — HEPARIN SODIUM 16.97 UNITS/KG/HR: 10000 INJECTION, SOLUTION INTRAVENOUS at 04:10

## 2022-10-09 RX ADMIN — HEPARIN SODIUM 14.97 UNITS/KG/HR: 10000 INJECTION, SOLUTION INTRAVENOUS at 04:10

## 2022-10-09 RX ADMIN — SODIUM CHLORIDE: 0.9 INJECTION, SOLUTION INTRAVENOUS at 08:10

## 2022-10-09 RX ADMIN — AMITRIPTYLINE HYDROCHLORIDE 25 MG: 25 TABLET, FILM COATED ORAL at 09:10

## 2022-10-09 RX ADMIN — MORPHINE SULFATE 4 MG: 4 INJECTION, SOLUTION INTRAMUSCULAR; INTRAVENOUS at 10:10

## 2022-10-09 RX ADMIN — NITROGLYCERIN 0.4 MG: 0.4 TABLET SUBLINGUAL at 10:10

## 2022-10-09 RX ADMIN — MORPHINE SULFATE 4 MG: 4 INJECTION, SOLUTION INTRAMUSCULAR; INTRAVENOUS at 04:10

## 2022-10-09 RX ADMIN — HYDROCODONE BITARTRATE AND ACETAMINOPHEN 1 TABLET: 5; 325 TABLET ORAL at 04:10

## 2022-10-09 RX ADMIN — ATORVASTATIN CALCIUM 40 MG: 40 TABLET, FILM COATED ORAL at 08:10

## 2022-10-09 RX ADMIN — ISOSORBIDE MONONITRATE 30 MG: 30 TABLET, EXTENDED RELEASE ORAL at 01:10

## 2022-10-09 RX ADMIN — BUPROPION HYDROCHLORIDE 150 MG: 150 TABLET, EXTENDED RELEASE ORAL at 08:10

## 2022-10-09 RX ADMIN — SODIUM CHLORIDE 100 ML/HR: 0.45 INJECTION, SOLUTION INTRAVENOUS at 03:10

## 2022-10-09 NOTE — NURSING
Report given to TOBI Cabrera. Pt transferred to room 2502. Transported with pt chart and belongings. Wife @ bedside too. Groin site remains CDI. Safety checks done. Will cont to monitor.

## 2022-10-09 NOTE — NURSING
Pt back from cath lab. R fem puncture site CDI. Pt awake and asking for food. Heparin gtt stopped per Dr. Arthur. VSS and EKG completed. Will cont to monitor.

## 2022-10-09 NOTE — PROGRESS NOTES
[facility]  Department of Cardiology  Consult Note      PATIENT NAME: Irving Tanner  MRN: [id number]  TODAY'S DATE: 10/09/2022  ADMIT DATE: 10/7/2022                          CONSULT REQUESTED BY: Tessy Pascual MD    SUBJECTIVE     PRINCIPAL PROBLEM: Unstable angina      REASON FOR CONSULT:  Chest Pain      10/9/2022  Tolerated procedure well see full note s/p pci lad and rca    HPI:    37 Year old male patient admitted with chest pain. He has a PMH significant for Tobacco abuse, HTN and Dyslpidemia. He saw a cardiologist last week Dr. Garcia but there I no one currently covering for that group in [place] therefore I saw patient and did stress test as ok with patient and Dr. Rich. Patient denies chest pain currently. No arm neck or jaw pain currently. Patient states it comes and goes can be at rest or with exertion and feels as if someone is squeezing his heart. It did go away with nitropaste. EKG is abnormal. Shows st -t abnormality anterior ischemia. Troponin is negative. .    FROM H AND P     Irving Tanner is a 37 y.o. White male   With PMH of HTN, HPL, trigeminy, tobacco use  who presents with several week history of intermittent chest pain that is nonexertional.  Pain is pressure/aching with radiation into left arm.  Some diaphoresis. No palpitations. Mild SOB associated with CP.  Nonexertional. 5/10.       Sees cardiology and was seen yesterday.  Directed to present to ED today for CP/R/O.  ED evaluation significant for CBC, BMP, troponins normal.       EKG with T wave abnormality in anterior leads.     CP improved with nitro paste.Irving Tanner is a 37 y.o. White male   With PMH of HTN, HPL, trigeminy, tobacco use  who presents with several week history of intermittent chest pain that is nonexertional.  Pain is pressure/aching with radiation into left arm.  Some diaphoresis. No palpitations. Mild SOB associated with CP.  Nonexertional. 5/10.       Sees cardiology and was  seen yesterday.  Directed to present to ED today for CP/R/O.  ED evaluation significant for CBC, BMP, troponins normal.       EKG with T wave abnormality in anterior leads.     CP improved with nitro paste.        Review of patient's allergies indicates:   Allergen Reactions    Motrin [ibuprofen]      swelling       Past Medical History:   Diagnosis Date    Hyperlipidemia     Hypertension      Past Surgical History:   Procedure Laterality Date    APPENDECTOMY      FOOT FRACTURE SURGERY      Incision and Drainage of Perirectal Abscess  06/22/2018         Social History     Tobacco Use    Smoking status: Every Day     Packs/day: 1.00     Years: 18.00     Pack years: 18.00     Types: Cigarettes    Smokeless tobacco: Never   Substance Use Topics    Alcohol use: No     Comment: occasionally    Drug use: No        REVIEW OF SYSTEMS  CONSTITUTIONAL: Negative for chills, fatigue and fever.   EYES: No double vision, No blurred vision  NEURO: No headaches, No dizziness  RESPIRATORY: Negative for cough, shortness of breath and wheezing.    CARDIOVASCULAR: +cp  GI: Negative for abdominal pain, No melena, diarrhea, nausea and vomiting.   : Negative for dysuria and frequency, Negative for hematuria  SKIN: Negative for bruising, Negative for edema or discoloration noted.   ENDOCRINE: Negative for polyphagia, Negative for heat intolerance, Negative for cold intolerance  PSYCHIATRIC: Negative for depression, Negative for anxiety, Negative for memory loss  MUSCULOSKELETAL: Negative for neck pain, Negative for muscle weakness, Negative for back pain     OBJECTIVE     VITAL SIGNS (Most Recent)  Temp: 98 °F (36.7 °C) (10/09/22 1245)  Pulse: 77 (10/09/22 1400)  Resp: 17 (10/09/22 1400)  BP: (!) 155/70 (10/09/22 1400)  SpO2: 95 % (10/09/22 1400)    VENTILATION STATUS  Resp: 17 (10/09/22 1400)  SpO2: 95 % (10/09/22 1400)       I & O (Last 24H):  Intake/Output Summary (Last 24 hours) at 10/9/2022 1411  Last data filed at  10/9/2022 1330  Gross per 24 hour   Intake 1703.18 ml   Output 1725 ml   Net -21.82 ml       WEIGHTS  Wt Readings from Last 3 Encounters:   10/08/22 0039 90.1 kg (198 lb 9.6 oz)   10/07/22 1944 90.1 kg (198 lb 9.6 oz)   10/07/22 1033 90.7 kg (200 lb)   10/08/22 1111 90.1 kg (198 lb 10.2 oz)   09/07/22 1358 90.4 kg (199 lb 3.2 oz)       PHYSICAL EXAM  GENERAL: well built, well nourished, well-developed in no apparent distress alert and oriented.   HEENT: Normocephalic. Pupils normal and conjunctivae normal.  Mucous membranes normal, no cyanosis or icterus, trachea central,no pallor or icterus is noted..   NECK: No JVD. No bruit..   THYROID: Thyroid not enlarged. No nodules present..   CARDIAC: Regular rate and rhythm. S1 is normal.S2 is normal.No gallops, clicks or murmurs noted at this time.  CHEST ANATOMY: normal.   LUNGS: Clear to auscultation. No wheezing or rhonchi..   ABDOMEN: Soft no masses or organomegaly.  No abdomen pulsations or bruits.  Normal bowel sounds. No pulsations and no masses felt, No guarding or rebound.   URINARY: No de la paz catheter   EXTREMITIES: No cyanosis, clubbing or edema noted at this time., no calf tenderness bilaterally.   PERIPHERAL VASCULAR SYSTEM: Good palpable distal pulses.   CENTRAL NERVOUS SYSTEM: No focal motor or sensory deficits noted.   SKIN: Skin without lesions, moist, well perfused.   MUSCLE STRENGTH & TONE: No noteable weakness, atrophy or abnormal movement.     HOME MEDICATIONS:  No current facility-administered medications on file prior to encounter.     Current Outpatient Medications on File Prior to Encounter   Medication Sig Dispense Refill    amitriptyline (ELAVIL) 25 MG tablet Take 1 tablet (25 mg total) by mouth nightly as needed for Insomnia. 90 tablet 1    amlodipine-olmesartan (JENY) 5-40 mg per tablet Take 1 tablet by mouth once daily. 90 tablet 1    buPROPion (WELLBUTRIN SR) 150 MG TBSR 12 hr tablet Take 1 tablet (150 mg total) by mouth 2 (two) times daily.  180 tablet 2    metoprolol succinate (TOPROL-XL) 50 MG 24 hr tablet Take 1 tablet (50 mg total) by mouth every evening. 90 tablet 1    rosuvastatin (CRESTOR) 10 MG tablet Take 1 tablet (10 mg total) by mouth every evening. 90 tablet 1       SCHEDULED MEDS:   amLODIPine  5 mg Oral Daily    [START ON 10/10/2022] aspirin  81 mg Oral Daily    atorvastatin  40 mg Oral QHS    buPROPion  150 mg Oral BID    [START ON 10/10/2022] clopidogreL  75 mg Oral Daily    isosorbide mononitrate  30 mg Oral Daily    [START ON 10/10/2022] losartan  100 mg Oral Daily    metoprolol succinate  50 mg Oral QHS       CONTINUOUS INFUSIONS:   sodium chloride 0.45%         PRN MEDS:acetaminophen, acetaminophen, amitriptyline, magnesium oxide, magnesium oxide, morphine, morphine, nitroGLYCERIN, ondansetron, potassium bicarbonate, potassium bicarbonate, potassium bicarbonate, potassium, sodium phosphates, potassium, sodium phosphates, potassium, sodium phosphates, prochlorperazine, senna-docusate 8.6-50 mg, sodium chloride 0.9%    LABS AND DIAGNOSTICS     CBC LAST 3 DAYS  Recent Labs   Lab 10/08/22  0056 10/09/22  0319 10/09/22  0916   WBC 11.74 9.90 11.35   RBC 5.63 5.76 5.81   HGB 17.6 17.8 18.2*   HCT 52.4 53.1 53.6   MCV 93 92 92   MCH 31.3* 30.9 31.3*   MCHC 33.6 33.5 34.0   RDW 14.9* 14.4 14.5    229 249   MPV 10.0 9.4 9.6   GRAN 65.0  7.6 62.3  6.2 69.8  7.9*   LYMPH 23.0  2.7 26.0  2.6 20.4  2.3   MONO 8.4  1.0 7.9  0.8 7.0  0.8   BASO 0.07 0.08 0.06   NRBC 0 0 0       COAGULATION LAST 3 DAYS  Recent Labs   Lab 10/07/22  1310 10/08/22  1402 10/08/22  2100 10/09/22  0319 10/09/22  0916   LABPT 12.8 13.2  --   --   --    INR 1.0 1.1  --   --   --    APTT 27.3 27.7 33.7 58.3* 58.5*       CHEMISTRY LAST 3 DAYS  Recent Labs   Lab 10/07/22  1310 10/08/22  0056 10/09/22  0318 10/09/22  0916    136 138 138   K 3.9 3.9 3.8 4.2    105 105 104   CO2 26 27 25 24   ANIONGAP 8 4* 8 10   BUN 11 11 11 9   CREATININE 1.1 0.8 0.9  0.7   GLU 93 98 95 93   CALCIUM 8.8 8.6* 8.4* 8.7   MG 1.8 2.0 2.2  --    ALBUMIN 4.3  --  3.8  --    PROT 7.5  --   --   --    ALKPHOS 51*  --   --   --    ALT 30  --   --   --    AST 24  --   --   --    BILITOT 0.8  --   --   --        CARDIAC PROFILE LAST 3 DAYS  Recent Labs   Lab 10/08/22  0056 10/08/22  1402 10/09/22  0318   TROPONINI <0.030 <0.030 <0.030       ENDOCRINE LAST 3 DAYS  No results for input(s): TSH, PROCAL in the last 168 hours.    LAST ARTERIAL BLOOD GAS  ABG  No results for input(s): PH, PO2, PCO2, HCO3, BE in the last 168 hours.    LAST 7 DAYS MICROBIOLOGY   Microbiology Results (last 7 days)       ** No results found for the last 168 hours. **            MOST RECENT IMAGING  Cardiac catheterization    The 2nd Diag lesion was 85% stenosed with 0% stenosis   post-intervention.    The Prox LAD lesion was 99% stenosed with 0% stenosis   post-intervention.    The Dist RCA lesion was 99% stenosed with 0% stenosis   post-intervention.    A STENT JIGNESH MI 4.0 X 18 stent was successfully placed.    A STENT JIGNESH MI 3.0 X 22 stent was successfully placed at 12 LEXI for   22 sec.    The estimated blood loss was none.    There was two vessel coronary artery disease.    The PCI was successful.    LV GRAM was not done    The procedure log was documented by Documenter: RT Quique and   verified by Jose Rafael Hernandez MD.    Date: 10/9/2022  Time: 12:26 PM      ECHOCARDIOGRAM RESULTS (last 5)  No results found for this or any previous visit.      CURRENT/PREVIOUS VISIT EKG  Results for orders placed or performed during the hospital encounter of 10/07/22   EKG 12-LEAD on arrival to floor    Collection Time: 10/09/22 12:58 PM    Narrative    Test Reason : I25.10,    Vent. Rate : 078 BPM     Atrial Rate : 078 BPM     P-R Int : 144 ms          QRS Dur : 094 ms      QT Int : 372 ms       P-R-T Axes : 035 026 010 degrees     QTc Int : 424 ms    Sinus rhythm with frequent Premature ventricular  complexes  Otherwise normal ECG  When compared with ECG of 08-OCT-2022 13:58,  Premature ventricular complexes are now Present  T wave inversion no longer evident in Lateral leads    Referred By: [email or web address]   SELF           Confirmed By:            ASSESSMENT/PLAN:     Active Hospital Problems    Diagnosis    *Unstable angina    Chest pain    Cigarette nicotine dependence without complication    Mixed hyperlipidemia    Ventricular trigeminy    Essential hypertension       ASSESSMENT & PLAN:     Chest Pains/p PCI LAD and RCA  HTN  Dyslipidemia  Smoker  Abnormal EKG  Trigemeny        RECOMMENDATIONS:    Patient can go to cardiology floor  Metoprolol 50 mg daily  Losartan 100 mg daily  ASA /PLAVIX  Statin  Possible DC tomorrow with Holter    Patient underwent successful 2 vessel coronary angioplasty without complication.  Echo shows EF 55% mild concentric hypertrophy  He still has some frequent PVCs but diminished since the 2 vessel stenting in the right coronary artery and LAD  Increase beta-blocker.  Decrease losartan if necessary  Recommend to discharge on Holter monitor as an outpatient  Tobacco cessation discussed with the patient     Ann Arthur NP  [facility]  Department of Cardiology  Date of Service: 10/09/2022            Jose Rafael Hernandez M.D.  [facility]  Department of Cardiology  Date of Service: 10/09/2022  9:56 AM

## 2022-10-09 NOTE — NURSING
Pt off floor to cath lab. Wife in waiting room. Consent sent with anesthesia. Will cont to monitor.

## 2022-10-09 NOTE — PLAN OF CARE
"          Dx: Unstable angina    Shift Events: Pt had an uneventful night outside of occasional CP which were addressed as ordered. At one point, pt admitted to having CP @ 8/10; states he didn't call hoping pain would go away on its own. Pt reminded that his CP needs to be addressed. Sites clipped. CHG bath completed as assisted by spouse.     Goals of Care: Maintain comfort level and manage pain    Neuro: AAO x4    Vital Signs: /67   Pulse (!) 53   Temp 98.6 °F (37 °C) (Oral)   Resp (!) 23   Ht 5' 10" (1.778 m)   Wt 90.1 kg (198 lb 9.6 oz)   SpO2 (!) 94%   BMI 28.50 kg/m²     Respiratory: Room Air    Diet: NPO    Gtts: Heparin    Urine Output: Voids Spontaneously 750 cc/shift      Labs:  aPTT=58.3, Trop= <0.030    Skin: Intact    Problem: Adult Inpatient Plan of Care  Goal: Optimal Comfort and Wellbeing  10/9/2022 0723 by Kenneth Giron RN  Outcome: Ongoing, Progressing  10/9/2022 0723 by Kenneth Giron RN  Outcome: Ongoing, Progressing     Problem: Chest Pain  Goal: Resolution of Chest Pain Symptoms  10/9/2022 0723 by Kenneth Giron RN  Outcome: Ongoing, Progressing  10/9/2022 0723 by Kenneth Giron RN  Outcome: Ongoing, Progressing     "

## 2022-10-09 NOTE — PROGRESS NOTES
[facility] Medicine  Progress Note    Patient name: Irving Tanner  MRN: [id number]  Admit Date: 10/7/2022   LOS: 1 day     SUBJECTIVE:     Principal problem: Unstable angina    Interval History:  Patient was seen and examined at bedside, he remains on heparin drip, last night had couple episodes of chest pain requiring morphine and nitroglycerin.  Going for left heart catheterization later in the day    Scheduled Meds:   amLODIPine  5 mg Oral Daily    aspirin  325 mg Oral Daily    atorvastatin  40 mg Oral QHS    buPROPion  150 mg Oral BID    isosorbide mononitrate  30 mg Oral Daily    losartan  100 mg Oral Daily    metoprolol succinate  50 mg Oral QHS     Continuous Infusions:   heparin (porcine) in D5W 16.97 Units/kg/hr (10/09/22 0405)     PRN Meds:acetaminophen, amitriptyline, heparin (PORCINE), heparin (PORCINE), magnesium oxide, magnesium oxide, morphine, morphine, nitroGLYCERIN, ondansetron, potassium bicarbonate, potassium bicarbonate, potassium bicarbonate, potassium, sodium phosphates, potassium, sodium phosphates, potassium, sodium phosphates, prochlorperazine, senna-docusate 8.6-50 mg, sodium chloride 0.9%    Review of patient's allergies indicates:   Allergen Reactions    Motrin [ibuprofen]      swelling       Review of Systems: As per interval history    OBJECTIVE:     Vital Signs (Most Recent)  Temp: 98.6 °F (37 °C) (10/09/22 0315)  Pulse: 60 (10/09/22 0730)  Resp: 19 (10/09/22 0730)  BP: 117/80 (10/09/22 0730)  SpO2: (!) 93 % (10/09/22 0730)    Vital Signs Range (Last 24H):  Temp:  [98.1 °F (36.7 °C)-98.6 °F (37 °C)]   Pulse:  [48-79]   Resp:  [18-32]   BP: (115-161)/(56-85)   SpO2:  [92 %-96 %]     I & O (Last 24H):  Intake/Output Summary (Last 24 hours) at 10/9/2022 0741  Last data filed at 10/9/2022 0600  Gross per 24 hour   Intake 1414.84 ml   Output 1025 ml   Net 389.84 ml         Physical Exam:  General:  Mild distress due to pain  Eyes: No conjunctival injection. No  scleral icterus.  ENT: Hearing grossly intact. No discharge from ears. No nasal discharge.   Neck: Supple, trachea midline. No JVD  CVS: RRR. No LE edema BL  Lungs:  No tachypnea or accessory muscle use.  Clear to auscultation bilaterally  Abdomen:  Soft, nontender and nondistended.  No organomegaly  Neuro: AOx3. Moves all extremities. Follows commands. Responds appropriately   Skin:  No rash or erythema noted    Laboratory:  I have reviewed all pertinent lab results within the past 24 hours.    Diagnostic Results:  Reviewed all imaging    ASSESSMENT/PLAN:     37-year-old with heavy smoking dependence, hypertension, history of ventricular trigeminy came with worsening angina found to have positive stress test.     Active Hospital Problems    Diagnosis  POA    *Unstable angina [I20.0]  Yes    Chest pain [R07.9]  Unknown    Cigarette nicotine dependence without complication [F17.210]  Yes    Mixed hyperlipidemia [E78.2]  Yes    Ventricular trigeminy [I49.8]  Yes    Essential hypertension [I10]  Yes      Resolved Hospital Problems   No resolved problems to display.         Plan:   Being treated for unstable angina, on heparin drip, then to cath lab and I was told that he received stent in LAD  Still has some trigeminy on monitor  Continue post catheterization protocol  Aspirin, Plavix, beta-blocker, losartan  P.r.n. nitroglycerin  P.r.n. morphine    VTE Risk Mitigation (From admission, onward)           Ordered     heparin 25,000 units in dextrose 5% (100 units/ml) IV bolus from bag - ADDITIONAL PRN BOLUS - 60 units/kg (max bolus 4000 units)  As needed (PRN)        Question:  Heparin Infusion Adjustment (DO NOT MODIFY ANSWER)  Answer:  [email or web address]\[email or web address]\Images\Pharmacy\HeparinInfusions\heparin LOW INTENSITY nomogram for [facility] [id number].pdf    10/08/22 1349     heparin 25,000 units in dextrose 5% (100 units/ml) IV bolus from bag - ADDITIONAL PRN BOLUS - 30 units/kg (max bolus 4000 units)  As needed (PRN)        Question:   Heparin Infusion Adjustment (DO NOT MODIFY ANSWER)  Answer:  [email or web address]\[email or web address]\Images\Pharmacy\HeparinInfusions\heparin LOW INTENSITY nomogram for [facility] [id number].pdf    10/08/22 1349     heparin 25,000 units in dextrose 5% 250 mL (100 units/mL) infusion LOW INTENSITY nomogram - [facility]  Continuous        Question Answer Comment   Heparin Infusion Adjustment (DO NOT MODIFY ANSWER) \\[email or web address]\[email or web address]\Images\Pharmacy\HeparinInfusions\heparin LOW INTENSITY nomogram for [facility] [id number].pdf    Begin at (in units/kg/hr) 12        10/08/22 1349     IP VTE LOW RISK PATIENT  Once         10/07/22 1902     Place sequential compression device  Until discontinued         10/07/22 1902                        Department Hospital Medicine  [facility]  Tessy Pascual MD  Date of service: 10/09/2022

## 2022-10-10 VITALS
RESPIRATION RATE: 18 BRPM | DIASTOLIC BLOOD PRESSURE: 71 MMHG | OXYGEN SATURATION: 95 % | BODY MASS INDEX: 28.44 KG/M2 | HEIGHT: 70 IN | WEIGHT: 198.63 LBS | TEMPERATURE: 98 F | HEART RATE: 64 BPM | SYSTOLIC BLOOD PRESSURE: 140 MMHG

## 2022-10-10 DIAGNOSIS — I49.8 VENTRICULAR TRIGEMINY: Primary | ICD-10-CM

## 2022-10-10 PROBLEM — I20.0 UNSTABLE ANGINA: Status: RESOLVED | Noted: 2022-10-08 | Resolved: 2022-10-10

## 2022-10-10 PROBLEM — R07.9 CHEST PAIN: Status: RESOLVED | Noted: 2022-10-07 | Resolved: 2022-10-10

## 2022-10-10 PROBLEM — I25.110 CORONARY ARTERY DISEASE INVOLVING NATIVE CORONARY ARTERY OF NATIVE HEART WITH UNSTABLE ANGINA PECTORIS: Status: ACTIVE | Noted: 2022-10-10

## 2022-10-10 LAB
ALBUMIN SERPL BCP-MCNC: 3.9 G/DL (ref 3.5–5.2)
ANION GAP SERPL CALC-SCNC: 9 MMOL/L (ref 8–16)
ANION GAP SERPL CALC-SCNC: 9 MMOL/L (ref 8–16)
BASOPHILS # BLD AUTO: 0.07 K/UL (ref 0–0.2)
BASOPHILS # BLD AUTO: 0.07 K/UL (ref 0–0.2)
BASOPHILS NFR BLD: 0.6 % (ref 0–1.9)
BASOPHILS NFR BLD: 0.6 % (ref 0–1.9)
BUN SERPL-MCNC: 9 MG/DL (ref 6–20)
BUN SERPL-MCNC: 9 MG/DL (ref 6–20)
CALCIUM SERPL-MCNC: 8.6 MG/DL (ref 8.7–10.5)
CALCIUM SERPL-MCNC: 8.6 MG/DL (ref 8.7–10.5)
CHLORIDE SERPL-SCNC: 101 MMOL/L (ref 95–110)
CHLORIDE SERPL-SCNC: 101 MMOL/L (ref 95–110)
CO2 SERPL-SCNC: 27 MMOL/L (ref 23–29)
CO2 SERPL-SCNC: 27 MMOL/L (ref 23–29)
CREAT SERPL-MCNC: 0.8 MG/DL (ref 0.5–1.4)
CREAT SERPL-MCNC: 0.8 MG/DL (ref 0.5–1.4)
DIFFERENTIAL METHOD: ABNORMAL
DIFFERENTIAL METHOD: ABNORMAL
EOSINOPHIL # BLD AUTO: 0.2 K/UL (ref 0–0.5)
EOSINOPHIL # BLD AUTO: 0.2 K/UL (ref 0–0.5)
EOSINOPHIL NFR BLD: 1.7 % (ref 0–8)
EOSINOPHIL NFR BLD: 1.7 % (ref 0–8)
ERYTHROCYTE [DISTWIDTH] IN BLOOD BY AUTOMATED COUNT: 14.3 % (ref 11.5–14.5)
ERYTHROCYTE [DISTWIDTH] IN BLOOD BY AUTOMATED COUNT: 14.3 % (ref 11.5–14.5)
EST. GFR  (NO RACE VARIABLE): >60 ML/MIN/1.73 M^2
EST. GFR  (NO RACE VARIABLE): >60 ML/MIN/1.73 M^2
GLUCOSE SERPL-MCNC: 89 MG/DL (ref 70–110)
GLUCOSE SERPL-MCNC: 89 MG/DL (ref 70–110)
HCT VFR BLD AUTO: 52.6 % (ref 40–54)
HCT VFR BLD AUTO: 52.6 % (ref 40–54)
HGB BLD-MCNC: 17.8 G/DL (ref 14–18)
HGB BLD-MCNC: 17.8 G/DL (ref 14–18)
IMM GRANULOCYTES # BLD AUTO: 0.05 K/UL (ref 0–0.04)
IMM GRANULOCYTES # BLD AUTO: 0.05 K/UL (ref 0–0.04)
IMM GRANULOCYTES NFR BLD AUTO: 0.5 % (ref 0–0.5)
IMM GRANULOCYTES NFR BLD AUTO: 0.5 % (ref 0–0.5)
LYMPHOCYTES # BLD AUTO: 1.9 K/UL (ref 1–4.8)
LYMPHOCYTES # BLD AUTO: 1.9 K/UL (ref 1–4.8)
LYMPHOCYTES NFR BLD: 17.6 % (ref 18–48)
LYMPHOCYTES NFR BLD: 17.6 % (ref 18–48)
MAGNESIUM SERPL-MCNC: 2 MG/DL (ref 1.6–2.6)
MCH RBC QN AUTO: 31.7 PG (ref 27–31)
MCH RBC QN AUTO: 31.7 PG (ref 27–31)
MCHC RBC AUTO-ENTMCNC: 33.8 G/DL (ref 32–36)
MCHC RBC AUTO-ENTMCNC: 33.8 G/DL (ref 32–36)
MCV RBC AUTO: 94 FL (ref 82–98)
MCV RBC AUTO: 94 FL (ref 82–98)
MONOCYTES # BLD AUTO: 0.9 K/UL (ref 0.3–1)
MONOCYTES # BLD AUTO: 0.9 K/UL (ref 0.3–1)
MONOCYTES NFR BLD: 8.1 % (ref 4–15)
MONOCYTES NFR BLD: 8.1 % (ref 4–15)
NEUTROPHILS # BLD AUTO: 7.9 K/UL (ref 1.8–7.7)
NEUTROPHILS # BLD AUTO: 7.9 K/UL (ref 1.8–7.7)
NEUTROPHILS NFR BLD: 71.5 % (ref 38–73)
NEUTROPHILS NFR BLD: 71.5 % (ref 38–73)
NRBC BLD-RTO: 0 /100 WBC
NRBC BLD-RTO: 0 /100 WBC
PHOSPHATE SERPL-MCNC: 2.8 MG/DL (ref 2.7–4.5)
PLATELET # BLD AUTO: 242 K/UL (ref 150–450)
PLATELET # BLD AUTO: 242 K/UL (ref 150–450)
PMV BLD AUTO: 9.6 FL (ref 9.2–12.9)
PMV BLD AUTO: 9.6 FL (ref 9.2–12.9)
POTASSIUM SERPL-SCNC: 3.8 MMOL/L (ref 3.5–5.1)
POTASSIUM SERPL-SCNC: 3.8 MMOL/L (ref 3.5–5.1)
RBC # BLD AUTO: 5.62 M/UL (ref 4.6–6.2)
RBC # BLD AUTO: 5.62 M/UL (ref 4.6–6.2)
SODIUM SERPL-SCNC: 137 MMOL/L (ref 136–145)
SODIUM SERPL-SCNC: 137 MMOL/L (ref 136–145)
TROPONIN I SERPL DL<=0.01 NG/ML-MCNC: 0.1 NG/ML
WBC # BLD AUTO: 11.05 K/UL (ref 3.9–12.7)
WBC # BLD AUTO: 11.05 K/UL (ref 3.9–12.7)

## 2022-10-10 PROCEDURE — 93010 ELECTROCARDIOGRAM REPORT: CPT | Mod: ,,, | Performed by: SPECIALIST

## 2022-10-10 PROCEDURE — 25000003 PHARM REV CODE 250: Performed by: HOSPITALIST

## 2022-10-10 PROCEDURE — 25000003 PHARM REV CODE 250: Performed by: FAMILY MEDICINE

## 2022-10-10 PROCEDURE — 25000003 PHARM REV CODE 250: Performed by: NURSE PRACTITIONER

## 2022-10-10 PROCEDURE — 93005 ELECTROCARDIOGRAM TRACING: CPT | Performed by: SPECIALIST

## 2022-10-10 PROCEDURE — 36415 COLL VENOUS BLD VENIPUNCTURE: CPT | Performed by: HOSPITALIST

## 2022-10-10 PROCEDURE — 93010 EKG 12-LEAD: ICD-10-PCS | Mod: ,,, | Performed by: SPECIALIST

## 2022-10-10 PROCEDURE — 80069 RENAL FUNCTION PANEL: CPT | Performed by: HOSPITALIST

## 2022-10-10 PROCEDURE — 85025 COMPLETE CBC W/AUTO DIFF WBC: CPT | Performed by: HOSPITALIST

## 2022-10-10 PROCEDURE — 84484 ASSAY OF TROPONIN QUANT: CPT | Performed by: NURSE PRACTITIONER

## 2022-10-10 PROCEDURE — 83735 ASSAY OF MAGNESIUM: CPT | Performed by: HOSPITALIST

## 2022-10-10 RX ORDER — ROSUVASTATIN CALCIUM 20 MG/1
20 TABLET, COATED ORAL NIGHTLY
Qty: 90 TABLET | Refills: 0 | Status: SHIPPED | OUTPATIENT
Start: 2022-10-10 | End: 2022-12-15 | Stop reason: SDUPTHER

## 2022-10-10 RX ORDER — ISOSORBIDE MONONITRATE 30 MG/1
30 TABLET, EXTENDED RELEASE ORAL DAILY
Qty: 30 TABLET | Refills: 0 | Status: SHIPPED | OUTPATIENT
Start: 2022-10-10 | End: 2022-12-05 | Stop reason: SDUPTHER

## 2022-10-10 RX ORDER — METOPROLOL SUCCINATE 25 MG/1
75 TABLET, EXTENDED RELEASE ORAL NIGHTLY
Qty: 270 TABLET | Refills: 0 | Status: SHIPPED | OUTPATIENT
Start: 2022-10-10 | End: 2022-12-15 | Stop reason: SDUPTHER

## 2022-10-10 RX ORDER — ASPIRIN 81 MG/1
81 TABLET ORAL DAILY
Qty: 90 TABLET | Refills: 0 | Status: SHIPPED | OUTPATIENT
Start: 2022-10-10 | End: 2023-11-07 | Stop reason: SDUPTHER

## 2022-10-10 RX ORDER — CLOPIDOGREL BISULFATE 75 MG/1
75 TABLET ORAL DAILY
Qty: 90 TABLET | Refills: 0 | Status: SHIPPED | OUTPATIENT
Start: 2022-10-10 | End: 2023-01-12 | Stop reason: SDUPTHER

## 2022-10-10 RX ORDER — LOSARTAN POTASSIUM 50 MG/1
50 TABLET ORAL DAILY
Qty: 90 TABLET | Refills: 0 | Status: SHIPPED | OUTPATIENT
Start: 2022-10-10 | End: 2022-12-15 | Stop reason: SDUPTHER

## 2022-10-10 RX ADMIN — CLOPIDOGREL BISULFATE 75 MG: 75 TABLET, FILM COATED ORAL at 08:10

## 2022-10-10 RX ADMIN — ISOSORBIDE MONONITRATE 30 MG: 30 TABLET, EXTENDED RELEASE ORAL at 08:10

## 2022-10-10 RX ADMIN — LOSARTAN POTASSIUM 100 MG: 50 TABLET, FILM COATED ORAL at 08:10

## 2022-10-10 RX ADMIN — ASPIRIN 81 MG: 81 TABLET, COATED ORAL at 08:10

## 2022-10-10 RX ADMIN — HYDROCODONE BITARTRATE AND ACETAMINOPHEN 1 TABLET: 5; 325 TABLET ORAL at 08:10

## 2022-10-10 RX ADMIN — BUPROPION HYDROCHLORIDE 150 MG: 150 TABLET, EXTENDED RELEASE ORAL at 08:10

## 2022-10-10 NOTE — PLAN OF CARE
Discharge orders and chart reviewed with no further post-acute discharge needs identified at this time.  At this time, patient is cleared for discharge from Case Management standpoint.        10/10/22 1159   Final Note   Assessment Type Final Discharge Note   Anticipated Discharge Disposition Home   Hospital Resources/Appts/Education Provided   (Patient instructed to make post hospital follow up appointment with their PCP in 7 to 10 days from discharge.)   Post-Acute Status   Coverage [employer]BS   Discharge Delays None known at this time

## 2022-10-10 NOTE — PLAN OF CARE
Reviewed discharge instructions with patient and family member at bedside. Given opportunity to ask follow-up questions. No acute signs of distress noted. Patient opting to leave floor on his own accord without wheelchair transport.    Problem: Adult Inpatient Plan of Care  Goal: Plan of Care Review  Outcome: Met  Goal: Patient-Specific Goal (Individualized)  Outcome: Met  Goal: Absence of Hospital-Acquired Illness or Injury  Outcome: Met  Goal: Optimal Comfort and Wellbeing  Outcome: Met  Goal: Readiness for Transition of Care  Outcome: Met     Problem: Chest Pain  Goal: Resolution of Chest Pain Symptoms  Outcome: Met

## 2022-10-10 NOTE — DISCHARGE SUMMARY
[facility] Medicine  Discharge Summary      Patient Name: Irving Tanner  MRN: [id number]  Patient Class: IP- Inpatient  Admission Date: 10/7/2022  Hospital Length of Stay: 2 days  Discharge Date and Time: 10/10/2022  1:41 PM  Attending Physician: Jenna att. providers found   Discharging Provider: Tessy Pascual MD  Primary Care Provider: FAIZAN Avalos        Procedure(s) (LRB):  Left heart cath (Left)  Stent, Drug Eluting, Multi Vessel, Coronary  PTCA, Single Vessel      Hospital Course:   37-year-old with prior history of hypertension, cigarette smoking came with chest pain.  Patient had atypical chest pain that worsened next day.  He was treated for unstable angina with heparin drip, Cardiology was consulted, he underwent left heart catheterization and received stent in LAD and RCA.  His cardiac regimen was adjusted.  He was cleared for discharge by Cardiology on Holter monitor considering frequent regimen is on monitor which he already had before arrival to ED. I spent considerable time explaining importance of dual antiplatelets and consequences of stopping it without MD's consultation.  Sent all prescriptions to our in-house pharmacy to ascertain delivery before discharge.  He was counseled extensively on smoking cessation and he appears motivated.     I have seen the patient on the day of discharge and reviewed the discharge instructions as outlined below. Patient verbalized understanding and is aware to contact primary care physician or return to ED if new or worsening symptoms.        Goals of Care Treatment Preferences:  Code Status: Full Code      Consults:     No new Assessment & Plan notes have been filed under this hospital service since the last note was generated.  Service: Hospital Medicine    Final Active Diagnoses:    Diagnosis Date Noted POA    Coronary artery disease involving native coronary artery of native heart with unstable angina pectoris s/p PCI [I25.110]  10/10/2022 Yes    Cigarette nicotine dependence without complication [F17.210] 09/07/2022 Yes    Mixed hyperlipidemia [E78.2] 09/07/2022 Yes    Ventricular trigeminy [I49.8] 09/07/2022 Yes    Essential hypertension [I10] 05/17/2021 Yes      Problems Resolved During this Admission:    Diagnosis Date Noted Date Resolved POA    PRINCIPAL PROBLEM:  Unstable angina [I20.0] 10/08/2022 10/10/2022 Yes    Chest pain [R07.9] 10/07/2022 10/10/2022 Yes       Discharged Condition: good    Disposition: Home or Self Care    Follow Up:   Follow-up Information     FAIZAN Avalos Follow up in 1 week(s).    Specialty: Family Medicine  Contact information:  [address]  [address]  [place] [address]  [phone]             Jose Rafael Hernandez MD Follow up in 2 week(s).    Specialties: Cardiovascular Disease, Cardiology  Contact information:  [address]  [address]  [place] [address]  [phone]                       Patient Instructions:      Diet Cardiac     Diet Cardiac     Notify your health care provider if you experience any of the following:  severe uncontrolled pain     Notify your health care provider if you experience any of the following:  difficulty breathing or increased cough     Notify your health care provider if you experience any of the following:  severe uncontrolled pain     Notify your health care provider if you experience any of the following:  difficulty breathing or increased cough     Activity as tolerated     Activity as tolerated       Significant Diagnostic Studies: Labs: All labs within the past 24 hours have been reviewed    Pending Diagnostic Studies:     None         Medications:  Reconciled Home Medications:      Medication List      START taking these medications    aspirin 81 MG EC tablet  Commonly known as: ECOTRIN  Take 1 tablet (81 mg total) by mouth once daily.     clopidogreL 75 mg tablet  Commonly known as: PLAVIX  Take 1 tablet (75 mg total) by mouth once daily.     isosorbide  mononitrate 30 MG 24 hr tablet  Commonly known as: IMDUR  Take 1 tablet (30 mg total) by mouth once daily.     losartan 50 MG tablet  Commonly known as: COZAAR  Take 1 tablet (50 mg total) by mouth once daily.        CHANGE how you take these medications    metoprolol succinate 25 MG 24 hr tablet  Commonly known as: TOPROL-XL  Take 3 tablets (75 mg total) by mouth every evening.  What changed:   · medication strength  · how much to take     rosuvastatin 20 MG tablet  Commonly known as: CRESTOR  Take 1 tablet (20 mg total) by mouth every evening.  What changed:   · medication strength  · how much to take        CONTINUE taking these medications    amitriptyline 25 MG tablet  Commonly known as: ELAVIL  Take 1 tablet (25 mg total) by mouth nightly as needed for Insomnia.     buPROPion 150 MG TBSR 12 hr tablet  Commonly known as: WELLBUTRIN SR  Take 1 tablet (150 mg total) by mouth 2 (two) times daily.        STOP taking these medications    amlodipine-olmesartan 5-40 mg per tablet  Commonly known as: JENY            Indwelling Lines/Drains at time of discharge:   Lines/Drains/Airways     None                 Time spent on the discharge of patient: 42 minutes         Tessy Pascual MD  Department of Hospital Medicine  [facility]

## 2022-10-10 NOTE — HOSPITAL COURSE
37-year-old with prior history of hypertension, cigarette smoking came with chest pain.  Patient had atypical chest pain that worsened next day.  He was treated for unstable angina with heparin drip, Cardiology was consulted, he underwent left heart catheterization and received stent in LAD and RCA.  His cardiac regimen was adjusted.  He was cleared for discharge by Cardiology on Holter monitor considering frequent regimen is on monitor which he already had before arrival to ED. I spent considerable time explaining importance of dual antiplatelets and consequences of stopping it without MD's consultation.  Sent all prescriptions to our in-house pharmacy to ascertain delivery before discharge.  He was counseled extensively on smoking cessation and he appears motivated.     I have seen the patient on the day of discharge and reviewed the discharge instructions as outlined below. Patient verbalized understanding and is aware to contact primary care physician or return to ED if new or worsening symptoms.

## 2022-10-11 ENCOUNTER — CLINICAL SUPPORT (OUTPATIENT)
Dept: CARDIOLOGY | Facility: CLINIC | Age: 37
End: 2022-10-11
Payer: COMMERCIAL

## 2022-10-11 ENCOUNTER — TELEPHONE (OUTPATIENT)
Dept: CARDIOLOGY | Facility: CLINIC | Age: 37
End: 2022-10-11
Payer: COMMERCIAL

## 2022-11-01 ENCOUNTER — TELEPHONE (OUTPATIENT)
Dept: CARDIOLOGY | Facility: HOSPITAL | Age: 37
End: 2022-11-01

## 2022-11-02 ENCOUNTER — CLINICAL SUPPORT (OUTPATIENT)
Dept: CARDIOLOGY | Facility: HOSPITAL | Age: 37
End: 2022-11-02
Attending: NURSE PRACTITIONER
Payer: COMMERCIAL

## 2022-11-02 DIAGNOSIS — I49.8 VENTRICULAR TRIGEMINY: ICD-10-CM

## 2022-11-02 PROCEDURE — 93226 XTRNL ECG REC<48 HR SCAN A/R: CPT

## 2022-11-02 PROCEDURE — 93227 XTRNL ECG REC<48 HR R&I: CPT | Mod: ,,, | Performed by: INTERNAL MEDICINE

## 2022-11-02 PROCEDURE — 93227 HOLTER MONITOR - 24 HOUR (CUPID ONLY): ICD-10-PCS | Mod: ,,, | Performed by: INTERNAL MEDICINE

## 2022-11-06 PROBLEM — D75.1 SECONDARY POLYCYTHEMIA: Status: ACTIVE | Noted: 2022-11-06

## 2022-11-06 PROBLEM — F17.219 CIGARETTE NICOTINE DEPENDENCE WITH NICOTINE-INDUCED DISORDER: Status: ACTIVE | Noted: 2022-09-07

## 2022-11-09 ENCOUNTER — TELEPHONE (OUTPATIENT)
Dept: HEMATOLOGY/ONCOLOGY | Facility: CLINIC | Age: 37
End: 2022-11-09

## 2022-11-09 LAB
OHS CV EVENT MONITOR DAY: 1
OHS CV HOLTER LENGTH DECIMAL HOURS: 48
OHS CV HOLTER LENGTH HOURS: 24
OHS CV HOLTER LENGTH MINUTES: 0
OHS CV HOLTER SINUS AVERAGE HR: 77
OHS CV HOLTER SINUS MAX HR: 130
OHS CV HOLTER SINUS MIN HR: 51

## 2022-11-14 RX ORDER — ISOSORBIDE MONONITRATE 30 MG/1
30 TABLET, EXTENDED RELEASE ORAL DAILY
Qty: 30 TABLET | Refills: 0 | Status: CANCELLED | OUTPATIENT
Start: 2022-11-14 | End: 2022-12-14

## 2022-11-15 RX ORDER — ISOSORBIDE MONONITRATE 30 MG/1
30 TABLET, EXTENDED RELEASE ORAL DAILY
Qty: 30 TABLET | Refills: 0 | OUTPATIENT
Start: 2022-11-15 | End: 2022-12-15

## 2022-12-05 DIAGNOSIS — I25.110 CORONARY ARTERY DISEASE INVOLVING NATIVE CORONARY ARTERY OF NATIVE HEART WITH UNSTABLE ANGINA PECTORIS: Primary | ICD-10-CM

## 2022-12-05 RX ORDER — ISOSORBIDE MONONITRATE 30 MG/1
30 TABLET, EXTENDED RELEASE ORAL DAILY
Qty: 30 TABLET | Refills: 0 | Status: SHIPPED | OUTPATIENT
Start: 2022-12-05 | End: 2023-01-09

## 2022-12-15 ENCOUNTER — OFFICE VISIT (OUTPATIENT)
Dept: FAMILY MEDICINE | Facility: CLINIC | Age: 37
End: 2022-12-15
Payer: COMMERCIAL

## 2022-12-15 VITALS
RESPIRATION RATE: 18 BRPM | HEART RATE: 95 BPM | WEIGHT: 216.5 LBS | HEIGHT: 70 IN | DIASTOLIC BLOOD PRESSURE: 80 MMHG | BODY MASS INDEX: 30.99 KG/M2 | SYSTOLIC BLOOD PRESSURE: 110 MMHG | OXYGEN SATURATION: 97 % | TEMPERATURE: 98 F

## 2022-12-15 DIAGNOSIS — R10.11 RIGHT UPPER QUADRANT ABDOMINAL PAIN: Primary | ICD-10-CM

## 2022-12-15 DIAGNOSIS — I25.110 CORONARY ARTERY DISEASE INVOLVING NATIVE CORONARY ARTERY OF NATIVE HEART WITH UNSTABLE ANGINA PECTORIS: ICD-10-CM

## 2022-12-15 DIAGNOSIS — E78.2 MIXED HYPERLIPIDEMIA: ICD-10-CM

## 2022-12-15 DIAGNOSIS — I10 ESSENTIAL HYPERTENSION: ICD-10-CM

## 2022-12-15 DIAGNOSIS — R14.0 ABDOMINAL DISTENSION: ICD-10-CM

## 2022-12-15 DIAGNOSIS — F17.219 CIGARETTE NICOTINE DEPENDENCE WITH NICOTINE-INDUCED DISORDER: ICD-10-CM

## 2022-12-15 PROCEDURE — 3008F PR BODY MASS INDEX (BMI) DOCUMENTED: ICD-10-PCS | Mod: CPTII,S$GLB,, | Performed by: NURSE PRACTITIONER

## 2022-12-15 PROCEDURE — 3008F BODY MASS INDEX DOCD: CPT | Mod: CPTII,S$GLB,, | Performed by: NURSE PRACTITIONER

## 2022-12-15 PROCEDURE — 1160F RVW MEDS BY RX/DR IN RCRD: CPT | Mod: CPTII,S$GLB,, | Performed by: NURSE PRACTITIONER

## 2022-12-15 PROCEDURE — 1159F PR MEDICATION LIST DOCUMENTED IN MEDICAL RECORD: ICD-10-PCS | Mod: CPTII,S$GLB,, | Performed by: NURSE PRACTITIONER

## 2022-12-15 PROCEDURE — 4010F PR ACE/ARB THEARPY RXD/TAKEN: ICD-10-PCS | Mod: CPTII,S$GLB,, | Performed by: NURSE PRACTITIONER

## 2022-12-15 PROCEDURE — 99214 PR OFFICE/OUTPT VISIT, EST, LEVL IV, 30-39 MIN: ICD-10-PCS | Mod: S$GLB,,, | Performed by: NURSE PRACTITIONER

## 2022-12-15 PROCEDURE — 1160F PR REVIEW ALL MEDS BY PRESCRIBER/CLIN PHARMACIST DOCUMENTED: ICD-10-PCS | Mod: CPTII,S$GLB,, | Performed by: NURSE PRACTITIONER

## 2022-12-15 PROCEDURE — 3079F DIAST BP 80-89 MM HG: CPT | Mod: CPTII,S$GLB,, | Performed by: NURSE PRACTITIONER

## 2022-12-15 PROCEDURE — 4010F ACE/ARB THERAPY RXD/TAKEN: CPT | Mod: CPTII,S$GLB,, | Performed by: NURSE PRACTITIONER

## 2022-12-15 PROCEDURE — 3079F PR MOST RECENT DIASTOLIC BLOOD PRESSURE 80-89 MM HG: ICD-10-PCS | Mod: CPTII,S$GLB,, | Performed by: NURSE PRACTITIONER

## 2022-12-15 PROCEDURE — 1159F MED LIST DOCD IN RCRD: CPT | Mod: CPTII,S$GLB,, | Performed by: NURSE PRACTITIONER

## 2022-12-15 PROCEDURE — 3074F SYST BP LT 130 MM HG: CPT | Mod: CPTII,S$GLB,, | Performed by: NURSE PRACTITIONER

## 2022-12-15 PROCEDURE — 99214 OFFICE O/P EST MOD 30 MIN: CPT | Mod: S$GLB,,, | Performed by: NURSE PRACTITIONER

## 2022-12-15 PROCEDURE — 3074F PR MOST RECENT SYSTOLIC BLOOD PRESSURE < 130 MM HG: ICD-10-PCS | Mod: CPTII,S$GLB,, | Performed by: NURSE PRACTITIONER

## 2022-12-15 RX ORDER — ROSUVASTATIN CALCIUM 20 MG/1
20 TABLET, COATED ORAL NIGHTLY
Qty: 90 TABLET | Refills: 0 | Status: SHIPPED | OUTPATIENT
Start: 2022-12-15 | End: 2023-04-18 | Stop reason: SDUPTHER

## 2022-12-15 RX ORDER — METOPROLOL SUCCINATE 25 MG/1
75 TABLET, EXTENDED RELEASE ORAL NIGHTLY
Qty: 270 TABLET | Refills: 0 | Status: SHIPPED | OUTPATIENT
Start: 2022-12-15 | End: 2023-03-21

## 2022-12-15 RX ORDER — LOSARTAN POTASSIUM 50 MG/1
50 TABLET ORAL DAILY
Qty: 90 TABLET | Refills: 0 | Status: SHIPPED | OUTPATIENT
Start: 2022-12-15 | End: 2023-03-21

## 2022-12-15 NOTE — PROGRESS NOTES
SUBJECTIVE:      Patient ID: Irving Tanner is a 37 y.o. male.    Chief Complaint: Follow-up (stint)    Pt presents for F/U after coronary stenting in October. This is a 37-year-old M with previous mHx of HTN, cigarette smoking, HLD, ventricular trigeminy, insomnia, and CAD S/P PCI. He presented to the ER on 10/7/2022 with complaints of CP which worsened the following day. He was seen by cardiology the previous day but when he began with CP, they advised him to present to the ER. He had some T wave and ST changes on his EKG so was admitted. He was treated for unstable angina with heparin drip, Cardiology was consulted, and he underwent left heart catheterization and received stent in LAD and RCA.  His cardiac regimen was adjusted.  He was cleared for discharge by Cardiology. He hasn't smoked cigarettes since he was discharged from the hospital and has had no further CP. He does report RUQ abdominal pain with bloating and has gained 16 pounds since the surgery. He thought it was a medication side effect but upon review the only medication with that issue is amitriptyline and he is on low dosage for sleep. Denies melena, hemochezia, nausea, vomiting, diarrhea, or constipation. He has been eating and drinking ok. He feels it is a burning type ache which worsens with eating. Denies CP, SOB, wheezing, fevers, nausea, vomiting, diarrhea, constipation, numbness, weakness, dizziness, palpitations, or any other concerns at this time.    Abdominal Pain  This is a new problem. The current episode started 1 to 4 weeks ago. The onset quality is sudden. The problem occurs constantly. The problem has been unchanged. The pain is located in the RUQ. The pain is moderate. The quality of the pain is aching and burning. The abdominal pain does not radiate. Pertinent negatives include no anorexia, arthralgias, belching, constipation, diarrhea, dysuria, fever, flatus, frequency, headaches, hematochezia, hematuria, melena, myalgias,  nausea, vomiting or weight loss. The pain is aggravated by eating and certain positions. The pain is relieved by Nothing. He has tried nothing for the symptoms.     Past Surgical History:   Procedure Laterality Date    APPENDECTOMY      FOOT FRACTURE SURGERY      FRACTURE SURGERY      Incision and Drainage of Perirectal Abscess  06/22/2018        LEFT HEART CATHETERIZATION Left 10/09/2022    Procedure: Left heart cath;  Surgeon: Jose Rafael Hernandez MD;  Location: [facility] CATH/EP LAB;  Service: Cardiology;  Laterality: Left;     Family History   Problem Relation Age of Onset    Hypertension Mother     Diabetes Mother     Thyroid disease Mother     No Known Problems Father       Social History     Socioeconomic History    Marital status:     Number of children: 1   Tobacco Use    Smoking status: Every Day     Packs/day: 1.00     Years: 15.00     Pack years: 15.00     Types: Cigarettes    Smokeless tobacco: Never   Substance and Sexual Activity    Alcohol use: No     Comment: occasionally    Drug use: No    Sexual activity: Yes     Partners: Female     Birth control/protection: None     Current Outpatient Medications   Medication Sig Dispense Refill    amitriptyline (ELAVIL) 25 MG tablet Take 1 tablet (25 mg total) by mouth nightly as needed for Insomnia. 90 tablet 1    aspirin (ECOTRIN) 81 MG EC tablet Take 1 tablet (81 mg total) by mouth once daily. 90 tablet 0    buPROPion (WELLBUTRIN SR) 150 MG TBSR 12 hr tablet Take 1 tablet (150 mg total) by mouth 2 (two) times daily. 180 tablet 2    clopidogreL (PLAVIX) 75 mg tablet Take 1 tablet (75 mg total) by mouth once daily. 90 tablet 0    isosorbide mononitrate (IMDUR) 30 MG 24 hr tablet Take 1 tablet (30 mg total) by mouth once daily. 30 tablet 0    losartan (COZAAR) 50 MG tablet Take 1 tablet (50 mg total) by mouth once daily. 90 tablet 0    metoprolol succinate (TOPROL-XL) 25 MG 24 hr tablet Take 3 tablets (75 mg total) by mouth every evening. 270 tablet 0     rosuvastatin (CRESTOR) 20 MG tablet Take 1 tablet (20 mg total) by mouth every evening. 90 tablet 0     No current facility-administered medications for this visit.     Review of patient's allergies indicates:   Allergen Reactions    Motrin [ibuprofen]      swelling      Past Medical History:   Diagnosis Date    Cigarette nicotine dependence with nicotine-induced disorder 9/7/2022    Hyperlipidemia     Hypertension     Secondary polycythemia 11/6/2022     Past Surgical History:   Procedure Laterality Date    APPENDECTOMY      FOOT FRACTURE SURGERY      FRACTURE SURGERY      Incision and Drainage of Perirectal Abscess  06/22/2018        LEFT HEART CATHETERIZATION Left 10/09/2022    Procedure: Left heart cath;  Surgeon: Jose Rafael Hernandez MD;  Location: [facility] CATH/EP LAB;  Service: Cardiology;  Laterality: Left;       Review of Systems   Constitutional:  Positive for unexpected weight change. Negative for activity change, appetite change, chills, fatigue, fever and weight loss.   HENT:  Negative for congestion, ear pain, hearing loss, mouth sores, nosebleeds, postnasal drip, rhinorrhea, sinus pressure, sinus pain, sneezing, sore throat and trouble swallowing.    Eyes:  Negative for pain, discharge and visual disturbance.   Respiratory:  Negative for apnea, cough, chest tightness, shortness of breath, wheezing and stridor.    Cardiovascular:  Negative for chest pain, palpitations and leg swelling.   Gastrointestinal:  Positive for abdominal distention and abdominal pain. Negative for anorexia, blood in stool, constipation, diarrhea, flatus, hematochezia, melena, nausea and vomiting.   Endocrine: Negative for polydipsia and polyuria.   Genitourinary:  Negative for difficulty urinating, dysuria, flank pain, frequency, hematuria and urgency.   Musculoskeletal:  Negative for arthralgias, joint swelling, myalgias, neck pain and neck stiffness.   Skin:  Negative for color change, rash and wound.   Neurological:   "Negative for dizziness, tremors, seizures, syncope, weakness, light-headedness, numbness and headaches.   Hematological:  Negative for adenopathy.   Psychiatric/Behavioral:  Negative for confusion, dysphoric mood, sleep disturbance and suicidal ideas. The patient is not nervous/anxious.     OBJECTIVE:      Vitals:    12/15/22 1319   BP: 110/80   BP Location: Left arm   Patient Position: Sitting   BP Method: Large (Manual)   Pulse: 95   Resp: 18   Temp: 98 °F (36.7 °C)   SpO2: 97%   Weight: 98.2 kg (216 lb 8 oz)   Height: 5' 10" (1.778 m)     Physical Exam  Vitals reviewed.   Constitutional:       General: He is not in acute distress.     Appearance: Normal appearance. He is well-developed. He is obese. He is not diaphoretic.   HENT:      Head: Normocephalic and atraumatic.      Right Ear: Hearing and external ear normal.      Left Ear: Hearing and external ear normal.      Nose: Nose normal.      Mouth/Throat:      Lips: Pink.      Mouth: Mucous membranes are moist.   Eyes:      General: Lids are normal. No scleral icterus.        Right eye: No discharge.         Left eye: No discharge.      Extraocular Movements: Extraocular movements intact.      Conjunctiva/sclera: Conjunctivae normal.      Pupils: Pupils are equal, round, and reactive to light.   Neck:      Thyroid: No thyroid mass or thyromegaly.      Vascular: No carotid bruit.      Trachea: Trachea and phonation normal. No tracheal deviation.   Cardiovascular:      Rate and Rhythm: Normal rate and regular rhythm.      Pulses: Normal pulses.      Heart sounds: Normal heart sounds. No murmur heard.    No friction rub. No gallop.   Pulmonary:      Effort: Pulmonary effort is normal. No respiratory distress.      Breath sounds: Normal breath sounds. No stridor. No decreased breath sounds, wheezing, rhonchi or rales.   Abdominal:      General: Bowel sounds are normal. There is distension.      Palpations: Abdomen is soft. There is no hepatomegaly, splenomegaly or " mass.      Tenderness: There is abdominal tenderness in the right upper quadrant. There is no guarding or rebound. Negative signs include Bermudez's sign.      Hernia: No hernia is present.   Musculoskeletal:         General: Normal range of motion.      Cervical back: Full passive range of motion without pain, normal range of motion and neck supple.      Right lower leg: No edema.      Left lower leg: No edema.   Lymphadenopathy:      Cervical: No cervical adenopathy.      Upper Body:      Right upper body: No supraclavicular adenopathy.      Left upper body: No supraclavicular adenopathy.   Skin:     General: Skin is warm and dry.      Capillary Refill: Capillary refill takes less than 2 seconds.      Findings: No rash.   Neurological:      General: No focal deficit present.      Mental Status: He is alert and oriented to person, place, and time.      Coordination: Coordination is intact.      Gait: Gait is intact.   Psychiatric:         Attention and Perception: Attention and perception normal.         Mood and Affect: Mood and affect normal.         Speech: Speech normal.         Behavior: Behavior normal. Behavior is cooperative.         Thought Content: Thought content normal. Thought content does not include suicidal plan.         Cognition and Memory: Cognition and memory normal.         Judgment: Judgment normal.      Assessment:       1. Right upper quadrant abdominal pain    2. Abdominal distension    3. Coronary artery disease involving native coronary artery of native heart with unstable angina pectoris s/p PCI    4. Essential hypertension    5. Mixed hyperlipidemia    6. Cigarette nicotine dependence with nicotine-induced disorder        Plan:       Right upper quadrant abdominal pain  -     CT Abdomen Pelvis W Wo Contrast; Future; Expected date: 12/15/2022  -     X-Ray Abdomen Flat And Erect; Future; Expected date: 12/15/2022    Abdominal distension  -     CT Abdomen Pelvis W Wo Contrast; Future;  Expected date: 12/15/2022  -     CBC Auto Differential; Future; Expected date: 12/15/2022  -     Comprehensive Metabolic Panel; Future; Expected date: 12/15/2022  -     BNP; Future; Expected date: 12/15/2022  -     X-Ray Abdomen Flat And Erect; Future; Expected date: 12/15/2022    Coronary artery disease involving native coronary artery of native heart with unstable angina pectoris s/p PCI  Continue F/U, medications, and treatment plan per cardiology.    Essential hypertension  Stable. Continue current medications.  -     losartan (COZAAR) 50 MG tablet; Take 1 tablet (50 mg total) by mouth once daily.  Dispense: 90 tablet; Refill: 0  -     metoprolol succinate (TOPROL-XL) 25 MG 24 hr tablet; Take 3 tablets (75 mg total) by mouth every evening.  Dispense: 270 tablet; Refill: 0    Mixed hyperlipidemia  -     rosuvastatin (CRESTOR) 20 MG tablet; Take 1 tablet (20 mg total) by mouth every evening.  Dispense: 90 tablet; Refill: 0    Cigarette nicotine dependence with nicotine-induced disorder  Continues to be a nonsmoker. Reassurance and encouragement given. Continue Wellbutrin BID.      Follow up in about 4 weeks (around 1/12/2023) for F/U HTN, lipids, abd pain.      12/15/2022 AGA Avalos, NAVINP

## 2022-12-19 ENCOUNTER — HOSPITAL ENCOUNTER (OUTPATIENT)
Dept: RADIOLOGY | Facility: HOSPITAL | Age: 37
Discharge: HOME | End: 2022-12-19
Attending: NURSE PRACTITIONER
Payer: COMMERCIAL

## 2022-12-19 ENCOUNTER — LAB VISIT (OUTPATIENT)
Dept: LAB | Facility: HOSPITAL | Age: 37
End: 2022-12-19
Attending: NURSE PRACTITIONER
Payer: COMMERCIAL

## 2022-12-19 DIAGNOSIS — R10.11 RIGHT UPPER QUADRANT ABDOMINAL PAIN: ICD-10-CM

## 2022-12-19 DIAGNOSIS — R14.0 ABDOMINAL DISTENSION: ICD-10-CM

## 2022-12-19 LAB
ALBUMIN SERPL BCP-MCNC: 4.4 G/DL (ref 3.5–5.2)
ALP SERPL-CCNC: 49 U/L (ref 55–135)
ALT SERPL W/O P-5'-P-CCNC: 122 U/L (ref 10–44)
ANION GAP SERPL CALC-SCNC: 7 MMOL/L (ref 8–16)
AST SERPL-CCNC: 47 U/L (ref 10–40)
BASOPHILS # BLD AUTO: 0.08 K/UL (ref 0–0.2)
BASOPHILS NFR BLD: 1.1 % (ref 0–1.9)
BILIRUB SERPL-MCNC: 0.9 MG/DL (ref 0.1–1)
BNP SERPL-MCNC: 13 PG/ML (ref 0–99)
BUN SERPL-MCNC: 17 MG/DL (ref 6–20)
CALCIUM SERPL-MCNC: 8.7 MG/DL (ref 8.7–10.5)
CHLORIDE SERPL-SCNC: 103 MMOL/L (ref 95–110)
CO2 SERPL-SCNC: 26 MMOL/L (ref 23–29)
CREAT SERPL-MCNC: 1 MG/DL (ref 0.5–1.4)
DIFFERENTIAL METHOD: ABNORMAL
EOSINOPHIL # BLD AUTO: 0.2 K/UL (ref 0–0.5)
EOSINOPHIL NFR BLD: 2.7 % (ref 0–8)
ERYTHROCYTE [DISTWIDTH] IN BLOOD BY AUTOMATED COUNT: 14.3 % (ref 11.5–14.5)
EST. GFR  (NO RACE VARIABLE): >60 ML/MIN/1.73 M^2
GLUCOSE SERPL-MCNC: 82 MG/DL (ref 70–110)
HCT VFR BLD AUTO: 51.2 % (ref 40–54)
HGB BLD-MCNC: 17.5 G/DL (ref 14–18)
IMM GRANULOCYTES # BLD AUTO: 0.02 K/UL (ref 0–0.04)
IMM GRANULOCYTES NFR BLD AUTO: 0.3 % (ref 0–0.5)
LYMPHOCYTES # BLD AUTO: 2.2 K/UL (ref 1–4.8)
LYMPHOCYTES NFR BLD: 31.8 % (ref 18–48)
MCH RBC QN AUTO: 31.8 PG (ref 27–31)
MCHC RBC AUTO-ENTMCNC: 34.2 G/DL (ref 32–36)
MCV RBC AUTO: 93 FL (ref 82–98)
MONOCYTES # BLD AUTO: 0.7 K/UL (ref 0.3–1)
MONOCYTES NFR BLD: 10.1 % (ref 4–15)
NEUTROPHILS # BLD AUTO: 3.8 K/UL (ref 1.8–7.7)
NEUTROPHILS NFR BLD: 54 % (ref 38–73)
NRBC BLD-RTO: 0 /100 WBC
PLATELET # BLD AUTO: 291 K/UL (ref 150–450)
PMV BLD AUTO: 9.3 FL (ref 9.2–12.9)
POTASSIUM SERPL-SCNC: 3.8 MMOL/L (ref 3.5–5.1)
PROT SERPL-MCNC: 7.6 G/DL (ref 6–8.4)
RBC # BLD AUTO: 5.5 M/UL (ref 4.6–6.2)
SODIUM SERPL-SCNC: 136 MMOL/L (ref 136–145)
WBC # BLD AUTO: 7.04 K/UL (ref 3.9–12.7)

## 2022-12-19 PROCEDURE — 36415 COLL VENOUS BLD VENIPUNCTURE: CPT | Performed by: NURSE PRACTITIONER

## 2022-12-19 PROCEDURE — 83880 ASSAY OF NATRIURETIC PEPTIDE: CPT | Performed by: NURSE PRACTITIONER

## 2022-12-19 PROCEDURE — 74019 RADEX ABDOMEN 2 VIEWS: CPT | Mod: TC,PO

## 2022-12-19 PROCEDURE — 85025 COMPLETE CBC W/AUTO DIFF WBC: CPT | Performed by: NURSE PRACTITIONER

## 2022-12-19 PROCEDURE — 80053 COMPREHEN METABOLIC PANEL: CPT | Performed by: NURSE PRACTITIONER

## 2022-12-20 DIAGNOSIS — R10.11 RIGHT UPPER QUADRANT ABDOMINAL PAIN: Primary | ICD-10-CM

## 2022-12-20 DIAGNOSIS — R79.89 ELEVATED LFTS: ICD-10-CM

## 2022-12-20 DIAGNOSIS — R14.0 ABDOMINAL DISTENSION: ICD-10-CM

## 2022-12-21 ENCOUNTER — HOSPITAL ENCOUNTER (OUTPATIENT)
Dept: RADIOLOGY | Facility: HOSPITAL | Age: 37
Discharge: HOME | End: 2022-12-21
Attending: NURSE PRACTITIONER
Payer: COMMERCIAL

## 2022-12-21 ENCOUNTER — CLINICAL SUPPORT (OUTPATIENT)
Dept: SMOKING CESSATION | Facility: CLINIC | Age: 37
End: 2022-12-21

## 2022-12-21 DIAGNOSIS — F17.200 NICOTINE DEPENDENCE: Primary | ICD-10-CM

## 2022-12-21 DIAGNOSIS — R79.89 ELEVATED LFTS: ICD-10-CM

## 2022-12-21 DIAGNOSIS — R10.11 RIGHT UPPER QUADRANT ABDOMINAL PAIN: ICD-10-CM

## 2022-12-21 DIAGNOSIS — R14.0 ABDOMINAL DISTENSION: ICD-10-CM

## 2022-12-21 PROCEDURE — 99999 PR PBB SHADOW E&M-EST. PATIENT-LVL I: CPT | Mod: PBBFAC,,,

## 2022-12-21 PROCEDURE — 99999 PR PBB SHADOW E&M-EST. PATIENT-LVL I: ICD-10-PCS | Mod: PBBFAC,,,

## 2022-12-21 PROCEDURE — 76700 US EXAM ABDOM COMPLETE: CPT | Mod: TC

## 2022-12-21 PROCEDURE — 99407 PR TOBACCO USE CESSATION INTENSIVE >10 MINUTES: ICD-10-PCS | Mod: S$GLB,,,

## 2022-12-21 PROCEDURE — 99407 BEHAV CHNG SMOKING > 10 MIN: CPT | Mod: S$GLB,,,

## 2022-12-21 NOTE — PROGRESS NOTES
Spoke with patient today in regard to smoking cessation progress for 12-month telephone follow up. Patient states that he is tobacco free.  Patient stated he quit after heart cath and stent placement.  Commended patient on their quit.  Patient states he is doing well with his quit. Informed patient of benefit period, future follow up, and contact information if any further help or support is needed. Will complete smart form for 6/12 month follow up and resolve Quit attempt #1.

## 2022-12-22 ENCOUNTER — HOSPITAL ENCOUNTER (EMERGENCY)
Facility: HOSPITAL | Age: 37
Discharge: HOME | End: 2022-12-22
Attending: EMERGENCY MEDICINE
Payer: COMMERCIAL

## 2022-12-22 VITALS
TEMPERATURE: 98 F | BODY MASS INDEX: 31.5 KG/M2 | HEIGHT: 70 IN | RESPIRATION RATE: 19 BRPM | WEIGHT: 220 LBS | DIASTOLIC BLOOD PRESSURE: 65 MMHG | SYSTOLIC BLOOD PRESSURE: 119 MMHG | OXYGEN SATURATION: 96 % | HEART RATE: 78 BPM

## 2022-12-22 DIAGNOSIS — R10.11 RIGHT UPPER QUADRANT ABDOMINAL PAIN: Primary | ICD-10-CM

## 2022-12-22 DIAGNOSIS — R14.0 ABDOMINAL DISTENSION: ICD-10-CM

## 2022-12-22 DIAGNOSIS — K76.0 FATTY LIVER: ICD-10-CM

## 2022-12-22 DIAGNOSIS — R10.11 RUQ ABDOMINAL PAIN: Primary | ICD-10-CM

## 2022-12-22 DIAGNOSIS — R10.13 EPIGASTRIC PAIN: ICD-10-CM

## 2022-12-22 LAB
ALBUMIN SERPL BCP-MCNC: 4.6 G/DL (ref 3.5–5.2)
ALP SERPL-CCNC: 48 U/L (ref 55–135)
ALT SERPL W/O P-5'-P-CCNC: 132 U/L (ref 10–44)
ANION GAP SERPL CALC-SCNC: 7 MMOL/L (ref 8–16)
AST SERPL-CCNC: 44 U/L (ref 10–40)
BASOPHILS # BLD AUTO: 0.08 K/UL (ref 0–0.2)
BASOPHILS NFR BLD: 0.9 % (ref 0–1.9)
BILIRUB SERPL-MCNC: 0.6 MG/DL (ref 0.1–1)
BUN SERPL-MCNC: 15 MG/DL (ref 6–20)
CALCIUM SERPL-MCNC: 9.5 MG/DL (ref 8.7–10.5)
CHLORIDE SERPL-SCNC: 103 MMOL/L (ref 95–110)
CO2 SERPL-SCNC: 26 MMOL/L (ref 23–29)
CREAT SERPL-MCNC: 0.9 MG/DL (ref 0.5–1.4)
CREAT SERPL-MCNC: 0.9 MG/DL (ref 0.5–1.4)
DIFFERENTIAL METHOD: ABNORMAL
EOSINOPHIL # BLD AUTO: 0.2 K/UL (ref 0–0.5)
EOSINOPHIL NFR BLD: 1.9 % (ref 0–8)
ERYTHROCYTE [DISTWIDTH] IN BLOOD BY AUTOMATED COUNT: 14.1 % (ref 11.5–14.5)
EST. GFR  (NO RACE VARIABLE): >60 ML/MIN/1.73 M^2
GLUCOSE SERPL-MCNC: 91 MG/DL (ref 70–110)
HCT VFR BLD AUTO: 48.6 % (ref 40–54)
HGB BLD-MCNC: 17.1 G/DL (ref 14–18)
IMM GRANULOCYTES # BLD AUTO: 0.03 K/UL (ref 0–0.04)
IMM GRANULOCYTES NFR BLD AUTO: 0.3 % (ref 0–0.5)
LACTATE SERPL-SCNC: 1.3 MMOL/L (ref 0.5–1.9)
LIPASE SERPL-CCNC: 33 U/L (ref 4–60)
LYMPHOCYTES # BLD AUTO: 2.6 K/UL (ref 1–4.8)
LYMPHOCYTES NFR BLD: 27.6 % (ref 18–48)
MCH RBC QN AUTO: 31.9 PG (ref 27–31)
MCHC RBC AUTO-ENTMCNC: 35.2 G/DL (ref 32–36)
MCV RBC AUTO: 91 FL (ref 82–98)
MONOCYTES # BLD AUTO: 0.8 K/UL (ref 0.3–1)
MONOCYTES NFR BLD: 8.2 % (ref 4–15)
NEUTROPHILS # BLD AUTO: 5.7 K/UL (ref 1.8–7.7)
NEUTROPHILS NFR BLD: 61.1 % (ref 38–73)
NRBC BLD-RTO: 0 /100 WBC
PLATELET # BLD AUTO: 283 K/UL (ref 150–450)
PMV BLD AUTO: 9.1 FL (ref 9.2–12.9)
POTASSIUM SERPL-SCNC: 4.1 MMOL/L (ref 3.5–5.1)
PROT SERPL-MCNC: 7.9 G/DL (ref 6–8.4)
RBC # BLD AUTO: 5.36 M/UL (ref 4.6–6.2)
SAMPLE: NORMAL
SODIUM SERPL-SCNC: 136 MMOL/L (ref 136–145)
TROPONIN I SERPL HS-MCNC: 5.4 PG/ML (ref 0–14.9)
WBC # BLD AUTO: 9.29 K/UL (ref 3.9–12.7)

## 2022-12-22 PROCEDURE — 84484 ASSAY OF TROPONIN QUANT: CPT | Performed by: EMERGENCY MEDICINE

## 2022-12-22 PROCEDURE — 85025 COMPLETE CBC W/AUTO DIFF WBC: CPT | Performed by: NURSE PRACTITIONER

## 2022-12-22 PROCEDURE — 25000003 PHARM REV CODE 250: Performed by: NURSE PRACTITIONER

## 2022-12-22 PROCEDURE — 80053 COMPREHEN METABOLIC PANEL: CPT | Performed by: NURSE PRACTITIONER

## 2022-12-22 PROCEDURE — 96374 THER/PROPH/DIAG INJ IV PUSH: CPT | Mod: 59

## 2022-12-22 PROCEDURE — 99285 EMERGENCY DEPT VISIT HI MDM: CPT | Mod: 25

## 2022-12-22 PROCEDURE — 83605 ASSAY OF LACTIC ACID: CPT | Performed by: NURSE PRACTITIONER

## 2022-12-22 PROCEDURE — 93010 EKG 12-LEAD: ICD-10-PCS | Mod: ,,, | Performed by: GENERAL PRACTICE

## 2022-12-22 PROCEDURE — 63600175 PHARM REV CODE 636 W HCPCS: Performed by: NURSE PRACTITIONER

## 2022-12-22 PROCEDURE — 36415 COLL VENOUS BLD VENIPUNCTURE: CPT | Performed by: EMERGENCY MEDICINE

## 2022-12-22 PROCEDURE — 96375 TX/PRO/DX INJ NEW DRUG ADDON: CPT

## 2022-12-22 PROCEDURE — 93005 ELECTROCARDIOGRAM TRACING: CPT | Performed by: GENERAL PRACTICE

## 2022-12-22 PROCEDURE — 93010 ELECTROCARDIOGRAM REPORT: CPT | Mod: ,,, | Performed by: GENERAL PRACTICE

## 2022-12-22 PROCEDURE — 25500020 PHARM REV CODE 255: Performed by: EMERGENCY MEDICINE

## 2022-12-22 PROCEDURE — 83690 ASSAY OF LIPASE: CPT | Performed by: NURSE PRACTITIONER

## 2022-12-22 RX ORDER — SUCRALFATE 1 G/10ML
1 SUSPENSION ORAL 4 TIMES DAILY
Qty: 400 ML | Refills: 0 | Status: SHIPPED | OUTPATIENT
Start: 2022-12-22 | End: 2023-01-17

## 2022-12-22 RX ORDER — DROPERIDOL 2.5 MG/ML
1.25 INJECTION, SOLUTION INTRAMUSCULAR; INTRAVENOUS
Status: COMPLETED | OUTPATIENT
Start: 2022-12-22 | End: 2022-12-22

## 2022-12-22 RX ORDER — DIPHENHYDRAMINE HYDROCHLORIDE 50 MG/ML
12.5 INJECTION INTRAMUSCULAR; INTRAVENOUS
Status: COMPLETED | OUTPATIENT
Start: 2022-12-22 | End: 2022-12-22

## 2022-12-22 RX ADMIN — DROPERIDOL 1.25 MG: 2.5 INJECTION, SOLUTION INTRAMUSCULAR; INTRAVENOUS at 07:12

## 2022-12-22 RX ADMIN — SODIUM CHLORIDE 1000 ML: 0.9 INJECTION, SOLUTION INTRAVENOUS at 07:12

## 2022-12-22 RX ADMIN — IOHEXOL 100 ML: 350 INJECTION, SOLUTION INTRAVENOUS at 07:12

## 2022-12-22 RX ADMIN — DIPHENHYDRAMINE HYDROCHLORIDE 12.5 MG: 50 INJECTION, SOLUTION INTRAMUSCULAR; INTRAVENOUS at 07:12

## 2022-12-23 NOTE — ED PROVIDER NOTES
Source of History:  Patient, spouse, chart    Chief complaint:  Abdominal Pain (RUQ PAIN FOR AWHILE)      HPI:  Irving Tanner is a 37 y.o. male with medical history of hypertension, CAD presenting with right upper quadrant abdominal pain and burning.  Patient states he has had pain and bloating for the past 6 weeks.  Patient was seen by PCP yesterday and referred to GI.  Patient states today while driving home from work pain increased.  Patient denies any associated nausea or vomiting.    This is the extent to the patients complaints today here in the emergency department.    ROS: As per HPI and below:  Constitutional: No fever.  No chills.  Eyes: No visual changes.  ENT: No sore throat. No ear pain    Cardiovascular: No chest pain.  Respiratory: No shortness of breath.  GI:  Positive for abdominal pain.  Positive for abdominal distension.  Genitourinary: No abnormal urination.  Neurologic: No headache. No focal weakness.  No numbness.  MSK: no back pain.  Integument: No rashes or lesions.  Hematologic: No easy bruising.  Endocrine: No excessive thirst or urination.    Review of patient's allergies indicates:   Allergen Reactions    Motrin [ibuprofen]      swelling       PMH:  As per HPI and below:  Past Medical History:   Diagnosis Date    Cigarette nicotine dependence with nicotine-induced disorder 9/7/2022    Hyperlipidemia     Hypertension     Secondary polycythemia 11/6/2022     Past Surgical History:   Procedure Laterality Date    APPENDECTOMY      FOOT FRACTURE SURGERY      FRACTURE SURGERY      Incision and Drainage of Perirectal Abscess  06/22/2018        LEFT HEART CATHETERIZATION Left 10/09/2022    Procedure: Left heart cath;  Surgeon: Jose Rafael Hernandez MD;  Location: [facility] CATH/EP LAB;  Service: Cardiology;  Laterality: Left;       Social History     Tobacco Use    Smoking status: Every Day     Packs/day: 1.00     Years: 15.00     Pack years: 15.00     Types: Cigarettes    Smokeless tobacco:  "Never   Substance Use Topics    Alcohol use: No     Comment: occasionally    Drug use: No       Physical Exam:    BP (!) 165/105   Pulse 90   Temp 98.6 °F (37 °C) (Oral)   Resp 18   Ht 5' 10" (1.778 m)   Wt 99.8 kg (220 lb)   SpO2 96%   BMI 31.57 kg/m²   Nursing note and vital signs reviewed.  Constitutional:  Distressed due to pain.  Nontoxic  Eyes: No conjunctival injection.  Extraocular muscles are intact.  ENT: Oropharynx clear.  Normal phonation.  Cardiovascular: Regular rate and rhythm.  No murmurs. No gallops. No rubs  Respiratory: Clear to auscultation bilaterally.  Good air movement.  No wheezes.  No rhonchi. No rales. No accessory muscle use.  Abdomen:  Abdomen distended and firm.  No signs of ascites.  Non peritoneal.  Bowel sounds within normal limits.  Musculoskeletal: Good range of motion all joints.  No deformities.  Neck supple.  No meningismus.  Skin: No rashes seen.  Good turgor.  No abrasions.  No ecchymoses.  Neuro: alert and oriented x3,  no focal neurological deficits.  Psych: Appropriate, conversant    Labs that have been ordered have been independently reviewed and interpreted by myself.    Labs Reviewed   CBC W/ AUTO DIFFERENTIAL - Abnormal; Notable for the following components:       Result Value    MCH 31.9 (*)     MPV 9.1 (*)     All other components within normal limits   COMPREHENSIVE METABOLIC PANEL - Abnormal; Notable for the following components:    Alkaline Phosphatase 48 (*)     AST 44 (*)      (*)     Anion Gap 7 (*)     All other components within normal limits   LIPASE   LACTIC ACID, PLASMA   TROPONIN I HIGH SENSITIVITY   TROPONIN I HIGH SENSITIVITY   URINALYSIS, REFLEX TO URINE CULTURE   DRUG SCREEN PANEL, URINE EMERGENCY   ISTAT CREATININE   POCT CREATININE     Imaging Results              CT Abdomen Pelvis With Contrast (Final result)  Result time 12/22/22 20:39:23      Final result by Hannah Kim MD (12/22/22 20:39:23)                   Narrative:    " EXAM:  CT Abdomen and Pelvis With Intravenous Contrast    CLINICAL HISTORY:  The patient is 37 years old and is Male; Epigastric pain; RUQ pain    TECHNIQUE:  Axial computed tomography images of the abdomen and pelvis with intravenous contrast.  Sagittal and coronal reformatted images were created and reviewed.  This CT exam was performed using one or more of the following dose reduction techniques:  automated exposure control, adjustment of the mA and/or kV according to patient size, and/or use of iterative reconstruction technique.    COMPARISON:  CT June 11, 2020    FINDINGS:  LUNG BASES:  Unremarkable.  No mass.  No consolidation.    ABDOMEN:  LIVER:  The liver is enlarged and diffusely fatty.  GALLBLADDER AND BILE DUCTS:  No calcified stones.  No ductal dilation.  PANCREAS:  No ductal dilation.  No mass.  SPLEEN:  Unremarkable.  ADRENALS:  Unremarkable.  No mass.  KIDNEYS AND URETERS:  Unremarkable. The kidneys enhance symmetrically. No obstructing renal or ureteral calculus is seen. No hydronephrosis or hydroureter. No perinephric fluid or stranding.  STOMACH AND BOWEL:  The stomach is decompressed. The small bowel is normal in caliber. Stool is present throughout colon. A few scattered colonic diverticula are noted without surrounding inflammation. There is no mucosal thickening or evidence of obstruction.    PELVIS:  APPENDIX:  No findings to suggest acute appendicitis.  BLADDER:  Unremarkable.  No mass.  REPRODUCTIVE:  Unremarkable as visualized.    ABDOMEN and PELVIS:  INTRAPERITONEAL SPACE:  Unremarkable.  No free air.  No significant fluid collection.  BONES/JOINTS:  Multilevel degenerative change of the spine is present.  SOFT TISSUES:  The soft tissues are normal.  VASCULATURE:  Unremarkable.  No abdominal aortic aneurysm.  LYMPH NODES:  Unremarkable. No enlarged lymph nodes.    IMPRESSION:  No acute findings on this contrasted CT of the abdomen and pelvis to explain the patient's  symptoms.    Electronically signed by:  Hannah Kim MD  12/22/2022 8:39 PM CST Workstation: [id number]                                  I decided to obtain the patient's medical records.  Summary of Medical Records:  Followed by PCP for chronic right upper quadrant pain.  Referral placed for GI follow-up.    MDM/ Differential Dx:   Emergent evaluation of a 36 yo male presenting for right upper quadrant pain/burning.  Patient states he has had pain and bloating for the past 6 weeks but symptoms worsened today when driving home from work.  Patient denies any change with eating.  On exam pt is A&Ox3. VSS.  Distressed due to pain.  Nonfebrile and nontoxic appearing.  Multiple dental caries noted.  Mucous membranes pink and moist.Breath sounds clear bilaterally.  Abdomen distended and firm.  No signs of ascites.  Non peritoneal.  Bowel sounds within normal limits. Pt speaking in full sentences.  Steady gait appreciated. Cap refill < 3 seconds.      Differential diagnoses include but are not limited to cholecystitis, cholelithiasis, hepatitis, appendicitis, peptic ulcer disease, pancreatitis, gastritis, pneumonia, pyelonephritis, musculoskeletal, others.      I will get labs, imaging, medicate and reassess.  I discussed this case with my supervising physician.      ED Course as of 12/22/22 2139   Thu Dec 22, 2022   1938 EKG shows sinus rhythm with short VA.  No STEMI.  Rate of 84.  [RZ]   2113 CBC unremarkable.  No leukocytosis noted.  H&H stable at 17.1 and 48.6.  CMP shows elevated AST and ALT.  This appears patient's baseline.  Total bili within normal limits.  Lipase unremarkable.  Troponin negative. [RZ]   2113 CT negative for any acute abnormalities.  Awaiting urine. [RZ]   2130 Patient reassessed.  Patient states feeling much better.  Patient advised that we will put him on a few days of sucralfate to help coat his stomach.  Given information to follow-up with GI.  Patient already has a GI referral in place by  his PCP.  Advised to increase fluids and bland diet.  Strict return to ED precautions discussed.  Patient verbalized understanding of this plan of care.  All questions and concerns addressed. [RZ]   2139 Patient is hemodynamically stable, vital signs are normal. Discharge instructions given. Return to ED precautions discussed. Follow up as directed. Pt verbalized understanding of this plan.  Pt is stable for discharge.  [RZ]      ED Course User Index  [RZ] Leigha Dewey NP               Diagnostic Impression:    1. RUQ abdominal pain    2. Epigastric pain         ED Disposition Condition    Discharge Stable            ED Prescriptions       Medication Sig Dispense Start Date End Date Auth. Provider    sucralfate (CARAFATE) 100 mg/mL suspension Take 10 mLs (1 g total) by mouth 4 (four) times daily. for 10 days 400 mL 12/22/2022 1/1/2023 Leigha Dewey NP          Follow-up Information       Follow up With Specialties Details Why Contact Info    [facility] Schedule an appointment as soon as possible for a visit  As needed [address] [facility]  [address]  [place] [address]  [phone]      William Bone III, MD Gastroenterology Schedule an appointment as soon as possible for a visit   [address] [place] [address] [address] [place] [id number]  [phone]                 Leigha Dewey NP  12/22/22 2139

## 2023-01-09 RX ORDER — CLOPIDOGREL BISULFATE 75 MG/1
75 TABLET ORAL DAILY
Qty: 90 TABLET | Refills: 0 | OUTPATIENT
Start: 2023-01-09 | End: 2023-04-09

## 2023-01-09 NOTE — TELEPHONE ENCOUNTER
Detail Level: Zone
Please have him request this medication from his cardiologist.  
Requesting refill for  Plavix     Last visit 12/15/2022   Next visit  1/17/2023  
Detail Level: Detailed

## 2023-01-12 ENCOUNTER — OFFICE VISIT (OUTPATIENT)
Dept: CARDIOLOGY | Facility: CLINIC | Age: 38
End: 2023-01-12
Payer: COMMERCIAL

## 2023-01-12 VITALS
HEIGHT: 70 IN | WEIGHT: 218.25 LBS | SYSTOLIC BLOOD PRESSURE: 150 MMHG | HEART RATE: 76 BPM | DIASTOLIC BLOOD PRESSURE: 100 MMHG | BODY MASS INDEX: 31.25 KG/M2

## 2023-01-12 DIAGNOSIS — I49.8 VENTRICULAR TRIGEMINY: ICD-10-CM

## 2023-01-12 DIAGNOSIS — I25.110 CORONARY ARTERY DISEASE INVOLVING NATIVE CORONARY ARTERY OF NATIVE HEART WITH UNSTABLE ANGINA PECTORIS: Primary | ICD-10-CM

## 2023-01-12 DIAGNOSIS — I10 ESSENTIAL HYPERTENSION: ICD-10-CM

## 2023-01-12 DIAGNOSIS — E78.2 MIXED HYPERLIPIDEMIA: ICD-10-CM

## 2023-01-12 PROCEDURE — 3080F DIAST BP >= 90 MM HG: CPT | Mod: CPTII,S$GLB,, | Performed by: NURSE PRACTITIONER

## 2023-01-12 PROCEDURE — 3080F PR MOST RECENT DIASTOLIC BLOOD PRESSURE >= 90 MM HG: ICD-10-PCS | Mod: CPTII,S$GLB,, | Performed by: NURSE PRACTITIONER

## 2023-01-12 PROCEDURE — 3077F SYST BP >= 140 MM HG: CPT | Mod: CPTII,S$GLB,, | Performed by: NURSE PRACTITIONER

## 2023-01-12 PROCEDURE — 3008F PR BODY MASS INDEX (BMI) DOCUMENTED: ICD-10-PCS | Mod: CPTII,S$GLB,, | Performed by: NURSE PRACTITIONER

## 2023-01-12 PROCEDURE — 1160F PR REVIEW ALL MEDS BY PRESCRIBER/CLIN PHARMACIST DOCUMENTED: ICD-10-PCS | Mod: CPTII,S$GLB,, | Performed by: NURSE PRACTITIONER

## 2023-01-12 PROCEDURE — 1159F MED LIST DOCD IN RCRD: CPT | Mod: CPTII,S$GLB,, | Performed by: NURSE PRACTITIONER

## 2023-01-12 PROCEDURE — 3008F BODY MASS INDEX DOCD: CPT | Mod: CPTII,S$GLB,, | Performed by: NURSE PRACTITIONER

## 2023-01-12 PROCEDURE — 3077F PR MOST RECENT SYSTOLIC BLOOD PRESSURE >= 140 MM HG: ICD-10-PCS | Mod: CPTII,S$GLB,, | Performed by: NURSE PRACTITIONER

## 2023-01-12 PROCEDURE — 1159F PR MEDICATION LIST DOCUMENTED IN MEDICAL RECORD: ICD-10-PCS | Mod: CPTII,S$GLB,, | Performed by: NURSE PRACTITIONER

## 2023-01-12 PROCEDURE — 99999 PR PBB SHADOW E&M-EST. PATIENT-LVL III: CPT | Mod: PBBFAC,,, | Performed by: NURSE PRACTITIONER

## 2023-01-12 PROCEDURE — 99213 OFFICE O/P EST LOW 20 MIN: CPT | Mod: S$GLB,,, | Performed by: NURSE PRACTITIONER

## 2023-01-12 PROCEDURE — 1160F RVW MEDS BY RX/DR IN RCRD: CPT | Mod: CPTII,S$GLB,, | Performed by: NURSE PRACTITIONER

## 2023-01-12 PROCEDURE — 99999 PR PBB SHADOW E&M-EST. PATIENT-LVL III: ICD-10-PCS | Mod: PBBFAC,,, | Performed by: NURSE PRACTITIONER

## 2023-01-12 PROCEDURE — 99213 PR OFFICE/OUTPT VISIT, EST, LEVL III, 20-29 MIN: ICD-10-PCS | Mod: S$GLB,,, | Performed by: NURSE PRACTITIONER

## 2023-01-12 RX ORDER — HYDROCHLOROTHIAZIDE 25 MG/1
25 TABLET ORAL DAILY
Qty: 90 TABLET | Refills: 3 | Status: SHIPPED | OUTPATIENT
Start: 2023-01-12 | End: 2023-08-17 | Stop reason: ALTCHOICE

## 2023-01-12 RX ORDER — CLOPIDOGREL BISULFATE 75 MG/1
75 TABLET ORAL DAILY
Qty: 90 TABLET | Refills: 3 | Status: SHIPPED | OUTPATIENT
Start: 2023-01-12 | End: 2023-01-12

## 2023-01-12 RX ORDER — ISOSORBIDE MONONITRATE 30 MG/1
30 TABLET, EXTENDED RELEASE ORAL DAILY
Qty: 90 TABLET | Refills: 3 | Status: SHIPPED | OUTPATIENT
Start: 2023-01-12 | End: 2023-11-07 | Stop reason: SDUPTHER

## 2023-01-12 RX ORDER — CLOPIDOGREL BISULFATE 75 MG/1
75 TABLET ORAL DAILY
Qty: 90 TABLET | Refills: 3 | Status: SHIPPED | OUTPATIENT
Start: 2023-01-12 | End: 2023-08-17 | Stop reason: SDUPTHER

## 2023-01-12 NOTE — ASSESSMENT & PLAN NOTE
LDL on 10/08/2022 was 62.6.  Patient was advised to continue Crestor 20 mg p.o. q.h.s. and adhere to a low-cholesterol low-carbohydrate diet.

## 2023-01-12 NOTE — ASSESSMENT & PLAN NOTE
Patient is status post PCI on 10/09/2022.  He is to continue aspirin 81 mg p.o. daily and Plavix 70 mg p.o. daily Imdur 30 mg p.o. daily.  He has had no chest pain and he is trying to follow a low-sodium diet

## 2023-01-12 NOTE — ASSESSMENT & PLAN NOTE
Blood pressure is uncontrolled in the office today.  150/100 mm Hg.  Patient is on losartan 50 mg p.o. daily, Toprol 75 mg p.o. nightly, Imdur 30 mg p.o. q.h.s. 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  Discussed long-term effects of elevated blood pressure and importance of monitoring and controlling it.  Patient agrees to monitors blood pressure at home and report elevated readings.  He and his wife both state his blood pressure is usually 120 to 130/80.  I added hydrochlorothiazide 25 mg p.o. daily to his regimen  He was advised to adhere to a cardiac low-sodium diet and try to incorporate routine low-impact exercise

## 2023-01-12 NOTE — ASSESSMENT & PLAN NOTE
Heart rate and rhythm are regular in the office today with no irregular beats noted.  He is had no palpitations, dizziness, syncope or chest pain

## 2023-01-12 NOTE — PROGRESS NOTES
Subjective:    Patient ID:  Irving Tanner is a 37 y.o. male patient here for evaluation Coronary Artery Disease, Hyperlipidemia, and Hypertension      History of Present Illness:     Patient is here with his wife for follow-up status post PCI on 10/09/2022.  Cath report is copied below.      Patient has not had any chest pain, shortness breath, dizziness, syncope.  His blood pressure is elevated in the office today but he states that his blood pressures actually has been on the low side at home.  He states his blood pressures generally 120-130/80.  He states he has been up all night and only went to sleep at 7:00 a.m. then had wake up with only a few hours sleep to come to this appointment.  He has been having some right upper quadrant abdominal pain and is following up with his primary care provider for this.  He needs medication refills on Plavix and isosorbide.  He denies any bleeding tendencies from the Plavix.  He states he quit smoking after the cardiac catheterization.  His wife is making sure he is eating healthy on a cardiac diet      The 2nd Diag lesion was 85% stenosed with 0% stenosis post-intervention.    The Prox LAD lesion was 99% stenosed with 0% stenosis post-intervention.    The Dist RCA lesion was 99% stenosed with 0% stenosis post-intervention.    A STENT JIGNESH MI 4.0 X 18 stent was successfully placed.    A STENT JIGNESH MI 3.0 X 22 stent was successfully placed at 12 LEXI for 22 sec.    The estimated blood loss was none.    There was two vessel coronary artery disease.    The PCI was successful.    Review of patient's allergies indicates:   Allergen Reactions    Motrin [ibuprofen]      swelling       Past Medical History:   Diagnosis Date    Cigarette nicotine dependence with nicotine-induced disorder 9/7/2022    Hyperlipidemia     Hypertension     Secondary polycythemia 11/6/2022     Past Surgical History:   Procedure Laterality Date    APPENDECTOMY      FOOT FRACTURE SURGERY      FRACTURE  SURGERY      Incision and Drainage of Perirectal Abscess  06/22/2018        LEFT HEART CATHETERIZATION Left 10/09/2022    Procedure: Left heart cath;  Surgeon: Jose Rafael Hernandez MD;  Location: [facility] CATH/EP LAB;  Service: Cardiology;  Laterality: Left;     Social History     Tobacco Use    Smoking status: Every Day     Packs/day: 1.00     Years: 15.00     Pack years: 15.00     Types: Cigarettes    Smokeless tobacco: Never   Substance Use Topics    Alcohol use: No     Comment: occasionally    Drug use: No        REVIEW OF SYSTEMS: As noted in HPI   CARDIOVASCULAR: No recent chest pain, palpitations, arm, neck, or jaw pain  RESPIRATORY: No recent fever, cough chills, SOB or congestion  : No blood in the urine  GI: No Nausea, vomiting, constipation, diarrhea, blood, or reflux.  MUSCULOSKELETAL: No myalgias  NEURO: No lightheadedness or dizziness  EYES: No Double vision, blurry, vision or headache        Objective        Vitals:    01/12/23 1331   BP: (!) 150/100   Pulse: 76       LIPIDS - LAST 2   Lab Results   Component Value Date    CHOL 152 10/08/2022    CHOL 163 09/22/2022    HDL 43 10/08/2022    HDL 48 09/22/2022    LDLCALC 62.6 (L) 10/08/2022    LDLCALC 85 09/22/2022    TRIG 232 (H) 10/08/2022    TRIG 208 (H) 09/22/2022    CHOLHDL 28.3 10/08/2022    CHOLHDL 3.4 09/22/2022       CBC - LAST 2  Lab Results   Component Value Date    WBC 9.29 12/22/2022    WBC 7.04 12/19/2022    RBC 5.36 12/22/2022    RBC 5.50 12/19/2022    HGB 17.1 12/22/2022    HGB 17.5 12/19/2022    HCT 48.6 12/22/2022    HCT 51.2 12/19/2022    MCV 91 12/22/2022    MCV 93 12/19/2022    MCH 31.9 (H) 12/22/2022    MCH 31.8 (H) 12/19/2022    MCHC 35.2 12/22/2022    MCHC 34.2 12/19/2022    RDW 14.1 12/22/2022    RDW 14.3 12/19/2022     12/22/2022     12/19/2022    MPV 9.1 (L) 12/22/2022    MPV 9.3 12/19/2022    GRAN 5.7 12/22/2022    GRAN 61.1 12/22/2022    LYMPH 2.6 12/22/2022    LYMPH 27.6 12/22/2022    MONO 0.8 12/22/2022     MONO 8.2 12/22/2022    BASO 0.08 12/22/2022    BASO 0.08 12/19/2022    NRBC 0 12/22/2022    NRBC 0 12/19/2022       CHEMISTRY & LIVER FUNCTION - LAST 2  Lab Results   Component Value Date     12/22/2022     12/19/2022    K 4.1 12/22/2022    K 3.8 12/19/2022     12/22/2022     12/19/2022    CO2 26 12/22/2022    CO2 26 12/19/2022    ANIONGAP 7 (L) 12/22/2022    ANIONGAP 7 (L) 12/19/2022    BUN 15 12/22/2022    BUN 17 12/19/2022    CREATININE 0.9 12/22/2022    CREATININE 1.0 12/19/2022    GLU 91 12/22/2022    GLU 82 12/19/2022    CALCIUM 9.5 12/22/2022    CALCIUM 8.7 12/19/2022    MG 2.0 10/10/2022    MG 2.2 10/09/2022    ALBUMIN 4.6 12/22/2022    ALBUMIN 4.4 12/19/2022    PROT 7.9 12/22/2022    PROT 7.6 12/19/2022    ALKPHOS 48 (L) 12/22/2022    ALKPHOS 49 (L) 12/19/2022     (H) 12/22/2022     (H) 12/19/2022    AST 44 (H) 12/22/2022    AST 47 (H) 12/19/2022    BILITOT 0.6 12/22/2022    BILITOT 0.9 12/19/2022        CARDIAC PROFILE - LAST 2  Lab Results   Component Value Date    BNP 13 12/19/2022    BNP 15 09/22/2022     (H) 06/12/2020     (H) 06/12/2020    CPKMB 5.4 06/11/2020    TROPONINI 0.095 (H) 10/10/2022    TROPONINI <0.030 10/09/2022        COAGULATION - LAST 2  Lab Results   Component Value Date    LABPT 13.2 10/08/2022    LABPT 12.8 10/07/2022    INR 1.1 10/08/2022    INR 1.0 10/07/2022    APTT 58.5 (H) 10/09/2022    APTT 58.3 (H) 10/09/2022       ENDOCRINE & PSA - LAST 2  Lab Results   Component Value Date    TSH 1.24 09/22/2022    TSH 2.480 05/18/2021        ECHOCARDIOGRAM RESULTS  Results for orders placed during the hospital encounter of 10/07/22    Echo    Interpretation Summary  · The left ventricle is normal in size with mild concentric hypertrophy and normal systolic function.  · The estimated PA systolic pressure is 21 mmHg.  · Normal left ventricular diastolic function.  · Normal right ventricular size with normal right ventricular systolic  function.  · Normal central venous pressure (3 mmHg).  · Mild tricuspid regurgitation.  · The estimated ejection fraction is 55%.  · Mild left atrial enlargement.      CURRENT/PREVIOUS VISIT EKG  Results for orders placed or performed during the hospital encounter of 12/22/22   EKG 12-lead    Collection Time: 12/22/22  6:29 PM    Narrative    Test Reason : R10.13,    Vent. Rate : 084 BPM     Atrial Rate : 084 BPM     P-R Int : 102 ms          QRS Dur : 100 ms      QT Int : 380 ms       P-R-T Axes : 049 000 017 degrees     QTc Int : 449 ms    Sinus rhythm with short NE with occasional Premature ventricular complexes  Otherwise normal ECG  When compared with ECG of 10-OCT-2022 06:13,  Premature ventricular complexes are now Present  NE interval has decreased  Nonspecific T wave abnormality no longer evident in Lateral leads  QT has lengthened  Confirmed by David KNUTSON, Jose Rafael MURILLO ([id number]) on 12/23/2022 5:35:49 PM    Referred By: [email or web address]   SELF           Confirmed By:Jose Rafael Hernandez MD     No valid procedures specified.   Results for orders placed during the hospital encounter of 10/07/22    Nuclear Stress Test    Interpretation Summary    The EKG portion of this study is abnormal but not diagnostic.    The patient reported no chest pain during the stress test.    During stress, frequent PVCs are noted.    The nuclear portion of this study will be reported separately.    No valid procedures specified.    PHYSICAL EXAM  CONSTITUTIONAL: Well built, well nourished middle-aged male breathing comfortably in no apparent distress  NECK: no carotid bruit, no JVD  LUNGS: CTA  CHEST WALL: no tenderness  HEART: regular rate and rhythm, S1, S2 normal, no murmur, click, rub or gallop   ABDOMEN: soft, non-tender; bowel sounds normal; no masses,  no organomegaly  EXTREMITIES: Extremities normal, no edema, no calf tenderness noted  NEURO: AAO X 3    I HAVE REVIEWED :    The vital signs, nurses notes, and all the pertinent radiology and  labs.    Current Outpatient Medications   Medication Instructions    amitriptyline (ELAVIL) 25 mg, Oral, Nightly PRN    aspirin (ECOTRIN) 81 mg, Oral, Daily    buPROPion (WELLBUTRIN SR) 150 mg, Oral, 2 times daily    clopidogreL (PLAVIX) 75 mg, Oral, Daily    hydroCHLOROthiazide (HYDRODIURIL) 25 mg, Oral, Daily    isosorbide mononitrate (IMDUR) 30 mg, Oral, Daily    losartan (COZAAR) 50 mg, Oral, Daily    metoprolol succinate (TOPROL-XL) 75 mg, Oral, Nightly    rosuvastatin (CRESTOR) 20 mg, Oral, Nightly        Assessment & Plan     Mixed hyperlipidemia  LDL on 10/08/2022 was 62.6.  Patient was advised to continue Crestor 20 mg p.o. q.h.s. and adhere to a low-cholesterol low-carbohydrate diet.    Essential hypertension  Blood pressure is uncontrolled in the office today.  150/100 mm Hg.  Patient is on losartan 50 mg p.o. daily, Toprol 75 mg p.o. nightly, Imdur 30 mg p.o. q.h.s. 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  Discussed long-term effects of elevated blood pressure and importance of monitoring and controlling it.  Patient agrees to monitors blood pressure at home and report elevated readings.  He and his wife both state his blood pressure is usually 120 to 130/80.  I added hydrochlorothiazide 25 mg p.o. daily to his regimen  He was advised to adhere to a cardiac low-sodium diet and try to incorporate routine low-impact exercise    Ventricular trigeminy  Heart rate and rhythm are regular in the office today with no irregular beats noted.  He is had no palpitations, dizziness, syncope or chest pain    Coronary artery disease involving native coronary artery of native heart with unstable angina pectoris s/p PCI  Patient is status post PCI on 10/09/2022.  He is to continue aspirin 81 mg p.o. daily and Plavix 70 mg p.o. daily Imdur 30 mg p.o. daily.  He has had no chest pain and he is  trying to follow a low-sodium diet        No follow-ups on file.

## 2023-01-17 ENCOUNTER — OFFICE VISIT (OUTPATIENT)
Dept: FAMILY MEDICINE | Facility: CLINIC | Age: 38
End: 2023-01-17
Payer: COMMERCIAL

## 2023-01-17 VITALS
DIASTOLIC BLOOD PRESSURE: 84 MMHG | HEART RATE: 85 BPM | HEIGHT: 70 IN | WEIGHT: 218.31 LBS | TEMPERATURE: 98 F | BODY MASS INDEX: 31.25 KG/M2 | OXYGEN SATURATION: 97 % | RESPIRATION RATE: 18 BRPM | SYSTOLIC BLOOD PRESSURE: 136 MMHG

## 2023-01-17 DIAGNOSIS — R14.0 ABDOMINAL DISTENSION: ICD-10-CM

## 2023-01-17 DIAGNOSIS — R10.11 RIGHT UPPER QUADRANT ABDOMINAL PAIN: Primary | ICD-10-CM

## 2023-01-17 PROCEDURE — 99213 OFFICE O/P EST LOW 20 MIN: CPT | Mod: S$GLB,,, | Performed by: NURSE PRACTITIONER

## 2023-01-17 PROCEDURE — 3079F PR MOST RECENT DIASTOLIC BLOOD PRESSURE 80-89 MM HG: ICD-10-PCS | Mod: CPTII,S$GLB,, | Performed by: NURSE PRACTITIONER

## 2023-01-17 PROCEDURE — 99213 PR OFFICE/OUTPT VISIT, EST, LEVL III, 20-29 MIN: ICD-10-PCS | Mod: S$GLB,,, | Performed by: NURSE PRACTITIONER

## 2023-01-17 PROCEDURE — 1159F MED LIST DOCD IN RCRD: CPT | Mod: CPTII,S$GLB,, | Performed by: NURSE PRACTITIONER

## 2023-01-17 PROCEDURE — 1159F PR MEDICATION LIST DOCUMENTED IN MEDICAL RECORD: ICD-10-PCS | Mod: CPTII,S$GLB,, | Performed by: NURSE PRACTITIONER

## 2023-01-17 PROCEDURE — 3075F PR MOST RECENT SYSTOLIC BLOOD PRESS GE 130-139MM HG: ICD-10-PCS | Mod: CPTII,S$GLB,, | Performed by: NURSE PRACTITIONER

## 2023-01-17 PROCEDURE — 3079F DIAST BP 80-89 MM HG: CPT | Mod: CPTII,S$GLB,, | Performed by: NURSE PRACTITIONER

## 2023-01-17 PROCEDURE — 3008F PR BODY MASS INDEX (BMI) DOCUMENTED: ICD-10-PCS | Mod: CPTII,S$GLB,, | Performed by: NURSE PRACTITIONER

## 2023-01-17 PROCEDURE — 1160F RVW MEDS BY RX/DR IN RCRD: CPT | Mod: CPTII,S$GLB,, | Performed by: NURSE PRACTITIONER

## 2023-01-17 PROCEDURE — 1160F PR REVIEW ALL MEDS BY PRESCRIBER/CLIN PHARMACIST DOCUMENTED: ICD-10-PCS | Mod: CPTII,S$GLB,, | Performed by: NURSE PRACTITIONER

## 2023-01-17 PROCEDURE — 3075F SYST BP GE 130 - 139MM HG: CPT | Mod: CPTII,S$GLB,, | Performed by: NURSE PRACTITIONER

## 2023-01-17 PROCEDURE — 3008F BODY MASS INDEX DOCD: CPT | Mod: CPTII,S$GLB,, | Performed by: NURSE PRACTITIONER

## 2023-01-17 RX ORDER — PANTOPRAZOLE SODIUM 20 MG/1
20 TABLET, DELAYED RELEASE ORAL
Qty: 90 TABLET | Refills: 1 | Status: SHIPPED | OUTPATIENT
Start: 2023-01-17 | End: 2023-10-18 | Stop reason: SDUPTHER

## 2023-01-17 NOTE — PROGRESS NOTES
SUBJECTIVE:      Patient ID: Irving Tanner is a 37 y.o. male.    Chief Complaint: Hypertension (F/u), Hyperlipidemia (F/u), and Abdominal Pain (F/u)    Pt presents for F/U HTN, lipids, and abdominal pain. This is a 37-year-old M with previous mHx of HTN, cigarette smoking, HLD, ventricular trigeminy, insomnia, and CAD S/P PCI. Last visit, he reported RUQ abdominal pain with bloating and weight gain. Denies melena, hemochezia, nausea, vomiting, diarrhea, or constipation. He has been eating and drinking ok. He feels it is a burning type ache which worsens with eating. He had labs and an US which was unremarkable except for elevated LFTs and fatty liver, hepatomegaly. He was referred to GI but then presented to the ER on 12/22/2022 due to worsening symptoms. Labs were unremarkable again. CT was also unremarkable. They discharged him with another GI referral and carafate prescription. He hasn't yet made the GI appointment reporting it was too far out. Denies CP, SOB, wheezing, fevers, nausea, vomiting, diarrhea, constipation, numbness, weakness, dizziness, palpitations, or any other concerns at this time.    Abdominal Pain  This is a chronic problem. The current episode started more than 1 month ago. The onset quality is gradual. The problem occurs constantly. The problem has been waxing and waning. The pain is located in the RUQ. The pain is moderate. The quality of the pain is colicky and dull. The abdominal pain does not radiate. Pertinent negatives include no anorexia, arthralgias, belching, constipation, diarrhea, dysuria, fever, flatus, frequency, headaches, hematochezia, hematuria, melena, myalgias, nausea, vomiting or weight loss. The pain is aggravated by eating. The pain is relieved by Nothing. Treatments tried: carafate. The treatment provided no relief. Prior diagnostic workup includes ultrasound and CT scan. There is no history of abdominal surgery, colon cancer, Crohn's disease, gallstones, GERD,  irritable bowel syndrome, pancreatitis, PUD or ulcerative colitis. Patient's medical history does not include kidney stones and UTI.   Past Surgical History:   Procedure Laterality Date    APPENDECTOMY      FOOT FRACTURE SURGERY      FRACTURE SURGERY      Incision and Drainage of Perirectal Abscess  06/22/2018        LEFT HEART CATHETERIZATION Left 10/09/2022    Procedure: Left heart cath;  Surgeon: Jose Rafael Hernandez MD;  Location: [facility] CATH/EP LAB;  Service: Cardiology;  Laterality: Left;     Family History   Problem Relation Age of Onset    Hypertension Mother     Diabetes Mother     Thyroid disease Mother     No Known Problems Father       Social History     Socioeconomic History    Marital status:     Number of children: 1   Tobacco Use    Smoking status: Every Day     Packs/day: 1.00     Years: 15.00     Pack years: 15.00     Types: Cigarettes    Smokeless tobacco: Never   Substance and Sexual Activity    Alcohol use: No     Comment: occasionally    Drug use: No    Sexual activity: Yes     Partners: Female     Birth control/protection: None     Current Outpatient Medications   Medication Sig Dispense Refill    amitriptyline (ELAVIL) 25 MG tablet Take 1 tablet (25 mg total) by mouth nightly as needed for Insomnia. 90 tablet 1    aspirin (ECOTRIN) 81 MG EC tablet Take 1 tablet (81 mg total) by mouth once daily. 90 tablet 0    buPROPion (WELLBUTRIN SR) 150 MG TBSR 12 hr tablet Take 1 tablet (150 mg total) by mouth 2 (two) times daily. 180 tablet 2    clopidogreL (PLAVIX) 75 mg tablet Take 1 tablet (75 mg total) by mouth once daily. 90 tablet 3    hydroCHLOROthiazide (HYDRODIURIL) 25 MG tablet Take 1 tablet (25 mg total) by mouth once daily. 90 tablet 3    isosorbide mononitrate (IMDUR) 30 MG 24 hr tablet Take 1 tablet (30 mg total) by mouth once daily. 90 tablet 3    losartan (COZAAR) 50 MG tablet Take 1 tablet (50 mg total) by mouth once daily. 90 tablet 0    metoprolol succinate (TOPROL-XL) 25  MG 24 hr tablet Take 3 tablets (75 mg total) by mouth every evening. 270 tablet 0    rosuvastatin (CRESTOR) 20 MG tablet Take 1 tablet (20 mg total) by mouth every evening. 90 tablet 0    pantoprazole (PROTONIX) 20 MG tablet Take 1 tablet (20 mg total) by mouth before breakfast. 90 tablet 1     No current facility-administered medications for this visit.     Review of patient's allergies indicates:   Allergen Reactions    Motrin [ibuprofen]      swelling      Past Medical History:   Diagnosis Date    Cigarette nicotine dependence with nicotine-induced disorder 9/7/2022    Hyperlipidemia     Hypertension     Secondary polycythemia 11/6/2022     Past Surgical History:   Procedure Laterality Date    APPENDECTOMY      FOOT FRACTURE SURGERY      FRACTURE SURGERY      Incision and Drainage of Perirectal Abscess  06/22/2018        LEFT HEART CATHETERIZATION Left 10/09/2022    Procedure: Left heart cath;  Surgeon: Jose Rafael Hernandez MD;  Location: [facility] CATH/EP LAB;  Service: Cardiology;  Laterality: Left;       Review of Systems   Constitutional:  Positive for unexpected weight change. Negative for activity change, appetite change, chills, fatigue, fever and weight loss.   HENT:  Negative for congestion, ear pain, hearing loss, mouth sores, nosebleeds, postnasal drip, rhinorrhea, sinus pressure, sinus pain, sneezing, sore throat and trouble swallowing.    Eyes:  Negative for pain, discharge and visual disturbance.   Respiratory:  Negative for apnea, cough, chest tightness, shortness of breath, wheezing and stridor.    Cardiovascular:  Negative for chest pain, palpitations and leg swelling.   Gastrointestinal:  Positive for abdominal pain. Negative for anorexia, blood in stool, constipation, diarrhea, flatus, hematochezia, melena, nausea and vomiting.   Endocrine: Negative for polydipsia and polyuria.   Genitourinary:  Negative for difficulty urinating, dysuria, flank pain, frequency, hematuria and urgency.  "  Musculoskeletal:  Negative for arthralgias, joint swelling, myalgias, neck pain and neck stiffness.   Skin:  Negative for color change, rash and wound.   Neurological:  Negative for dizziness, tremors, seizures, syncope, weakness, light-headedness, numbness and headaches.   Hematological:  Negative for adenopathy.   Psychiatric/Behavioral:  Negative for confusion, dysphoric mood, sleep disturbance and suicidal ideas. The patient is not nervous/anxious.     OBJECTIVE:      Vitals:    01/17/23 1413   BP: 136/84   Pulse: 85   Resp: 18   Temp: 98.3 °F (36.8 °C)   SpO2: 97%   Weight: 99 kg (218 lb 4.8 oz)   Height: 5' 10" (1.778 m)     Physical Exam  Vitals reviewed.   Constitutional:       General: He is not in acute distress.     Appearance: Normal appearance. He is well-developed. He is obese. He is not diaphoretic.   HENT:      Head: Normocephalic and atraumatic.      Right Ear: Hearing and external ear normal.      Left Ear: Hearing and external ear normal.      Nose: Nose normal.      Mouth/Throat:      Lips: Pink.      Mouth: Mucous membranes are moist.   Eyes:      General: Lids are normal. No scleral icterus.        Right eye: No discharge.         Left eye: No discharge.      Extraocular Movements: Extraocular movements intact.      Conjunctiva/sclera: Conjunctivae normal.      Pupils: Pupils are equal, round, and reactive to light.   Neck:      Thyroid: No thyroid mass or thyromegaly.      Vascular: No carotid bruit.      Trachea: Trachea and phonation normal. No tracheal deviation.   Cardiovascular:      Rate and Rhythm: Normal rate and regular rhythm.      Pulses: Normal pulses.      Heart sounds: Normal heart sounds. No murmur heard.    No friction rub. No gallop.   Pulmonary:      Effort: Pulmonary effort is normal. No respiratory distress.      Breath sounds: Normal breath sounds. No stridor. No decreased breath sounds, wheezing, rhonchi or rales.   Abdominal:      General: Bowel sounds are normal. " There is distension.      Palpations: Abdomen is soft. There is no hepatomegaly, splenomegaly or mass.      Tenderness: There is abdominal tenderness in the right lower quadrant. There is no guarding or rebound. Positive signs include Bermudez's sign.      Hernia: No hernia is present.   Musculoskeletal:         General: Normal range of motion.      Cervical back: Full passive range of motion without pain, normal range of motion and neck supple.      Right lower leg: No edema.      Left lower leg: No edema.   Lymphadenopathy:      Cervical: No cervical adenopathy.      Upper Body:      Right upper body: No supraclavicular adenopathy.      Left upper body: No supraclavicular adenopathy.   Skin:     General: Skin is warm and dry.      Capillary Refill: Capillary refill takes less than 2 seconds.      Findings: No rash.   Neurological:      General: No focal deficit present.      Mental Status: He is alert and oriented to person, place, and time.      Coordination: Coordination is intact.      Gait: Gait is intact.   Psychiatric:         Attention and Perception: Attention and perception normal.         Mood and Affect: Mood and affect normal.         Speech: Speech normal.         Behavior: Behavior normal. Behavior is cooperative.         Thought Content: Thought content normal. Thought content does not include suicidal plan.         Cognition and Memory: Cognition and memory normal.         Judgment: Judgment normal.      Assessment:       1. Right upper quadrant abdominal pain    2. Abdominal distension        Plan:       Right upper quadrant abdominal pain  US and CT scan both unremarkable except for fatty liver. He does have mildly elevated LFTs which we discussed today. Recommend low fat diet, exercise, and weight loss to aid in the fatty liver finding. Denies ETOH use. Recommend continuing with referral to GI (Dr. Bone) for further evaluation of the RUQ pain.  -     pantoprazole (PROTONIX) 20 MG tablet; Take  1 tablet (20 mg total) by mouth before breakfast.  Dispense: 90 tablet; Refill: 1    Abdominal distension  -     pantoprazole (PROTONIX) 20 MG tablet; Take 1 tablet (20 mg total) by mouth before breakfast.  Dispense: 90 tablet; Refill: 1        Follow up in about 3 months (around 4/17/2023) for F/U HTN, abd pain.      1/18/2023 AGA Avalos, FNP

## 2023-02-03 NOTE — ED PROVIDER NOTES
Encounter Date: 6/12/2020    SCRIBE #1 NOTE: I, Charlotte Castaneda, am scribing for, and in the presence of, Prasanht Diehl MD.       History     Chief Complaint   Patient presents with    Abdominal Pain     right side     Flank Pain     right       Time seen by provider: 5:32 PM on 06/12/2020    Irving Tanner is a 35 y.o. male with no PMHx who presents to the ED with an onset of right sided abdominal and flank pain x5 days that has gotten progressively worse. The patient states the pain is now constant, but at first it was on and off. The patient was seen at [facility] yesterday and had an extensive workup, then left AMA. [facility] completed labs that showed elevated CPK which improved with fluids, and thought it was due to possible dehydration. The patient admits it's not heat exhaustion related due to only working out in the sun the last two days. The patient denies any history of this pain. The patient denies fever, nausea, vomiting, diarrhea or any other symptoms at this time. The patient has a PSHx of appendectomy. Drug allergy to ibuprofen.           The history is provided by the patient.     Review of patient's allergies indicates:   Allergen Reactions    Motrin [ibuprofen]      swelling     History reviewed. No pertinent past medical history.  Past Surgical History:   Procedure Laterality Date    APPENDECTOMY      FOOT FRACTURE SURGERY      Incision and Drainage of Perirectal Abscess  06/22/2018         No family history on file.  Social History     Tobacco Use    Smoking status: Current Every Day Smoker     Packs/day: 0.50   Substance Use Topics    Alcohol use: No     Comment: occasionally    Drug use: No     Review of Systems   Constitutional: Negative for fever.   HENT: Negative for sore throat.    Respiratory: Negative for shortness of breath.    Cardiovascular: Negative for chest pain.   Gastrointestinal: Positive for abdominal pain. Negative for diarrhea, nausea and vomiting.    Genitourinary: Positive for flank pain. Negative for dysuria.   Musculoskeletal: Negative for back pain.   Skin: Negative for rash.   Neurological: Negative for weakness.   Hematological: Does not bruise/bleed easily.       Physical Exam     Initial Vitals [06/12/20 1535]   BP Pulse Resp Temp SpO2   116/86 70 18 98.2 °F (36.8 °C) 99 %      MAP       --         Physical Exam    Nursing note and vitals reviewed.  Constitutional: He appears well-developed and well-nourished. He is not diaphoretic. No distress.   HENT:   Head: Normocephalic and atraumatic.   Mouth/Throat: Oropharynx is clear and moist.   Eyes: Conjunctivae are normal.   Neck: Neck supple.   Cardiovascular: Normal rate, regular rhythm, normal heart sounds and intact distal pulses. Exam reveals no gallop and no friction rub.    No murmur heard.  Pulmonary/Chest: Breath sounds normal. He has no wheezes. He has no rhonchi. He has no rales.   Right chest wall tenderness.   Abdominal: Soft. He exhibits no distension. There is tenderness in the right upper quadrant and right lower quadrant. There is guarding. There is no rigidity and no rebound.   Voluntary guarding.   Musculoskeletal: Normal range of motion.   Neurological: He is alert and oriented to person, place, and time.   Skin: No rash noted. No erythema.         ED Course   Procedures  Labs Reviewed   CBC W/ AUTO DIFFERENTIAL - Abnormal; Notable for the following components:       Result Value    Mean Corpuscular Hemoglobin 31.7 (*)     Gran # (ANC) 8.9 (*)     Gran% 73.1 (*)     All other components within normal limits   COMPREHENSIVE METABOLIC PANEL - Abnormal; Notable for the following components:    CO2 22 (*)     Alkaline Phosphatase 54 (*)     All other components within normal limits   CK - Abnormal; Notable for the following components:     (*)     All other components within normal limits          Imaging Results    None          Medical Decision Making:   History:   Old Medical  Records: I decided to obtain old medical records.  Clinical Tests:   Lab Tests: Ordered and Reviewed            Scribe Attestation:   Scribe #1: I performed the above scribed service and the documentation accurately describes the services I performed. I attest to the accuracy of the note.    I, Dr. Prasanth Diehl, personally performed the services described in this documentation. All medical record entries made by the scribe were at my direction and in my presence.  I have reviewed the chart and agree that the record reflects my personal performance and is accurate and complete. Prasanth Diehl MD.  6:45 PM 06/12/2020    Irving Tanner is a 35 y.o. male presenting with right upper abdominal pain.  He had extensive workup yesterday showing mild elevation of CPK.  I do not think this was consistent with rhabdomyolysis and is trending down even further today.  I do not think he requires admission.  He is tolerating fluids well by mouth.  Pain is somewhat worsened with eating and he has a history of peptic ulcer disease in the remote past.  I encouraged daily Prilosec as he has done in the past.  Bedside gallbladder ultrasound shows no gallstones or sign of cholecystitis concordant with CT yesterday.  I do not think repeat imaging or surgical consultation is indicated.  Pain is improved with acetaminophen and Maalox here.  I have very low suspicion for acute, life-threatening process such as appendicitis as he has had prior appendectomy.  I doubt perforated viscus, obstruction, abscess.  He has no concerning leukocytosis today resolved from yesterday.  I do not think empiric antibiotics are indicated.  Follow-up with PCP with discussion of GI follow-up to follow.  Detailed return precautions reviewed.        ED Course as of Jun 12 1848 Fri Jun 12, 2020   1754 Bedside RUQ US:  Normal-appearing gallbladder with no distention, wall thickening, gallstones, or pericholecystic fluid or inflammation.      [MR]     [Chaperone Present] : A chaperone was present in the examining room during all aspects of the physical examination   ED Course User Index  [MR] Prasanth Diehl MD                Clinical Impression:       ICD-10-CM ICD-9-CM   1. RUQ abdominal pain R10.11 789.01             ED Disposition Condition    Discharge Stable        ED Prescriptions     None        Follow-up Information     Follow up With Specialties Details Why Contact Info    Your PCP, or see list, next week                                         Prasanth Diehl MD  06/12/20 4475     [Appropriately responsive] : appropriately responsive [Soft] : soft [Non-tender] : non-tender [Non-distended] : non-distended [No Lesions] : no lesions [No Mass] : no mass [Oriented x3] : oriented x3 [Examination Of The Breasts] : a normal appearance [No Masses] : no breast masses were palpable [Labia Majora] : normal [Labia Minora] : normal [Normal] : normal [Enlarged ___ wks] : enlarged [unfilled] ~Uweeks [Anteversion] : anteverted [Uterine Adnexae] : normal [Tenderness] : nontender [FreeTextEntry5] : deviated very anteriorly [FreeTextEntry6] : +myoma palpable in vagina corresponding to pt's pain

## 2023-04-18 ENCOUNTER — OFFICE VISIT (OUTPATIENT)
Dept: FAMILY MEDICINE | Facility: CLINIC | Age: 38
End: 2023-04-18
Payer: COMMERCIAL

## 2023-04-18 VITALS
OXYGEN SATURATION: 98 % | HEART RATE: 106 BPM | BODY MASS INDEX: 33.95 KG/M2 | HEIGHT: 70 IN | SYSTOLIC BLOOD PRESSURE: 138 MMHG | WEIGHT: 237.13 LBS | DIASTOLIC BLOOD PRESSURE: 86 MMHG | TEMPERATURE: 98 F

## 2023-04-18 DIAGNOSIS — I25.110 CORONARY ARTERY DISEASE INVOLVING NATIVE CORONARY ARTERY OF NATIVE HEART WITH UNSTABLE ANGINA PECTORIS: ICD-10-CM

## 2023-04-18 DIAGNOSIS — E78.2 MIXED HYPERLIPIDEMIA: ICD-10-CM

## 2023-04-18 DIAGNOSIS — F51.01 PRIMARY INSOMNIA: ICD-10-CM

## 2023-04-18 DIAGNOSIS — K76.0 FATTY LIVER: ICD-10-CM

## 2023-04-18 DIAGNOSIS — I10 ESSENTIAL HYPERTENSION: Primary | ICD-10-CM

## 2023-04-18 DIAGNOSIS — Z13.1 SCREENING FOR DIABETES MELLITUS: ICD-10-CM

## 2023-04-18 PROCEDURE — 1159F MED LIST DOCD IN RCRD: CPT | Mod: CPTII,S$GLB,, | Performed by: NURSE PRACTITIONER

## 2023-04-18 PROCEDURE — 3075F SYST BP GE 130 - 139MM HG: CPT | Mod: CPTII,S$GLB,, | Performed by: NURSE PRACTITIONER

## 2023-04-18 PROCEDURE — 4010F PR ACE/ARB THEARPY RXD/TAKEN: ICD-10-PCS | Mod: CPTII,S$GLB,, | Performed by: NURSE PRACTITIONER

## 2023-04-18 PROCEDURE — 4010F ACE/ARB THERAPY RXD/TAKEN: CPT | Mod: CPTII,S$GLB,, | Performed by: NURSE PRACTITIONER

## 2023-04-18 PROCEDURE — 99214 PR OFFICE/OUTPT VISIT, EST, LEVL IV, 30-39 MIN: ICD-10-PCS | Mod: S$GLB,,, | Performed by: NURSE PRACTITIONER

## 2023-04-18 PROCEDURE — 3079F DIAST BP 80-89 MM HG: CPT | Mod: CPTII,S$GLB,, | Performed by: NURSE PRACTITIONER

## 2023-04-18 PROCEDURE — 3008F BODY MASS INDEX DOCD: CPT | Mod: CPTII,S$GLB,, | Performed by: NURSE PRACTITIONER

## 2023-04-18 PROCEDURE — 3075F PR MOST RECENT SYSTOLIC BLOOD PRESS GE 130-139MM HG: ICD-10-PCS | Mod: CPTII,S$GLB,, | Performed by: NURSE PRACTITIONER

## 2023-04-18 PROCEDURE — 1160F PR REVIEW ALL MEDS BY PRESCRIBER/CLIN PHARMACIST DOCUMENTED: ICD-10-PCS | Mod: CPTII,S$GLB,, | Performed by: NURSE PRACTITIONER

## 2023-04-18 PROCEDURE — 1160F RVW MEDS BY RX/DR IN RCRD: CPT | Mod: CPTII,S$GLB,, | Performed by: NURSE PRACTITIONER

## 2023-04-18 PROCEDURE — 99214 OFFICE O/P EST MOD 30 MIN: CPT | Mod: S$GLB,,, | Performed by: NURSE PRACTITIONER

## 2023-04-18 PROCEDURE — 3079F PR MOST RECENT DIASTOLIC BLOOD PRESSURE 80-89 MM HG: ICD-10-PCS | Mod: CPTII,S$GLB,, | Performed by: NURSE PRACTITIONER

## 2023-04-18 PROCEDURE — 1159F PR MEDICATION LIST DOCUMENTED IN MEDICAL RECORD: ICD-10-PCS | Mod: CPTII,S$GLB,, | Performed by: NURSE PRACTITIONER

## 2023-04-18 PROCEDURE — 3008F PR BODY MASS INDEX (BMI) DOCUMENTED: ICD-10-PCS | Mod: CPTII,S$GLB,, | Performed by: NURSE PRACTITIONER

## 2023-04-18 RX ORDER — TRAZODONE HYDROCHLORIDE 100 MG/1
100 TABLET ORAL NIGHTLY
Qty: 90 TABLET | Refills: 1 | Status: SHIPPED | OUTPATIENT
Start: 2023-04-18 | End: 2023-09-04 | Stop reason: SDUPTHER

## 2023-04-18 RX ORDER — ROSUVASTATIN CALCIUM 20 MG/1
20 TABLET, COATED ORAL NIGHTLY
Qty: 90 TABLET | Refills: 1 | Status: SHIPPED | OUTPATIENT
Start: 2023-04-18 | End: 2023-08-17 | Stop reason: SDUPTHER

## 2023-04-18 NOTE — PROGRESS NOTES
SUBJECTIVE:      Patient ID: Irving Tanner is a 38 y.o. male.    Chief Complaint: Follow-up, Hypertension, Hyperlipidemia, and Insomnia    38-year-old male with a history of hypertension, hyperlipidemia, and CAD presents to the clinic for hypertension, hyperlipidemia, and insomnia follow-up.    He is s/p left heart catheterization and received stent in LAD and RCA in October 2022.  His cardiac regimen was adjusted. He was started on dual antiplatelets with ASA and Plavix.  He also takes Imdur 30 mg.  He is established with the Cardiologist Dr. Hernandez.     Blood pressure stable with HCTZ 25 mg, metoprolol succinate 75 mg daily, and Losartan 50 mg.      Lipid panel in October was stable, LDL 62.6, triglycerides elevated at 232, cholesterol managed with Crestor 20 mg.  Liver enzymes were elevated, AST 44 and . He has fatty liver noted on imaging. He was referred to GI by his former PCP.     He is on Elavil 25 mg for insomnia, but would like to switch back to Trazodone 100 mg as he feels it works better.     He does reports some increased weight gain s/p heart cath. He reports eating healthier with lower sodium.  He is on Elavil, which can cause weight gain.       Family History   Problem Relation Age of Onset    Hypertension Mother     Diabetes Mother     Thyroid disease Mother     No Known Problems Father       Social History     Socioeconomic History    Marital status:     Number of children: 1   Tobacco Use    Smoking status: Former     Packs/day: 1.00     Years: 15.00     Pack years: 15.00     Types: Cigarettes    Smokeless tobacco: Never   Substance and Sexual Activity    Alcohol use: No     Comment: occasionally    Drug use: No    Sexual activity: Yes     Partners: Female     Birth control/protection: None     Current Outpatient Medications   Medication Sig Dispense Refill    hydroCHLOROthiazide (HYDRODIURIL) 25 MG tablet Take 1 tablet (25 mg total) by mouth once daily. 90 tablet 3     isosorbide mononitrate (IMDUR) 30 MG 24 hr tablet Take 1 tablet (30 mg total) by mouth once daily. 90 tablet 3    losartan (COZAAR) 50 MG tablet TAKE 1 TABLET(50 MG) BY MOUTH EVERY DAY 90 tablet 0    metoprolol succinate (TOPROL-XL) 25 MG 24 hr tablet TAKE 3 TABLETS(75 MG) BY MOUTH EVERY EVENING 270 tablet 0    pantoprazole (PROTONIX) 20 MG tablet Take 1 tablet (20 mg total) by mouth before breakfast. 90 tablet 1    aspirin (ECOTRIN) 81 MG EC tablet Take 1 tablet (81 mg total) by mouth once daily. 90 tablet 0    clopidogreL (PLAVIX) 75 mg tablet Take 1 tablet (75 mg total) by mouth once daily. 90 tablet 3    rosuvastatin (CRESTOR) 20 MG tablet Take 1 tablet (20 mg total) by mouth every evening. 90 tablet 1    traZODone (DESYREL) 100 MG tablet Take 1 tablet (100 mg total) by mouth every evening. 90 tablet 1     No current facility-administered medications for this visit.     Review of patient's allergies indicates:   Allergen Reactions    Motrin [ibuprofen]      swelling      Past Medical History:   Diagnosis Date    Cigarette nicotine dependence with nicotine-induced disorder 9/7/2022    Hyperlipidemia     Hypertension     Secondary polycythemia 11/6/2022     Past Surgical History:   Procedure Laterality Date    APPENDECTOMY      FOOT FRACTURE SURGERY      FRACTURE SURGERY      Incision and Drainage of Perirectal Abscess  06/22/2018        LEFT HEART CATHETERIZATION Left 10/09/2022    Procedure: Left heart cath;  Surgeon: Jose Rafael Hernandez MD;  Location: [facility] CATH/EP LAB;  Service: Cardiology;  Laterality: Left;       Review of Systems   Constitutional:  Positive for activity change and unexpected weight change. Negative for appetite change, chills, diaphoresis, fatigue and fever.   HENT:  Negative for congestion, ear pain, hearing loss, rhinorrhea, sinus pressure, sore throat, trouble swallowing and voice change.    Eyes:  Negative for pain, discharge and visual disturbance.   Respiratory:  Negative for  "cough, chest tightness, shortness of breath and wheezing.    Cardiovascular:  Negative for chest pain and palpitations.   Gastrointestinal:  Negative for abdominal pain, blood in stool, constipation, diarrhea, nausea and vomiting.   Endocrine: Negative for polydipsia and polyuria.   Genitourinary:  Negative for difficulty urinating, flank pain, frequency, hematuria and urgency.   Musculoskeletal:  Positive for arthralgias and joint swelling. Negative for back pain and neck pain.   Skin:  Negative for color change and rash.   Neurological:  Negative for dizziness, seizures, syncope, weakness, numbness and headaches.   Hematological:  Negative for adenopathy.   Psychiatric/Behavioral:  Negative for confusion, dysphoric mood and sleep disturbance. The patient is not nervous/anxious.     OBJECTIVE:      Vitals:    04/18/23 1349   BP: 138/86   BP Location: Left arm   Patient Position: Sitting   BP Method: Medium (Manual)   Pulse: 106   Temp: 98.3 °F (36.8 °C)   TempSrc: Oral   SpO2: 98%   Weight: 107.5 kg (237 lb 1.6 oz)   Height: 5' 10" (1.778 m)     Physical Exam  Vitals and nursing note reviewed.   Constitutional:       General: He is awake. He is not in acute distress.     Appearance: Normal appearance. He is obese. He is not ill-appearing, toxic-appearing or diaphoretic.   HENT:      Head: Normocephalic and atraumatic.      Right Ear: Tympanic membrane, ear canal and external ear normal. There is no impacted cerumen.      Left Ear: Tympanic membrane, ear canal and external ear normal. There is no impacted cerumen.      Nose: Nose normal.   Eyes:      General: Lids are normal. Gaze aligned appropriately.      Conjunctiva/sclera: Conjunctivae normal.      Right eye: Right conjunctiva is not injected.      Left eye: Left conjunctiva is not injected.      Pupils: Pupils are equal, round, and reactive to light.   Cardiovascular:      Rate and Rhythm: Normal rate and regular rhythm.      Pulses: Normal pulses.      Heart " sounds: Normal heart sounds, S1 normal and S2 normal. No murmur heard.    No friction rub. No gallop.   Pulmonary:      Effort: Pulmonary effort is normal. No respiratory distress.      Breath sounds: Normal breath sounds. No stridor. No decreased breath sounds, wheezing, rhonchi or rales.   Chest:      Chest wall: No tenderness.   Musculoskeletal:      Cervical back: Neck supple.      Right lower leg: No edema.      Left lower leg: No edema.   Lymphadenopathy:      Cervical: No cervical adenopathy.   Skin:     General: Skin is warm and dry.      Capillary Refill: Capillary refill takes less than 2 seconds.      Findings: No erythema or rash.   Neurological:      Mental Status: He is alert and oriented to person, place, and time. Mental status is at baseline.   Psychiatric:         Attention and Perception: Attention normal.         Mood and Affect: Mood normal.         Speech: Speech normal.         Behavior: Behavior normal. Behavior is cooperative.         Thought Content: Thought content normal.         Judgment: Judgment normal.      Assessment:       1. Essential hypertension    2. Mixed hyperlipidemia    3. Primary insomnia    4. Coronary artery disease involving native coronary artery of native heart with unstable angina pectoris    5. Fatty liver    6. Screening for diabetes mellitus        Plan:       Essential hypertension  BP borderline with HCTZ 25 mg, metoprolol succinate 75 mg daily, and Losartan 50 mg. Reduce the amount of salt in your diet; Lose weight; Avoid drinking too much alcohol; Exercise at least 30 minutes per day most days of the week.  Continue current medications and home BP monitoring.  -     Comprehensive Metabolic Panel; Future; Expected date: 04/18/2023  -     Lipid Panel; Future; Expected date: 04/18/2023  -     Microalbumin/Creatinine Ratio, Urine; Future; Expected date: 04/18/2023  -     Urinalysis; Future; Expected date: 04/18/2023    Mixed hyperlipidemia  Stable with Crestor 20  mg.  Continue current treatment. Limit red meat, butter, fried foods, cheese, and other foods that have a lot of saturated fat. Consume more: lean meats, fish, fruits, vegetables, whole grains, beans, lentils, and nuts.  Weight loss, and 30-45 min of cardiovascular exercise daily.  -     rosuvastatin (CRESTOR) 20 MG tablet; Take 1 tablet (20 mg total) by mouth every evening.  Dispense: 90 tablet; Refill: 1  -     Comprehensive Metabolic Panel; Future; Expected date: 04/18/2023  -     Lipid Panel; Future; Expected date: 04/18/2023  -     TSH; Future; Expected date: 04/18/2023    Primary insomnia  Stop Elavil.  Restart Trazodone.    -     traZODone (DESYREL) 100 MG tablet; Take 1 tablet (100 mg total) by mouth every evening.  Dispense: 90 tablet; Refill: 1    Coronary artery disease involving native coronary artery of native heart with unstable angina pectoris  Managed by Cardiology.  Continue ASA, Crestor, and Imdur.    -     CBC Auto Differential; Future; Expected date: 04/18/2023  -     Comprehensive Metabolic Panel; Future; Expected date: 04/18/2023  -     Lipid Panel; Future; Expected date: 04/18/2023  -     TSH; Future; Expected date: 04/18/2023    Fatty liver  Too much sugar is as damaging to the liver as too much alcohol. Too much sugar is the primary thing that turns to fat in the liver. Eating excess white flour, white rice and potatoes is almost the same as eating sugar. Saturated fats (e.g. milk fat, red meat fat, etc.) cause inflammation which is bad for the liver. Lose weight. Exercise and be more active. Aim for at least 30 minutes of exercise most days of the week.   -     Comprehensive Metabolic Panel; Future; Expected date: 04/18/2023    Screening for diabetes mellitus  -     Comprehensive Metabolic Panel; Future; Expected date: 04/18/2023  -     Hemoglobin A1C; Future; Expected date: 04/18/2023    This note was created using [employer] voice recognition software that occasionally misinterprets phrases  or words.     I spent a total of 33 minutes on the day of the visit.This includes face to face time and non-face to face time preparing to see the patient (eg, review of tests), obtaining and/or reviewing separately obtained history, documenting clinical information in the electronic or other health record, independently interpreting results and communicating results to the patient/family/caregiver, or care coordinator.    Follow up in about 6 months (around 10/18/2023) for HTN, HLD, Insomnia.      4/18/2023 AGA Maradiaga, NAVINP

## 2023-06-30 DIAGNOSIS — I10 ESSENTIAL HYPERTENSION: ICD-10-CM

## 2023-06-30 NOTE — TELEPHONE ENCOUNTER
A user error has taken place: encounter opened in error, closed for administrative reasons.     APPT. 10-18-23, LOV. 4-18-23.

## 2023-07-02 RX ORDER — LOSARTAN POTASSIUM 50 MG/1
TABLET ORAL
Qty: 90 TABLET | Refills: 1 | Status: SHIPPED | OUTPATIENT
Start: 2023-07-02 | End: 2023-11-07 | Stop reason: SDUPTHER

## 2023-07-02 RX ORDER — METOPROLOL SUCCINATE 25 MG/1
TABLET, EXTENDED RELEASE ORAL
Qty: 270 TABLET | Refills: 1 | Status: SHIPPED | OUTPATIENT
Start: 2023-07-02 | End: 2023-08-17

## 2023-07-10 ENCOUNTER — PATIENT MESSAGE (OUTPATIENT)
Dept: CARDIOLOGY | Facility: CLINIC | Age: 38
End: 2023-07-10
Payer: COMMERCIAL

## 2023-08-17 ENCOUNTER — OFFICE VISIT (OUTPATIENT)
Dept: CARDIOLOGY | Facility: CLINIC | Age: 38
End: 2023-08-17
Payer: COMMERCIAL

## 2023-08-17 VITALS
HEART RATE: 76 BPM | DIASTOLIC BLOOD PRESSURE: 70 MMHG | BODY MASS INDEX: 32.51 KG/M2 | SYSTOLIC BLOOD PRESSURE: 122 MMHG | HEIGHT: 70 IN | WEIGHT: 227.06 LBS

## 2023-08-17 DIAGNOSIS — F17.219 CIGARETTE NICOTINE DEPENDENCE WITH NICOTINE-INDUCED DISORDER: ICD-10-CM

## 2023-08-17 DIAGNOSIS — I10 ESSENTIAL HYPERTENSION: ICD-10-CM

## 2023-08-17 DIAGNOSIS — I25.110 CORONARY ARTERY DISEASE INVOLVING NATIVE CORONARY ARTERY OF NATIVE HEART WITH UNSTABLE ANGINA PECTORIS: Primary | ICD-10-CM

## 2023-08-17 DIAGNOSIS — E78.2 MIXED HYPERLIPIDEMIA: ICD-10-CM

## 2023-08-17 PROCEDURE — 4010F ACE/ARB THERAPY RXD/TAKEN: CPT | Mod: CPTII,S$GLB,,

## 2023-08-17 PROCEDURE — 1159F PR MEDICATION LIST DOCUMENTED IN MEDICAL RECORD: ICD-10-PCS | Mod: CPTII,S$GLB,,

## 2023-08-17 PROCEDURE — 4010F PR ACE/ARB THEARPY RXD/TAKEN: ICD-10-PCS | Mod: CPTII,S$GLB,,

## 2023-08-17 PROCEDURE — 3074F PR MOST RECENT SYSTOLIC BLOOD PRESSURE < 130 MM HG: ICD-10-PCS | Mod: CPTII,S$GLB,,

## 2023-08-17 PROCEDURE — 1160F PR REVIEW ALL MEDS BY PRESCRIBER/CLIN PHARMACIST DOCUMENTED: ICD-10-PCS | Mod: CPTII,S$GLB,,

## 2023-08-17 PROCEDURE — 99214 OFFICE O/P EST MOD 30 MIN: CPT | Mod: S$GLB,,,

## 2023-08-17 PROCEDURE — 99999 PR PBB SHADOW E&M-EST. PATIENT-LVL IV: ICD-10-PCS | Mod: PBBFAC,,,

## 2023-08-17 PROCEDURE — 1159F MED LIST DOCD IN RCRD: CPT | Mod: CPTII,S$GLB,,

## 2023-08-17 PROCEDURE — 3078F PR MOST RECENT DIASTOLIC BLOOD PRESSURE < 80 MM HG: ICD-10-PCS | Mod: CPTII,S$GLB,,

## 2023-08-17 PROCEDURE — 99214 PR OFFICE/OUTPT VISIT, EST, LEVL IV, 30-39 MIN: ICD-10-PCS | Mod: S$GLB,,,

## 2023-08-17 PROCEDURE — 3008F BODY MASS INDEX DOCD: CPT | Mod: CPTII,S$GLB,,

## 2023-08-17 PROCEDURE — 1160F RVW MEDS BY RX/DR IN RCRD: CPT | Mod: CPTII,S$GLB,,

## 2023-08-17 PROCEDURE — 3074F SYST BP LT 130 MM HG: CPT | Mod: CPTII,S$GLB,,

## 2023-08-17 PROCEDURE — 3008F PR BODY MASS INDEX (BMI) DOCUMENTED: ICD-10-PCS | Mod: CPTII,S$GLB,,

## 2023-08-17 PROCEDURE — 3078F DIAST BP <80 MM HG: CPT | Mod: CPTII,S$GLB,,

## 2023-08-17 PROCEDURE — 99999 PR PBB SHADOW E&M-EST. PATIENT-LVL IV: CPT | Mod: PBBFAC,,,

## 2023-08-17 RX ORDER — POTASSIUM CHLORIDE 600 MG/1
8 TABLET, FILM COATED, EXTENDED RELEASE ORAL DAILY
Qty: 30 TABLET | Refills: 11 | Status: SHIPPED | OUTPATIENT
Start: 2023-08-17

## 2023-08-17 RX ORDER — FUROSEMIDE 20 MG/1
20 TABLET ORAL DAILY
Qty: 30 TABLET | Refills: 11 | Status: SHIPPED | OUTPATIENT
Start: 2023-08-17 | End: 2024-08-16

## 2023-08-17 RX ORDER — HYDROCHLOROTHIAZIDE 25 MG/1
25 TABLET ORAL DAILY
Qty: 90 TABLET | Refills: 3 | Status: CANCELLED | OUTPATIENT
Start: 2023-08-17 | End: 2024-08-16

## 2023-08-17 RX ORDER — ROSUVASTATIN CALCIUM 20 MG/1
20 TABLET, COATED ORAL NIGHTLY
Qty: 90 TABLET | Refills: 3 | Status: SHIPPED | OUTPATIENT
Start: 2023-08-17 | End: 2023-11-07 | Stop reason: SDUPTHER

## 2023-08-17 RX ORDER — CLOPIDOGREL BISULFATE 75 MG/1
75 TABLET ORAL DAILY
Qty: 90 TABLET | Refills: 3 | Status: SHIPPED | OUTPATIENT
Start: 2023-08-17 | End: 2023-11-07 | Stop reason: SDUPTHER

## 2023-08-17 RX ORDER — METOPROLOL SUCCINATE 25 MG/1
50 TABLET, EXTENDED RELEASE ORAL DAILY
Qty: 270 TABLET | Refills: 1 | Status: SHIPPED | OUTPATIENT
Start: 2023-08-17 | End: 2023-11-07 | Stop reason: SDUPTHER

## 2023-08-17 NOTE — ASSESSMENT & PLAN NOTE
Continue Crestor 20 mg daily reduce saturated fats.  Risk factor modification.  Recommend weight loss.  Increase activity as tolerated.  Cardiac rehab ordered.

## 2023-08-17 NOTE — ASSESSMENT & PLAN NOTE
CAD s/p PCI.  Continue dual antiplatelet therapy.  Continue losartan 50 mg daily.  Continue rosuvastatin 20 mg daily.  Repeat lipid panel.  Patient was complaining of increased fatigue and weight gain.  We will reduce metoprolol succinate to 50 mg daily.  Blood pressure today in office is 122/70.  Patient was also complaining of bilateral lower extremity edema.  We will stop hydrochlorothiazide and start Lasix 20 mg daily with KCl 8 mEq.  Check routine labs in approximately 1 week including fasting lipid panel.

## 2023-08-17 NOTE — PROGRESS NOTES
Subjective:    Patient ID:  Irving Tanner is a 38 y.o. male patient here for evaluation Coronary Artery Disease, Hyperlipidemia, and Hypertension      History of Present Illness:       Patient is here for a checkup.  He denies chest pain, lightheadedness, dizziness, jaw neck or arm pain, or bleeding.  Occasional shortness of breath likely secondary to sedentary lifestyle and increased weight gain.  Patient also states that he has bilateral lower extremity edema, 1+ nonpitting.  He has been taking hydrochlorothiazide.  He was taking metoprolol 75 mg daily.  Blood pressure is stable today in office.      Review of patient's allergies indicates:   Allergen Reactions    Motrin [ibuprofen]      swelling       Past Medical History:   Diagnosis Date    Cigarette nicotine dependence with nicotine-induced disorder 9/7/2022    Hyperlipidemia     Hypertension     Secondary polycythemia 11/6/2022     Past Surgical History:   Procedure Laterality Date    APPENDECTOMY      FOOT FRACTURE SURGERY      FRACTURE SURGERY      Incision and Drainage of Perirectal Abscess  06/22/2018        LEFT HEART CATHETERIZATION Left 10/09/2022    Procedure: Left heart cath;  Surgeon: Jose Rafael Hernandez MD;  Location: [facility] CATH[facility]EP LAB;  Service: Cardiology;  Laterality: Left;     Social History     Tobacco Use    Smoking status: Former     Current packs/day: 1.00     Average packs/day: 1 pack/day for 15.0 years (15.0 ttl pk-yrs)     Types: Cigarettes    Smokeless tobacco: Never   Substance Use Topics    Alcohol use: No     Comment: occasionally    Drug use: No        Review of Systems:    As noted in HPI in addition      REVIEW OF SYSTEMS  CARDIOVASCULAR: No recent chest pain, palpitations, arm, neck, or jaw pain  RESPIRATORY: No recent fever, cough chills, SOB or congestion  : No blood in the urine  GI: No Nausea, vomiting, constipation, diarrhea, blood, or reflux.  MUSCULOSKELETAL: No myalgias  NEURO: No lightheadedness or  dizziness  EYES: No Double vision, blurry, vision or headache              Objective        Vitals:    08/17/23 1139   BP: 122/70   Pulse: 76       LIPIDS - LAST 2   Lab Results   Component Value Date    CHOL 152 10/08/2022    CHOL 163 09/22/2022    HDL 43 10/08/2022    HDL 48 09/22/2022    LDLCALC 62.6 (L) 10/08/2022    LDLCALC 85 09/22/2022    TRIG 232 (H) 10/08/2022    TRIG 208 (H) 09/22/2022    CHOLHDL 28.3 10/08/2022    CHOLHDL 3.4 09/22/2022       CBC - LAST 2  Lab Results   Component Value Date    WBC 9.29 12/22/2022    WBC 7.04 12/19/2022    RBC 5.36 12/22/2022    RBC 5.50 12/19/2022    HGB 17.1 12/22/2022    HGB 17.5 12/19/2022    HCT 48.6 12/22/2022    HCT 51.2 12/19/2022    MCV 91 12/22/2022    MCV 93 12/19/2022    MCH 31.9 (H) 12/22/2022    MCH 31.8 (H) 12/19/2022    MCHC 35.2 12/22/2022    MCHC 34.2 12/19/2022    RDW 14.1 12/22/2022    RDW 14.3 12/19/2022     12/22/2022     12/19/2022    MPV 9.1 (L) 12/22/2022    MPV 9.3 12/19/2022    GRAN 5.7 12/22/2022    GRAN 61.1 12/22/2022    LYMPH 2.6 12/22/2022    LYMPH 27.6 12/22/2022    MONO 0.8 12/22/2022    MONO 8.2 12/22/2022    BASO 0.08 12/22/2022    BASO 0.08 12/19/2022    NRBC 0 12/22/2022    NRBC 0 12/19/2022       CHEMISTRY & LIVER FUNCTION - LAST 2  Lab Results   Component Value Date     12/22/2022     12/19/2022    K 4.1 12/22/2022    K 3.8 12/19/2022     12/22/2022     12/19/2022    CO2 26 12/22/2022    CO2 26 12/19/2022    ANIONGAP 7 (L) 12/22/2022    ANIONGAP 7 (L) 12/19/2022    BUN 15 12/22/2022    BUN 17 12/19/2022    CREATININE 0.9 12/22/2022    CREATININE 1.0 12/19/2022    GLU 91 12/22/2022    GLU 82 12/19/2022    CALCIUM 9.5 12/22/2022    CALCIUM 8.7 12/19/2022    MG 2.0 10/10/2022    MG 2.2 10/09/2022    ALBUMIN 4.6 12/22/2022    ALBUMIN 4.4 12/19/2022    PROT 7.9 12/22/2022    PROT 7.6 12/19/2022    ALKPHOS 48 (L) 12/22/2022    ALKPHOS 49 (L) 12/19/2022     (H) 12/22/2022     (H)  12/19/2022    AST 44 (H) 12/22/2022    AST 47 (H) 12/19/2022    BILITOT 0.6 12/22/2022    BILITOT 0.9 12/19/2022        CARDIAC PROFILE - LAST 2  Lab Results   Component Value Date    BNP 13 12/19/2022    BNP 15 09/22/2022     (H) 06/12/2020     (H) 06/12/2020    CPKMB 5.4 06/11/2020    TROPONINI 0.095 (H) 10/10/2022    TROPONINI <0.030 10/09/2022    TROPONINIHS 5.4 12/22/2022        COAGULATION - LAST 2  Lab Results   Component Value Date    LABPT 13.2 10/08/2022    LABPT 12.8 10/07/2022    INR 1.1 10/08/2022    INR 1.0 10/07/2022    APTT 58.5 (H) 10/09/2022    APTT 58.3 (H) 10/09/2022       ENDOCRINE & PSA - LAST 2  Lab Results   Component Value Date    TSH 1.24 09/22/2022    TSH 2.480 05/18/2021        ECHOCARDIOGRAM RESULTS  Results for orders placed during the hospital encounter of 10/07/22    Echo    Interpretation Summary  · The left ventricle is normal in size with mild concentric hypertrophy and normal systolic function.  · The estimated PA systolic pressure is 21 mmHg.  · Normal left ventricular diastolic function.  · Normal right ventricular size with normal right ventricular systolic function.  · Normal central venous pressure (3 mmHg).  · Mild tricuspid regurgitation.  · The estimated ejection fraction is 55%.  · Mild left atrial enlargement.      CURRENT/PREVIOUS VISIT EKG  Results for orders placed or performed during the hospital encounter of 12/22/22   EKG 12-lead    Collection Time: 12/22/22  6:29 PM    Narrative    Test Reason : R10.13,    Vent. Rate : 084 BPM     Atrial Rate : 084 BPM     P-R Int : 102 ms          QRS Dur : 100 ms      QT Int : 380 ms       P-R-T Axes : 049 000 017 degrees     QTc Int : 449 ms    Sinus rhythm with short CO with occasional Premature ventricular complexes  Otherwise normal ECG  When compared with ECG of 10-OCT-2022 06:13,  Premature ventricular complexes are now Present  CO interval has decreased  Nonspecific T wave abnormality no longer evident in  Lateral leads  QT has lengthened  Confirmed by David KNUTSON, Jose Rafael MURILLO ([id number]) on 12/23/2022 5:35:49 PM    Referred By: [email or web address]   SELF           Confirmed By:Jose Rafael Hernandez MD     No valid procedures specified.   Results for orders placed during the hospital encounter of 10/07/22    Nuclear Stress Test    Interpretation Summary    The EKG portion of this study is abnormal but not diagnostic.    The patient reported no chest pain during the stress test.    During stress, frequent PVCs are noted.    The nuclear portion of this study will be reported separately.    No valid procedures specified.    PHYSICAL EXAM  CONSTITUTIONAL: Well built, well nourished in no apparent distress  NECK: no carotid bruit, no JVD  LUNGS: CTA  CHEST WALL: no tenderness  HEART: regular rate and rhythm, S1, S2 normal, no murmur, click, rub or gallop   ABDOMEN: soft, non-tender; bowel sounds normal;   EXTREMITIES: Extremities normal, 1+ bilateral lower extremity edema  NEURO: AAO X 3    I HAVE REVIEWED :    The vital signs, nurses notes, and all the pertinent radiology and labs.        Current Outpatient Medications   Medication Instructions    aspirin (ECOTRIN) 81 mg, Oral, Daily    clopidogreL (PLAVIX) 75 mg, Oral, Daily    furosemide (LASIX) 20 mg, Oral, Daily    isosorbide mononitrate (IMDUR) 30 mg, Oral, Daily    losartan (COZAAR) 50 MG tablet TAKE 1 TABLET(50 MG) BY MOUTH EVERY DAY    metoprolol succinate (TOPROL-XL) 50 mg, Oral, Daily    pantoprazole (PROTONIX) 20 mg, Oral, Before breakfast    potassium chloride (KLOR-CON) 8 MEQ TbSR 8 mEq, Oral, Daily    rosuvastatin (CRESTOR) 20 mg, Oral, Nightly    traZODone (DESYREL) 100 mg, Oral, Nightly          Assessment & Plan     Coronary artery disease involving native coronary artery of native heart with unstable angina pectoris s/p PCI  CAD s/p PCI.  Continue dual antiplatelet therapy.  Continue losartan 50 mg daily.  Continue rosuvastatin 20 mg daily.  Repeat lipid panel.  Patient was  complaining of increased fatigue and weight gain.  We will reduce metoprolol succinate to 50 mg daily.  Blood pressure today in office is 122/70.  Patient was also complaining of bilateral lower extremity edema.  We will stop hydrochlorothiazide and start Lasix 20 mg daily with KCl 8 mEq.  Check routine labs in approximately 1 week including fasting lipid panel.    Essential hypertension  Stable.  Blood pressure 122/70 today in office.  Patient states his blood pressure is also stable at home.  Reduce metoprolol succinate to 50 mg daily as patient was complaining of increased fatigue and weight gain.  Start Lasix 20 mg daily with potassium chloride supplementation.  Increase activity.  We will also place a referral for cardiac rehab as patient has never gone    Mixed hyperlipidemia  Continue Crestor 20 mg daily reduce saturated fats.  Risk factor modification.  Recommend weight loss.  Increase activity as tolerated.  Cardiac rehab ordered.    Cigarette nicotine dependence with nicotine-induced disorder  Smoking cessation.          Follow up in about 3 months (around 11/17/2023).

## 2023-08-17 NOTE — ASSESSMENT & PLAN NOTE
Stable.  Blood pressure 122/70 today in office.  Patient states his blood pressure is also stable at home.  Reduce metoprolol succinate to 50 mg daily as patient was complaining of increased fatigue and weight gain.  Start Lasix 20 mg daily with potassium chloride supplementation.  Increase activity.  We will also place a referral for cardiac rehab as patient has never gone

## 2023-08-17 NOTE — Clinical Note
An angiography was performed of the left coronary arteries. The angiography was performed via power injection. The injected amount was 8 mL contrast at 4 mL/s. The PSI from the power injection was 400. O-Z Plasty Text: The defect edges were debeveled with a #15 scalpel blade.  Given the location of the defect, shape of the defect and the proximity to free margins an O-Z plasty (double transposition flap) was deemed most appropriate.  Using a sterile surgical marker, the appropriate transposition flaps were drawn incorporating the defect and placing the expected incisions within the relaxed skin tension lines where possible.    The area thus outlined was incised deep to adipose tissue with a #15 scalpel blade.  The skin margins were undermined to an appropriate distance in all directions utilizing iris scissors.  Hemostasis was achieved with electrocautery.  The flaps were then transposed into place, one clockwise and the other counterclockwise, and anchored with interrupted buried subcutaneous sutures.

## 2023-08-24 ENCOUNTER — LAB VISIT (OUTPATIENT)
Dept: LAB | Facility: HOSPITAL | Age: 38
End: 2023-08-24
Payer: COMMERCIAL

## 2023-08-24 DIAGNOSIS — I10 ESSENTIAL HYPERTENSION: ICD-10-CM

## 2023-08-24 LAB
ALBUMIN SERPL BCP-MCNC: 3.8 G/DL (ref 3.5–5.2)
ALP SERPL-CCNC: 48 U/L (ref 55–135)
ALT SERPL W/O P-5'-P-CCNC: 67 U/L (ref 10–44)
ANION GAP SERPL CALC-SCNC: 7 MMOL/L (ref 8–16)
ANION GAP SERPL CALC-SCNC: 7 MMOL/L (ref 8–16)
AST SERPL-CCNC: 28 U/L (ref 10–40)
BASOPHILS # BLD AUTO: 0.06 K/UL (ref 0–0.2)
BASOPHILS NFR BLD: 0.7 % (ref 0–1.9)
BILIRUB SERPL-MCNC: 0.7 MG/DL (ref 0.1–1)
BUN SERPL-MCNC: 17 MG/DL (ref 6–20)
BUN SERPL-MCNC: 17 MG/DL (ref 6–20)
CALCIUM SERPL-MCNC: 8.4 MG/DL (ref 8.7–10.5)
CALCIUM SERPL-MCNC: 8.4 MG/DL (ref 8.7–10.5)
CHLORIDE SERPL-SCNC: 106 MMOL/L (ref 95–110)
CHLORIDE SERPL-SCNC: 106 MMOL/L (ref 95–110)
CHOLEST SERPL-MCNC: 172 MG/DL (ref 120–199)
CHOLEST/HDLC SERPL: 3.1 {RATIO} (ref 2–5)
CO2 SERPL-SCNC: 23 MMOL/L (ref 23–29)
CO2 SERPL-SCNC: 23 MMOL/L (ref 23–29)
CREAT SERPL-MCNC: 1 MG/DL (ref 0.5–1.4)
CREAT SERPL-MCNC: 1 MG/DL (ref 0.5–1.4)
DIFFERENTIAL METHOD: NORMAL
EOSINOPHIL # BLD AUTO: 0.1 K/UL (ref 0–0.5)
EOSINOPHIL NFR BLD: 1.4 % (ref 0–8)
ERYTHROCYTE [DISTWIDTH] IN BLOOD BY AUTOMATED COUNT: 14 % (ref 11.5–14.5)
EST. GFR  (NO RACE VARIABLE): >60 ML/MIN/1.73 M^2
EST. GFR  (NO RACE VARIABLE): >60 ML/MIN/1.73 M^2
GLUCOSE SERPL-MCNC: 97 MG/DL (ref 70–110)
GLUCOSE SERPL-MCNC: 97 MG/DL (ref 70–110)
HCT VFR BLD AUTO: 47.2 % (ref 40–54)
HDLC SERPL-MCNC: 56 MG/DL (ref 40–75)
HDLC SERPL: 32.6 % (ref 20–50)
HGB BLD-MCNC: 16.1 G/DL (ref 14–18)
IMM GRANULOCYTES # BLD AUTO: 0.03 K/UL (ref 0–0.04)
IMM GRANULOCYTES NFR BLD AUTO: 0.3 % (ref 0–0.5)
LDLC SERPL CALC-MCNC: 90.6 MG/DL (ref 63–159)
LYMPHOCYTES # BLD AUTO: 2.2 K/UL (ref 1–4.8)
LYMPHOCYTES NFR BLD: 25.2 % (ref 18–48)
MCH RBC QN AUTO: 29.5 PG (ref 27–31)
MCHC RBC AUTO-ENTMCNC: 34.1 G/DL (ref 32–36)
MCV RBC AUTO: 86 FL (ref 82–98)
MONOCYTES # BLD AUTO: 0.6 K/UL (ref 0.3–1)
MONOCYTES NFR BLD: 6.7 % (ref 4–15)
NEUTROPHILS # BLD AUTO: 5.8 K/UL (ref 1.8–7.7)
NEUTROPHILS NFR BLD: 65.7 % (ref 38–73)
NONHDLC SERPL-MCNC: 116 MG/DL
NRBC BLD-RTO: 0 /100 WBC
PLATELET # BLD AUTO: 251 K/UL (ref 150–450)
PMV BLD AUTO: 9.3 FL (ref 9.2–12.9)
POTASSIUM SERPL-SCNC: 3.8 MMOL/L (ref 3.5–5.1)
POTASSIUM SERPL-SCNC: 3.8 MMOL/L (ref 3.5–5.1)
PROT SERPL-MCNC: 7.4 G/DL (ref 6–8.4)
RBC # BLD AUTO: 5.46 M/UL (ref 4.6–6.2)
SODIUM SERPL-SCNC: 136 MMOL/L (ref 136–145)
SODIUM SERPL-SCNC: 136 MMOL/L (ref 136–145)
TRIGL SERPL-MCNC: 127 MG/DL (ref 30–150)
TSH SERPL DL<=0.005 MIU/L-ACNC: 0.99 UIU/ML (ref 0.34–5.6)
WBC # BLD AUTO: 8.84 K/UL (ref 3.9–12.7)

## 2023-08-24 PROCEDURE — 80061 LIPID PANEL: CPT

## 2023-08-24 PROCEDURE — 80053 COMPREHEN METABOLIC PANEL: CPT

## 2023-08-24 PROCEDURE — 36415 COLL VENOUS BLD VENIPUNCTURE: CPT

## 2023-08-24 PROCEDURE — 84443 ASSAY THYROID STIM HORMONE: CPT

## 2023-08-24 PROCEDURE — 85025 COMPLETE CBC W/AUTO DIFF WBC: CPT

## 2023-09-04 DIAGNOSIS — F51.01 PRIMARY INSOMNIA: ICD-10-CM

## 2023-09-05 RX ORDER — TRAZODONE HYDROCHLORIDE 100 MG/1
100 TABLET ORAL NIGHTLY
Qty: 90 TABLET | Refills: 1 | Status: SHIPPED | OUTPATIENT
Start: 2023-09-05 | End: 2023-10-18 | Stop reason: DRUGHIGH

## 2023-09-15 ENCOUNTER — TELEPHONE (OUTPATIENT)
Dept: CARDIAC REHAB | Facility: HOSPITAL | Age: 38
End: 2023-09-15

## 2023-09-15 NOTE — TELEPHONE ENCOUNTER
RECEIVED REFERRAL FOR CARDIAC REHAB.  CARDIAC REHAB IS NOT DEEMED MEDICALLY NECESSARY FOR ICD CODE I25.110.  CLOSING REFERRAL.

## 2023-10-18 ENCOUNTER — OFFICE VISIT (OUTPATIENT)
Dept: FAMILY MEDICINE | Facility: CLINIC | Age: 38
End: 2023-10-18
Payer: COMMERCIAL

## 2023-10-18 VITALS
WEIGHT: 228 LBS | HEART RATE: 100 BPM | HEIGHT: 70 IN | OXYGEN SATURATION: 98 % | DIASTOLIC BLOOD PRESSURE: 82 MMHG | BODY MASS INDEX: 32.64 KG/M2 | TEMPERATURE: 99 F | SYSTOLIC BLOOD PRESSURE: 126 MMHG

## 2023-10-18 DIAGNOSIS — F51.01 PRIMARY INSOMNIA: ICD-10-CM

## 2023-10-18 DIAGNOSIS — I25.110 CORONARY ARTERY DISEASE INVOLVING NATIVE CORONARY ARTERY OF NATIVE HEART WITH UNSTABLE ANGINA PECTORIS: ICD-10-CM

## 2023-10-18 DIAGNOSIS — I10 ESSENTIAL HYPERTENSION: Primary | ICD-10-CM

## 2023-10-18 DIAGNOSIS — K21.9 GASTROESOPHAGEAL REFLUX DISEASE, UNSPECIFIED WHETHER ESOPHAGITIS PRESENT: ICD-10-CM

## 2023-10-18 DIAGNOSIS — E78.2 MIXED HYPERLIPIDEMIA: ICD-10-CM

## 2023-10-18 PROBLEM — R14.0 ABDOMINAL DISTENSION: Status: RESOLVED | Noted: 2023-10-18 | Resolved: 2023-10-18

## 2023-10-18 PROBLEM — R14.0 ABDOMINAL DISTENSION: Status: ACTIVE | Noted: 2023-10-18

## 2023-10-18 PROCEDURE — 1159F PR MEDICATION LIST DOCUMENTED IN MEDICAL RECORD: ICD-10-PCS | Mod: CPTII,S$GLB,, | Performed by: NURSE PRACTITIONER

## 2023-10-18 PROCEDURE — 1159F MED LIST DOCD IN RCRD: CPT | Mod: CPTII,S$GLB,, | Performed by: NURSE PRACTITIONER

## 2023-10-18 PROCEDURE — 3008F BODY MASS INDEX DOCD: CPT | Mod: CPTII,S$GLB,, | Performed by: NURSE PRACTITIONER

## 2023-10-18 PROCEDURE — 1160F RVW MEDS BY RX/DR IN RCRD: CPT | Mod: CPTII,S$GLB,, | Performed by: NURSE PRACTITIONER

## 2023-10-18 PROCEDURE — 99214 PR OFFICE/OUTPT VISIT, EST, LEVL IV, 30-39 MIN: ICD-10-PCS | Mod: S$GLB,,, | Performed by: NURSE PRACTITIONER

## 2023-10-18 PROCEDURE — 4010F ACE/ARB THERAPY RXD/TAKEN: CPT | Mod: CPTII,S$GLB,, | Performed by: NURSE PRACTITIONER

## 2023-10-18 PROCEDURE — 3079F PR MOST RECENT DIASTOLIC BLOOD PRESSURE 80-89 MM HG: ICD-10-PCS | Mod: CPTII,S$GLB,, | Performed by: NURSE PRACTITIONER

## 2023-10-18 PROCEDURE — 3079F DIAST BP 80-89 MM HG: CPT | Mod: CPTII,S$GLB,, | Performed by: NURSE PRACTITIONER

## 2023-10-18 PROCEDURE — 1160F PR REVIEW ALL MEDS BY PRESCRIBER/CLIN PHARMACIST DOCUMENTED: ICD-10-PCS | Mod: CPTII,S$GLB,, | Performed by: NURSE PRACTITIONER

## 2023-10-18 PROCEDURE — 99214 OFFICE O/P EST MOD 30 MIN: CPT | Mod: S$GLB,,, | Performed by: NURSE PRACTITIONER

## 2023-10-18 PROCEDURE — 3074F SYST BP LT 130 MM HG: CPT | Mod: CPTII,S$GLB,, | Performed by: NURSE PRACTITIONER

## 2023-10-18 PROCEDURE — 4010F PR ACE/ARB THEARPY RXD/TAKEN: ICD-10-PCS | Mod: CPTII,S$GLB,, | Performed by: NURSE PRACTITIONER

## 2023-10-18 PROCEDURE — 3008F PR BODY MASS INDEX (BMI) DOCUMENTED: ICD-10-PCS | Mod: CPTII,S$GLB,, | Performed by: NURSE PRACTITIONER

## 2023-10-18 PROCEDURE — 3074F PR MOST RECENT SYSTOLIC BLOOD PRESSURE < 130 MM HG: ICD-10-PCS | Mod: CPTII,S$GLB,, | Performed by: NURSE PRACTITIONER

## 2023-10-18 RX ORDER — TRAZODONE HYDROCHLORIDE 150 MG/1
150 TABLET ORAL NIGHTLY
Qty: 90 TABLET | Refills: 1 | Status: SHIPPED | OUTPATIENT
Start: 2023-10-18

## 2023-10-18 RX ORDER — PANTOPRAZOLE SODIUM 20 MG/1
20 TABLET, DELAYED RELEASE ORAL
Qty: 90 TABLET | Refills: 1 | Status: SHIPPED | OUTPATIENT
Start: 2023-10-18 | End: 2023-11-07 | Stop reason: SDUPTHER

## 2023-10-18 NOTE — PROGRESS NOTES
SUBJECTIVE:      Patient ID: Irving Tanner is a 38 y.o. male.    Chief Complaint: Hypertension, Follow-up, and Medication Refill (Pt states he would like his trazodone increased )    38-year-old male presents to the clinic for hypertension, hyperlipidemia, and insomnia follow-up. Labs completed in August were reviewed.    He continues to see cardiology for CAD s/p PCI. Continues on dual antiplatelet therapy. Metoprolol succinate decreased to 50 mg due to fatigue and weight gain. He was also switched from HCTZ to Lasix due to lower extremity edema.      Blood pressure stable with Lasix 20 mg, metoprolol succinate 50 mg daily, and Losartan 50 mg. Kidney function stable.    Cholesterol managed with Crestor 20 mg.  Last lipid panel completed in August.  LDL 90.6, HDL 56, triglycerides 127, and total cholesterol 172.  Liver enzymes are improving. He has fatty liver noted on imaging.     He is currently trazodone 100 mg for insomnia. Reports difficulty falling asleep and staying asleep. Reports frequent awakenings if he does fall asleep. He would like the dosage increased.    Declined the influenza vaccine.         Family History   Problem Relation Age of Onset    Hypertension Mother     Diabetes Mother     Thyroid disease Mother     No Known Problems Father       Social History     Socioeconomic History    Marital status:     Number of children: 1   Tobacco Use    Smoking status: Former     Current packs/day: 1.00     Average packs/day: 1 pack/day for 15.0 years (15.0 ttl pk-yrs)     Types: Cigarettes    Smokeless tobacco: Never   Substance and Sexual Activity    Alcohol use: No     Comment: occasionally    Drug use: No    Sexual activity: Yes     Partners: Female     Birth control/protection: None     Current Outpatient Medications   Medication Sig Dispense Refill    clopidogreL (PLAVIX) 75 mg tablet Take 1 tablet (75 mg total) by mouth once daily. 90 tablet 3    furosemide (LASIX) 20 MG tablet Take 1  Attempted to reach patient back. Was able to leave voicemail and asked patient to call back if further concerns or if something needs to be addressed.    tablet (20 mg total) by mouth once daily. 30 tablet 11    isosorbide mononitrate (IMDUR) 30 MG 24 hr tablet Take 1 tablet (30 mg total) by mouth once daily. 90 tablet 3    losartan (COZAAR) 50 MG tablet TAKE 1 TABLET(50 MG) BY MOUTH EVERY DAY 90 tablet 1    metoprolol succinate (TOPROL-XL) 25 MG 24 hr tablet Take 2 tablets (50 mg total) by mouth once daily. 270 tablet 1    potassium chloride (KLOR-CON) 8 MEQ TbSR Take 1 tablet (8 mEq total) by mouth once daily. 30 tablet 11    rosuvastatin (CRESTOR) 20 MG tablet Take 1 tablet (20 mg total) by mouth every evening. 90 tablet 3    aspirin (ECOTRIN) 81 MG EC tablet Take 1 tablet (81 mg total) by mouth once daily. 90 tablet 0    pantoprazole (PROTONIX) 20 MG tablet Take 1 tablet (20 mg total) by mouth before breakfast. 90 tablet 1    traZODone (DESYREL) 150 MG tablet Take 1 tablet (150 mg total) by mouth every evening. 90 tablet 1     No current facility-administered medications for this visit.     Review of patient's allergies indicates:   Allergen Reactions    Motrin [ibuprofen]      swelling      Past Medical History:   Diagnosis Date    Cigarette nicotine dependence with nicotine-induced disorder 9/7/2022    Hyperlipidemia     Hypertension     Secondary polycythemia 11/6/2022     Past Surgical History:   Procedure Laterality Date    APPENDECTOMY      FOOT FRACTURE SURGERY      FRACTURE SURGERY      Incision and Drainage of Perirectal Abscess  06/22/2018        LEFT HEART CATHETERIZATION Left 10/09/2022    Procedure: Left heart cath;  Surgeon: Jose Rafael Hernandez MD;  Location: [facility]EP LAB;  Service: Cardiology;  Laterality: Left;       Review of Systems   Constitutional:  Positive for unexpected weight change. Negative for activity change, appetite change, chills, diaphoresis, fatigue and fever.   HENT:  Negative for hearing loss, rhinorrhea and trouble swallowing.    Eyes:  Negative for discharge and visual disturbance.   Respiratory:  Negative for  "chest tightness and wheezing.    Cardiovascular:  Negative for chest pain and palpitations.   Gastrointestinal:  Negative for blood in stool, constipation, diarrhea and vomiting.   Endocrine: Negative for polydipsia and polyuria.   Genitourinary:  Negative for difficulty urinating, hematuria and urgency.   Musculoskeletal:  Negative for arthralgias, joint swelling and neck pain.   Neurological:  Negative for weakness and headaches.   Psychiatric/Behavioral:  Negative for confusion and dysphoric mood.       OBJECTIVE:      Vitals:    10/18/23 1331   BP: 126/82   BP Location: Left arm   Patient Position: Sitting   BP Method: Large (Manual)   Pulse: 100   Temp: 98.5 °F (36.9 °C)   TempSrc: Oral   SpO2: 98%   Weight: 103.4 kg (228 lb)   Height: 5' 10" (1.778 m)     Physical Exam  Vitals and nursing note reviewed.   Constitutional:       General: He is awake. He is not in acute distress.     Appearance: Normal appearance. He is obese. He is not ill-appearing, toxic-appearing or diaphoretic.   HENT:      Head: Normocephalic and atraumatic.      Nose: Nose normal.   Eyes:      General: Lids are normal. Gaze aligned appropriately.      Conjunctiva/sclera: Conjunctivae normal.      Right eye: Right conjunctiva is not injected.      Left eye: Left conjunctiva is not injected.      Pupils: Pupils are equal, round, and reactive to light.   Cardiovascular:      Rate and Rhythm: Normal rate and regular rhythm.      Pulses: Normal pulses.      Heart sounds: Normal heart sounds, S1 normal and S2 normal. No murmur heard.     No friction rub. No gallop.   Pulmonary:      Effort: Pulmonary effort is normal. No respiratory distress.      Breath sounds: Normal breath sounds. No stridor. No decreased breath sounds, wheezing, rhonchi or rales.   Chest:      Chest wall: No tenderness.   Musculoskeletal:      Cervical back: Neck supple.      Right lower leg: No edema.      Left lower leg: No edema.   Lymphadenopathy:      Cervical: No " cervical adenopathy.   Skin:     General: Skin is warm and dry.      Capillary Refill: Capillary refill takes less than 2 seconds.      Findings: No erythema or rash.   Neurological:      Mental Status: He is alert and oriented to person, place, and time. Mental status is at baseline.   Psychiatric:         Attention and Perception: Attention normal.         Mood and Affect: Mood normal.         Speech: Speech normal.         Behavior: Behavior normal. Behavior is cooperative.         Thought Content: Thought content normal.         Judgment: Judgment normal.        Lab Visit on 08/24/2023   Component Date Value Ref Range Status    Sodium 08/24/2023 136  136 - 145 mmol/L Final    Potassium 08/24/2023 3.8  3.5 - 5.1 mmol/L Final    Chloride 08/24/2023 106  95 - 110 mmol/L Final    CO2 08/24/2023 23  23 - 29 mmol/L Final    Glucose 08/24/2023 97  70 - 110 mg/dL Final    BUN 08/24/2023 17  6 - 20 mg/dL Final    Creatinine 08/24/2023 1.0  0.5 - 1.4 mg/dL Final    Calcium 08/24/2023 8.4 (L)  8.7 - 10.5 mg/dL Final    Anion Gap 08/24/2023 7 (L)  8 - 16 mmol/L Final    eGFR 08/24/2023 >60.0  >60 mL/min/1.73 m^2 Final    Sodium 08/24/2023 136  136 - 145 mmol/L Final    Potassium 08/24/2023 3.8  3.5 - 5.1 mmol/L Final    Chloride 08/24/2023 106  95 - 110 mmol/L Final    CO2 08/24/2023 23  23 - 29 mmol/L Final    Glucose 08/24/2023 97  70 - 110 mg/dL Final    BUN 08/24/2023 17  6 - 20 mg/dL Final    Creatinine 08/24/2023 1.0  0.5 - 1.4 mg/dL Final    Calcium 08/24/2023 8.4 (L)  8.7 - 10.5 mg/dL Final    Total Protein 08/24/2023 7.4  6.0 - 8.4 g/dL Final    Albumin 08/24/2023 3.8  3.5 - 5.2 g/dL Final    Total Bilirubin 08/24/2023 0.7  0.1 - 1.0 mg/dL Final    Comment: For infants and newborns, interpretation of results should be based  on gestational age, weight and in agreement with clinical  observations.    Premature Infant recommended reference ranges:  Up to 24 hours.............<8.0 mg/dL  Up to 48  hours............<12.0 mg/dL  3-5 days..................<15.0 mg/dL  6-29 days.................<15.0 mg/dL      Alkaline Phosphatase 08/24/2023 48 (L)  55 - 135 U/L Final    AST 08/24/2023 28  10 - 40 U/L Final    ALT 08/24/2023 67 (H)  10 - 44 U/L Final    eGFR 08/24/2023 >60.0  >60 mL/min/1.73 m^2 Final    Anion Gap 08/24/2023 7 (L)  8 - 16 mmol/L Final    Cholesterol 08/24/2023 172  120 - 199 mg/dL Final    Comment: The National Cholesterol Education Program (NCEP) has set the  following guidelines (reference ranges) for Cholesterol:  Optimal.....................<200 mg/dL  Borderline High.............200-239 mg/dL  High........................> or = 240 mg/dL      Triglycerides 08/24/2023 127  30 - 150 mg/dL Final    Comment: The National Cholesterol Education Program (NCEP) has set the  following guidelines (reference values) for triglycerides:  Normal......................<150 mg/dL  Borderline High.............150-199 mg/dL  High........................200-499 mg/dL      HDL 08/24/2023 56  40 - 75 mg/dL Final    Comment: The National Cholesterol Education Program (NCEP) has set the  following guidelines (reference values) for HDL Cholesterol:  Low...............<40 mg/dL  Optimal...........>60 mg/dL      LDL Cholesterol 08/24/2023 90.6  63.0 - 159.0 mg/dL Final    Comment: The National Cholesterol Education Program (NCEP) has set the  following guidelines (reference values) for LDL Cholesterol:  Optimal.......................<130 mg/dL  Borderline High...............130-159 mg/dL  High..........................160-189 mg/dL  Very High.....................>190 mg/dL      HDL/Cholesterol Ratio 08/24/2023 32.6  20.0 - 50.0 % Final    Total Cholesterol/HDL Ratio 08/24/2023 3.1  2.0 - 5.0 Final    Non-HDL Cholesterol 08/24/2023 116  mg/dL Final    Comment: Risk category and Non-HDL cholesterol goals:  Coronary heart disease (CHD)or equivalent (10-year risk of CHD >20%):  Non-HDL cholesterol goal     <130  mg/dL  Two or more CHD risk factors and 10-year risk of CHD <= 20%:  Non-HDL cholesterol goal     <160 mg/dL  0 to 1 CHD risk factor:  Non-HDL cholesterol goal     <190 mg/dL      WBC 08/24/2023 8.84  3.90 - 12.70 K/uL Final    RBC 08/24/2023 5.46  4.60 - 6.20 M/uL Final    Hemoglobin 08/24/2023 16.1  14.0 - 18.0 g/dL Final    Hematocrit 08/24/2023 47.2  40.0 - 54.0 % Final    MCV 08/24/2023 86  82 - 98 fL Final    MCH 08/24/2023 29.5  27.0 - 31.0 pg Final    MCHC 08/24/2023 34.1  32.0 - 36.0 g/dL Final    RDW 08/24/2023 14.0  11.5 - 14.5 % Final    Platelets 08/24/2023 251  150 - 450 K/uL Final    MPV 08/24/2023 9.3  9.2 - 12.9 fL Final    Immature Granulocytes 08/24/2023 0.3  0.0 - 0.5 % Final    Gran # (ANC) 08/24/2023 5.8  1.8 - 7.7 K/uL Final    Immature Grans (Abs) 08/24/2023 0.03  0.00 - 0.04 K/uL Final    Comment: Mild elevation in immature granulocytes is non specific and   can be seen in a variety of conditions including stress response,   acute inflammation, trauma and pregnancy. Correlation with other   laboratory and clinical findings is essential.      Lymph # 08/24/2023 2.2  1.0 - 4.8 K/uL Final    Mono # 08/24/2023 0.6  0.3 - 1.0 K/uL Final    Eos # 08/24/2023 0.1  0.0 - 0.5 K/uL Final    Baso # 08/24/2023 0.06  0.00 - 0.20 K/uL Final    nRBC 08/24/2023 0  0 /100 WBC Final    Gran % 08/24/2023 65.7  38.0 - 73.0 % Final    Lymph % 08/24/2023 25.2  18.0 - 48.0 % Final    Mono % 08/24/2023 6.7  4.0 - 15.0 % Final    Eosinophil % 08/24/2023 1.4  0.0 - 8.0 % Final    Basophil % 08/24/2023 0.7  0.0 - 1.9 % Final    Differential Method 08/24/2023 Automated   Final    TSH 08/24/2023 0.990  0.340 - 5.600 uIU/mL Final     Assessment:       1. Essential hypertension    2. Mixed hyperlipidemia    3. Primary insomnia    4. Coronary artery disease involving native coronary artery of native heart with unstable angina pectoris    5. Gastroesophageal reflux disease, unspecified whether esophagitis present         Plan:       Essential hypertension  Stable, continue Lasix 20 mg, metoprolol succinate 50 mg daily, and Losartan 50 mg. Reduce the amount of salt in your diet; Lose weight; Avoid drinking too much alcohol; Exercise at least 30 minutes per day most days of the week.     Mixed hyperlipidemia  Continue Crestor 20 mg, LDL should be closer to 70.  Limit red meat, butter, fried foods, cheese, and other foods that have a lot of saturated fat. Consume more: lean meats, fish, fruits, vegetables, whole grains, beans, lentils, and nuts.  Weight loss, and 30-45 min of cardiovascular exercise daily.    Primary insomnia  Trazodone increased. Recommend regular sleep schedule, optimal sleep environment, and relaxing presleep rituals. Avoid daytime naps. Avoid caffeine after noon. Avoid excess alcohol. Avoid tobacco. Recommended daily exercise.   -     traZODone (DESYREL) 150 MG tablet; Take 1 tablet (150 mg total) by mouth every evening.  Dispense: 90 tablet; Refill: 1    Coronary artery disease involving native coronary artery of native heart with unstable angina pectoris  On dual antiplatelet therapy, continue current meds, managed by Cardiology.     Gastroesophageal reflux disease, unspecified whether esophagitis present  Protonix refilled. Avoid acidic/spicy/greasy foods, limit NSAID use, avoid eating 4 hrs before bed, and elevate head of bed if needed.  -     pantoprazole (PROTONIX) 20 MG tablet; Take 1 tablet (20 mg total) by mouth before breakfast.  Dispense: 90 tablet; Refill: 1    This note was created using [employer] voice recognition software that occasionally misinterprets phrases or words.     I spent a total of 22 minutes on the day of the visit.This includes face to face time and non-face to face time preparing to see the patient (eg, review of tests), obtaining and/or reviewing separately obtained history, documenting clinical information in the electronic or other health record, independently interpreting results  and communicating results to the patient/family/caregiver, or care coordinator.    Follow up in about 6 months (around 4/18/2024) for HTN, Lipids, Insomnia.      10/18/2023 AGA Maradiaga, NAVINP

## 2023-11-07 ENCOUNTER — OFFICE VISIT (OUTPATIENT)
Dept: CARDIOLOGY | Facility: CLINIC | Age: 38
End: 2023-11-07
Payer: COMMERCIAL

## 2023-11-07 VITALS
SYSTOLIC BLOOD PRESSURE: 132 MMHG | DIASTOLIC BLOOD PRESSURE: 70 MMHG | BODY MASS INDEX: 33.4 KG/M2 | WEIGHT: 232.81 LBS | OXYGEN SATURATION: 98 % | HEART RATE: 71 BPM

## 2023-11-07 DIAGNOSIS — E78.2 MIXED HYPERLIPIDEMIA: ICD-10-CM

## 2023-11-07 DIAGNOSIS — K21.9 GASTROESOPHAGEAL REFLUX DISEASE, UNSPECIFIED WHETHER ESOPHAGITIS PRESENT: ICD-10-CM

## 2023-11-07 DIAGNOSIS — I25.110 CORONARY ARTERY DISEASE INVOLVING NATIVE CORONARY ARTERY OF NATIVE HEART WITH UNSTABLE ANGINA PECTORIS: Primary | ICD-10-CM

## 2023-11-07 DIAGNOSIS — F17.219 CIGARETTE NICOTINE DEPENDENCE WITH NICOTINE-INDUCED DISORDER: ICD-10-CM

## 2023-11-07 DIAGNOSIS — I10 ESSENTIAL HYPERTENSION: ICD-10-CM

## 2023-11-07 PROCEDURE — 3075F PR MOST RECENT SYSTOLIC BLOOD PRESS GE 130-139MM HG: ICD-10-PCS | Mod: CPTII,S$GLB,,

## 2023-11-07 PROCEDURE — 3078F PR MOST RECENT DIASTOLIC BLOOD PRESSURE < 80 MM HG: ICD-10-PCS | Mod: CPTII,S$GLB,,

## 2023-11-07 PROCEDURE — 3075F SYST BP GE 130 - 139MM HG: CPT | Mod: CPTII,S$GLB,,

## 2023-11-07 PROCEDURE — 1159F MED LIST DOCD IN RCRD: CPT | Mod: CPTII,S$GLB,,

## 2023-11-07 PROCEDURE — 1159F PR MEDICATION LIST DOCUMENTED IN MEDICAL RECORD: ICD-10-PCS | Mod: CPTII,S$GLB,,

## 2023-11-07 PROCEDURE — 99999 PR PBB SHADOW E&M-EST. PATIENT-LVL III: CPT | Mod: PBBFAC,,,

## 2023-11-07 PROCEDURE — 93000 ELECTROCARDIOGRAM COMPLETE: CPT | Mod: S$GLB,,, | Performed by: INTERNAL MEDICINE

## 2023-11-07 PROCEDURE — 99214 PR OFFICE/OUTPT VISIT, EST, LEVL IV, 30-39 MIN: ICD-10-PCS | Mod: S$GLB,,,

## 2023-11-07 PROCEDURE — 99214 OFFICE O/P EST MOD 30 MIN: CPT | Mod: S$GLB,,,

## 2023-11-07 PROCEDURE — 1160F PR REVIEW ALL MEDS BY PRESCRIBER/CLIN PHARMACIST DOCUMENTED: ICD-10-PCS | Mod: CPTII,S$GLB,,

## 2023-11-07 PROCEDURE — 3078F DIAST BP <80 MM HG: CPT | Mod: CPTII,S$GLB,,

## 2023-11-07 PROCEDURE — 99999 PR PBB SHADOW E&M-EST. PATIENT-LVL III: ICD-10-PCS | Mod: PBBFAC,,,

## 2023-11-07 PROCEDURE — 4010F ACE/ARB THERAPY RXD/TAKEN: CPT | Mod: CPTII,S$GLB,,

## 2023-11-07 PROCEDURE — 4010F PR ACE/ARB THEARPY RXD/TAKEN: ICD-10-PCS | Mod: CPTII,S$GLB,,

## 2023-11-07 PROCEDURE — 3008F PR BODY MASS INDEX (BMI) DOCUMENTED: ICD-10-PCS | Mod: CPTII,S$GLB,,

## 2023-11-07 PROCEDURE — 3008F BODY MASS INDEX DOCD: CPT | Mod: CPTII,S$GLB,,

## 2023-11-07 PROCEDURE — 93000 EKG 12-LEAD: ICD-10-PCS | Mod: S$GLB,,, | Performed by: INTERNAL MEDICINE

## 2023-11-07 PROCEDURE — 1160F RVW MEDS BY RX/DR IN RCRD: CPT | Mod: CPTII,S$GLB,,

## 2023-11-07 RX ORDER — METOPROLOL SUCCINATE 25 MG/1
50 TABLET, EXTENDED RELEASE ORAL DAILY
Qty: 270 TABLET | Refills: 3 | Status: SHIPPED | OUTPATIENT
Start: 2023-11-07

## 2023-11-07 RX ORDER — ROSUVASTATIN CALCIUM 40 MG/1
40 TABLET, COATED ORAL NIGHTLY
Qty: 90 TABLET | Refills: 3 | Status: SHIPPED | OUTPATIENT
Start: 2023-11-07 | End: 2024-11-01

## 2023-11-07 RX ORDER — ASPIRIN 81 MG/1
81 TABLET ORAL DAILY
Qty: 90 TABLET | Refills: 0 | Status: SHIPPED | OUTPATIENT
Start: 2023-11-07 | End: 2024-02-05

## 2023-11-07 RX ORDER — ISOSORBIDE MONONITRATE 30 MG/1
30 TABLET, EXTENDED RELEASE ORAL DAILY
Qty: 90 TABLET | Refills: 3 | Status: SHIPPED | OUTPATIENT
Start: 2023-11-07

## 2023-11-07 RX ORDER — LOSARTAN POTASSIUM 50 MG/1
50 TABLET ORAL DAILY
Qty: 90 TABLET | Refills: 3 | Status: SHIPPED | OUTPATIENT
Start: 2023-11-07 | End: 2024-11-06

## 2023-11-07 RX ORDER — PANTOPRAZOLE SODIUM 20 MG/1
20 TABLET, DELAYED RELEASE ORAL
Qty: 90 TABLET | Refills: 1 | Status: SHIPPED | OUTPATIENT
Start: 2023-11-07

## 2023-11-07 RX ORDER — CLOPIDOGREL BISULFATE 75 MG/1
75 TABLET ORAL DAILY
Qty: 90 TABLET | Refills: 3 | Status: SHIPPED | OUTPATIENT
Start: 2023-11-07 | End: 2024-11-01

## 2023-11-07 NOTE — PROGRESS NOTES
Subjective:    Patient ID:  Irving Tanner is a 38 y.o. male patient here for evaluation Coronary Artery Disease      History of Present Illness    Patient is here for a check up.  Denies CP/SOB, lightheadedness, dizziness, jaw/neck/arm pain, edema and bleeding.  He hasn't gone to cardiac rehab yet. BP stable.  Improved edema since switching to lasix. C/o fatigue and weight gain.     EKG today in office NSR, no acute ST-T wave changes      Review of patient's allergies indicates:   Allergen Reactions    Motrin [ibuprofen]      swelling       Past Medical History:   Diagnosis Date    Cigarette nicotine dependence with nicotine-induced disorder 9/7/2022    Hyperlipidemia     Hypertension     Secondary polycythemia 11/6/2022     Past Surgical History:   Procedure Laterality Date    APPENDECTOMY      FOOT FRACTURE SURGERY      FRACTURE SURGERY      Incision and Drainage of Perirectal Abscess  06/22/2018        LEFT HEART CATHETERIZATION Left 10/09/2022    Procedure: Left heart cath;  Surgeon: Jose Rafael Hernandez MD;  Location: [facility] CATH/EP LAB;  Service: Cardiology;  Laterality: Left;     Social History     Tobacco Use    Smoking status: Former     Current packs/day: 1.00     Average packs/day: 1 pack/day for 15.0 years (15.0 ttl pk-yrs)     Types: Cigarettes    Smokeless tobacco: Never   Substance Use Topics    Alcohol use: No     Comment: occasionally    Drug use: No        Review of Systems:    As noted in HPI in addition                 Objective        Vitals:    11/07/23 1302   BP: 132/70   Pulse: 71       LIPIDS - LAST 2   Lab Results   Component Value Date    CHOL 172 08/24/2023    CHOL 152 10/08/2022    HDL 56 08/24/2023    HDL 43 10/08/2022    LDLCALC 90.6 08/24/2023    LDLCALC 62.6 (L) 10/08/2022    TRIG 127 08/24/2023    TRIG 232 (H) 10/08/2022    CHOLHDL 32.6 08/24/2023    CHOLHDL 28.3 10/08/2022       CBC - LAST 2  Lab Results   Component Value Date    WBC 8.84 08/24/2023    WBC 9.29 12/22/2022     RBC 5.46 08/24/2023    RBC 5.36 12/22/2022    HGB 16.1 08/24/2023    HGB 17.1 12/22/2022    HCT 47.2 08/24/2023    HCT 48.6 12/22/2022    MCV 86 08/24/2023    MCV 91 12/22/2022    MCH 29.5 08/24/2023    MCH 31.9 (H) 12/22/2022    MCHC 34.1 08/24/2023    MCHC 35.2 12/22/2022    RDW 14.0 08/24/2023    RDW 14.1 12/22/2022     08/24/2023     12/22/2022    MPV 9.3 08/24/2023    MPV 9.1 (L) 12/22/2022    GRAN 5.8 08/24/2023    GRAN 65.7 08/24/2023    LYMPH 2.2 08/24/2023    LYMPH 25.2 08/24/2023    MONO 0.6 08/24/2023    MONO 6.7 08/24/2023    BASO 0.06 08/24/2023    BASO 0.08 12/22/2022    NRBC 0 08/24/2023    NRBC 0 12/22/2022       CHEMISTRY & LIVER FUNCTION - LAST 2  Lab Results   Component Value Date     08/24/2023     08/24/2023    K 3.8 08/24/2023    K 3.8 08/24/2023     08/24/2023     08/24/2023    CO2 23 08/24/2023    CO2 23 08/24/2023    ANIONGAP 7 (L) 08/24/2023    ANIONGAP 7 (L) 08/24/2023    BUN 17 08/24/2023    BUN 17 08/24/2023    CREATININE 1.0 08/24/2023    CREATININE 1.0 08/24/2023    GLU 97 08/24/2023    GLU 97 08/24/2023    CALCIUM 8.4 (L) 08/24/2023    CALCIUM 8.4 (L) 08/24/2023    MG 2.0 10/10/2022    MG 2.2 10/09/2022    ALBUMIN 3.8 08/24/2023    ALBUMIN 4.6 12/22/2022    PROT 7.4 08/24/2023    PROT 7.9 12/22/2022    ALKPHOS 48 (L) 08/24/2023    ALKPHOS 48 (L) 12/22/2022    ALT 67 (H) 08/24/2023     (H) 12/22/2022    AST 28 08/24/2023    AST 44 (H) 12/22/2022    BILITOT 0.7 08/24/2023    BILITOT 0.6 12/22/2022        CARDIAC PROFILE - LAST 2  Lab Results   Component Value Date    BNP 13 12/19/2022    BNP 15 09/22/2022     (H) 06/12/2020     (H) 06/12/2020    CPKMB 5.4 06/11/2020    TROPONINI 0.095 (H) 10/10/2022    TROPONINI <0.030 10/09/2022    TROPONINIHS 5.4 12/22/2022        COAGULATION - LAST 2  Lab Results   Component Value Date    LABPT 13.2 10/08/2022    LABPT 12.8 10/07/2022    INR 1.1 10/08/2022    INR 1.0 10/07/2022    APTT  58.5 (H) 10/09/2022    APTT 58.3 (H) 10/09/2022       ENDOCRINE & PSA - LAST 2  Lab Results   Component Value Date    TSH 0.990 08/24/2023    TSH 1.24 09/22/2022        ECHOCARDIOGRAM RESULTS  Results for orders placed during the hospital encounter of 10/07/22    Echo    Interpretation Summary  · The left ventricle is normal in size with mild concentric hypertrophy and normal systolic function.  · The estimated PA systolic pressure is 21 mmHg.  · Normal left ventricular diastolic function.  · Normal right ventricular size with normal right ventricular systolic function.  · Normal central venous pressure (3 mmHg).  · Mild tricuspid regurgitation.  · The estimated ejection fraction is 55%.  · Mild left atrial enlargement.      CURRENT/PREVIOUS VISIT EKG  Results for orders placed or performed during the hospital encounter of 12/22/22   EKG 12-lead    Collection Time: 12/22/22  6:29 PM    Narrative    Test Reason : R10.13,    Vent. Rate : 084 BPM     Atrial Rate : 084 BPM     P-R Int : 102 ms          QRS Dur : 100 ms      QT Int : 380 ms       P-R-T Axes : 049 000 017 degrees     QTc Int : 449 ms    Sinus rhythm with short NJ with occasional Premature ventricular complexes  Otherwise normal ECG  When compared with ECG of 10-OCT-2022 06:13,  Premature ventricular complexes are now Present  NJ interval has decreased  Nonspecific T wave abnormality no longer evident in Lateral leads  QT has lengthened  Confirmed by David KNUTSON, Jose Rafael MURILLO ([id number]) on 12/23/2022 5:35:49 PM    Referred By: [email or web address]   SELF           Confirmed By:Jose Rafael Hernandez MD     No valid procedures specified.   Results for orders placed during the hospital encounter of 10/07/22    Nuclear Stress Test    Interpretation Summary    The EKG portion of this study is abnormal but not diagnostic.    The patient reported no chest pain during the stress test.    During stress, frequent PVCs are noted.    The nuclear portion of this study will be reported  separately.    No valid procedures specified.    PHYSICAL EXAM  CONSTITUTIONAL: Well built, well nourished in no apparent distress  NECK: no carotid bruit, no JVD  LUNGS: CTA  CHEST WALL: no tenderness  HEART: regular rate and rhythm, S1, S2 normal, no murmur, click, rub or gallop   ABDOMEN: soft, non-tender; bowel sounds normal;   EXTREMITIES: Extremities normal, no edema  NEURO: AAO X 3    I HAVE REVIEWED :    The vital signs, nurses notes, and all the pertinent radiology and labs.        Current Outpatient Medications   Medication Instructions    aspirin (ECOTRIN) 81 mg, Oral, Daily    clopidogreL (PLAVIX) 75 mg, Oral, Daily    furosemide (LASIX) 20 mg, Oral, Daily    isosorbide mononitrate (IMDUR) 30 mg, Oral, Daily    losartan (COZAAR) 50 mg, Oral, Daily    metoprolol succinate (TOPROL-XL) 50 mg, Oral, Daily    pantoprazole (PROTONIX) 20 mg, Oral, Before breakfast    potassium chloride (KLOR-CON) 8 MEQ TbSR 8 mEq, Oral, Daily    rosuvastatin (CRESTOR) 40 mg, Oral, Nightly    traZODone (DESYREL) 150 mg, Oral, Nightly          Assessment & Plan     Essential hypertension  BP stable. Continue current medication regimen.  Monitor BP at home.  Keep log and upload to portal.  Continue low sodium heart healthy diet.     Coronary artery disease involving native coronary artery of native heart with unstable angina pectoris s/p PCI  Denies anginal equivalent symptoms.  Continue low sodium heart healthy diet. Continue DAPT.  Continue statin therapy.  LDL 90.  Increase rosuvastatin 40 mg daily.  Goal LDL <70.    Mixed hyperlipidemia  Goal LDL <70.  Increase Crestor to 40 mg daily.     Cigarette nicotine dependence with nicotine-induced disorder  Patient stopped smoking last year.  Continue cessation.           No follow-ups on file.

## 2023-11-07 NOTE — ASSESSMENT & PLAN NOTE
BP stable. Continue current medication regimen.  Monitor BP at home.  Keep log and upload to portal.  Continue low sodium heart healthy diet.

## 2023-11-07 NOTE — ASSESSMENT & PLAN NOTE
Denies anginal equivalent symptoms.  Continue low sodium heart healthy diet. Continue DAPT.  Continue statin therapy.  LDL 90.  Increase rosuvastatin 40 mg daily.  Goal LDL <70.

## 2024-04-03 DIAGNOSIS — I25.110 CORONARY ARTERY DISEASE INVOLVING NATIVE CORONARY ARTERY OF NATIVE HEART WITH UNSTABLE ANGINA PECTORIS: ICD-10-CM

## 2024-04-04 RX ORDER — ISOSORBIDE MONONITRATE 30 MG/1
30 TABLET, EXTENDED RELEASE ORAL
Qty: 90 TABLET | Refills: 1 | Status: SHIPPED | OUTPATIENT
Start: 2024-04-04

## 2024-04-04 RX ORDER — CLOPIDOGREL BISULFATE 75 MG/1
75 TABLET ORAL
Qty: 90 TABLET | Refills: 1 | Status: SHIPPED | OUTPATIENT
Start: 2024-04-04

## 2024-04-06 DIAGNOSIS — F51.01 PRIMARY INSOMNIA: ICD-10-CM

## 2024-04-08 DIAGNOSIS — F51.01 PRIMARY INSOMNIA: ICD-10-CM

## 2024-04-10 RX ORDER — TRAZODONE HYDROCHLORIDE 150 MG/1
150 TABLET ORAL
Qty: 90 TABLET | Refills: 1 | Status: SHIPPED | OUTPATIENT
Start: 2024-04-10

## 2024-04-10 RX ORDER — TRAZODONE HYDROCHLORIDE 150 MG/1
150 TABLET ORAL NIGHTLY
Qty: 90 TABLET | Refills: 1 | OUTPATIENT
Start: 2024-04-10

## 2024-05-02 DIAGNOSIS — K21.9 GASTROESOPHAGEAL REFLUX DISEASE, UNSPECIFIED WHETHER ESOPHAGITIS PRESENT: ICD-10-CM

## 2024-05-02 RX ORDER — PANTOPRAZOLE SODIUM 20 MG/1
20 TABLET, DELAYED RELEASE ORAL
Qty: 90 TABLET | Refills: 1 | Status: SHIPPED | OUTPATIENT
Start: 2024-05-02

## 2024-06-10 ENCOUNTER — OFFICE VISIT (OUTPATIENT)
Dept: FAMILY MEDICINE | Facility: CLINIC | Age: 39
End: 2024-06-10
Payer: COMMERCIAL

## 2024-06-10 VITALS
DIASTOLIC BLOOD PRESSURE: 78 MMHG | SYSTOLIC BLOOD PRESSURE: 118 MMHG | OXYGEN SATURATION: 96 % | WEIGHT: 224 LBS | HEART RATE: 85 BPM | BODY MASS INDEX: 32.07 KG/M2 | HEIGHT: 70 IN

## 2024-06-10 DIAGNOSIS — E66.09 CLASS 1 OBESITY DUE TO EXCESS CALORIES WITH SERIOUS COMORBIDITY AND BODY MASS INDEX (BMI) OF 32.0 TO 32.9 IN ADULT: ICD-10-CM

## 2024-06-10 DIAGNOSIS — E78.2 MIXED HYPERLIPIDEMIA: ICD-10-CM

## 2024-06-10 DIAGNOSIS — I25.110 CORONARY ARTERY DISEASE INVOLVING NATIVE CORONARY ARTERY OF NATIVE HEART WITH UNSTABLE ANGINA PECTORIS: ICD-10-CM

## 2024-06-10 DIAGNOSIS — Z00.00 ANNUAL PHYSICAL EXAM: Primary | ICD-10-CM

## 2024-06-10 DIAGNOSIS — Z13.1 SCREENING FOR DIABETES MELLITUS: ICD-10-CM

## 2024-06-10 DIAGNOSIS — I10 ESSENTIAL HYPERTENSION: ICD-10-CM

## 2024-06-10 PROCEDURE — 4010F ACE/ARB THERAPY RXD/TAKEN: CPT | Mod: CPTII,S$GLB,, | Performed by: NURSE PRACTITIONER

## 2024-06-10 PROCEDURE — 3008F BODY MASS INDEX DOCD: CPT | Mod: CPTII,S$GLB,, | Performed by: NURSE PRACTITIONER

## 2024-06-10 PROCEDURE — 3074F SYST BP LT 130 MM HG: CPT | Mod: CPTII,S$GLB,, | Performed by: NURSE PRACTITIONER

## 2024-06-10 PROCEDURE — 99999 PR PBB SHADOW E&M-EST. PATIENT-LVL III: CPT | Mod: PBBFAC,,, | Performed by: NURSE PRACTITIONER

## 2024-06-10 PROCEDURE — 1160F RVW MEDS BY RX/DR IN RCRD: CPT | Mod: CPTII,S$GLB,, | Performed by: NURSE PRACTITIONER

## 2024-06-10 PROCEDURE — 3078F DIAST BP <80 MM HG: CPT | Mod: CPTII,S$GLB,, | Performed by: NURSE PRACTITIONER

## 2024-06-10 PROCEDURE — 99396 PREV VISIT EST AGE 40-64: CPT | Mod: S$GLB,,, | Performed by: NURSE PRACTITIONER

## 2024-06-10 PROCEDURE — 1159F MED LIST DOCD IN RCRD: CPT | Mod: CPTII,S$GLB,, | Performed by: NURSE PRACTITIONER

## 2024-06-10 RX ORDER — SEMAGLUTIDE 0.5 MG/.5ML
0.5 INJECTION, SOLUTION SUBCUTANEOUS
Qty: 2 ML | Refills: 2 | Status: SHIPPED | OUTPATIENT
Start: 2024-07-11

## 2024-06-10 RX ORDER — SEMAGLUTIDE 0.25 MG/.5ML
0.25 INJECTION, SOLUTION SUBCUTANEOUS
Qty: 2 ML | Refills: 0 | Status: SHIPPED | OUTPATIENT
Start: 2024-06-10 | End: 2024-07-10

## 2024-06-10 NOTE — PROGRESS NOTES
SUBJECTIVE:      Patient ID: Irving Tanner is a 39 y.o. male.    Chief Complaint: Annual Exam    39-year-old male presents to the clinic for an annual exam.  Past medical history significant for CAD, hypertension, hyperlipidemia, GERD, fatty liver, and insomnia.  He is followed by cardiology for CAD, stents in LAD and RCA in Oct 2022. He is on dual antiplatelet therapy with Plavix and Aspirin. He is former smoker, stopped after stent placement. Denies alcohol and illicit drug use. He is doing well on his medications and reports compliance. Vision is stable. Just received dental insurance, plans to see an dentist soon. He tries to exercise, mostly walking. Reports difficulty losing weight, interested in semaglutide. Denies hx of pancreatitis or thyroid cancer/tumors. Denies chest pain and SOB.        Family History   Problem Relation Name Age of Onset    Hypertension Mother Kathi vazquez     Diabetes Mother Kathi vazquez     Thyroid disease Mother Kathi vazquez     No Known Problems Father        Social History     Socioeconomic History    Marital status:     Number of children: 1   Tobacco Use    Smoking status: Former     Current packs/day: 1.00     Average packs/day: 1 pack/day for 15.0 years (15.0 ttl pk-yrs)     Types: Cigarettes    Smokeless tobacco: Never   Substance and Sexual Activity    Alcohol use: No     Comment: occasionally    Drug use: No    Sexual activity: Yes     Partners: Female     Birth control/protection: None     Social Determinants of Health     Financial Resource Strain: Patient Declined (4/9/2024)    Overall Financial Resource Strain (CARDIA)     Difficulty of Paying Living Expenses: Patient declined   Food Insecurity: No Food Insecurity (4/9/2024)    Hunger Vital Sign     Worried About Running Out of Food in the Last Year: Never true     Ran Out of Food in the Last Year: Never true   Transportation Needs: No Transportation Needs (4/9/2024)    PRAPARE - Transportation     Lack of  Transportation (Medical): No     Lack of Transportation (Non-Medical): No   Physical Activity: Insufficiently Active (4/9/2024)    Exercise Vital Sign     Days of Exercise per Week: 3 days     Minutes of Exercise per Session: 30 min   Stress: No Stress Concern Present (4/9/2024)    [place] citizen [place] [employer] [facility] - Occupational Stress Questionnaire     Feeling of Stress : Only a little   Housing Stability: Low Risk  (4/9/2024)    Housing Stability Vital Sign     Unable to Pay for Housing in the Last Year: No     Number of Places Lived in the Last Year: 1     Unstable Housing in the Last Year: No     Current Outpatient Medications   Medication Sig Dispense Refill    clopidogreL (PLAVIX) 75 mg tablet TAKE 1 TABLET(75 MG) BY MOUTH EVERY DAY 90 tablet 1    furosemide (LASIX) 20 MG tablet Take 1 tablet (20 mg total) by mouth once daily. 30 tablet 11    isosorbide mononitrate (IMDUR) 30 MG 24 hr tablet TAKE 1 TABLET(30 MG) BY MOUTH EVERY DAY 90 tablet 1    losartan (COZAAR) 50 MG tablet Take 1 tablet (50 mg total) by mouth once daily. 90 tablet 3    metoprolol succinate (TOPROL-XL) 25 MG 24 hr tablet Take 2 tablets (50 mg total) by mouth once daily. 270 tablet 3    pantoprazole (PROTONIX) 20 MG tablet Take 1 tablet (20 mg total) by mouth before breakfast. 90 tablet 1    potassium chloride (KLOR-CON) 8 MEQ TbSR Take 1 tablet (8 mEq total) by mouth once daily. 30 tablet 11    rosuvastatin (CRESTOR) 40 MG Tab Take 1 tablet (40 mg total) by mouth every evening. 90 tablet 3    traZODone (DESYREL) 150 MG tablet TAKE 1 TABLET(150 MG) BY MOUTH EVERY EVENING 90 tablet 1    aspirin (ECOTRIN) 81 MG EC tablet Take 1 tablet (81 mg total) by mouth once daily. 90 tablet 0    semaglutide, weight loss, (WEGOVY) 0.25 mg/0.5 mL PnIj Inject 0.25 mg into the skin every 7 days. 2 mL 0    [START ON 7/11/2024] semaglutide, weight loss, (WEGOVY) 0.5 mg/0.5 mL PnIj Inject 0.5 mg into the skin every 7 days. 2 mL 2     No current  facility-administered medications for this visit.     Review of patient's allergies indicates:   Allergen Reactions    Motrin [ibuprofen]      swelling      Past Medical History:   Diagnosis Date    Cigarette nicotine dependence with nicotine-induced disorder 9/7/2022    Hyperlipidemia     Hypertension     Secondary polycythemia 11/6/2022     Past Surgical History:   Procedure Laterality Date    APPENDECTOMY      FOOT FRACTURE SURGERY      FRACTURE SURGERY      Incision and Drainage of Perirectal Abscess  06/22/2018        LEFT HEART CATHETERIZATION Left 10/09/2022    Procedure: Left heart cath;  Surgeon: Jose Rafael Hernandez MD;  Location: [facility] CATH/EP LAB;  Service: Cardiology;  Laterality: Left;       Review of Systems   Constitutional:  Positive for unexpected weight change. Negative for activity change, appetite change, chills, diaphoresis, fatigue and fever.   HENT:  Negative for congestion, ear pain, hearing loss, rhinorrhea, sinus pressure, sore throat, trouble swallowing and voice change.    Eyes:  Negative for pain, discharge and visual disturbance.   Respiratory:  Negative for cough, chest tightness, shortness of breath and wheezing.    Cardiovascular:  Negative for chest pain and palpitations.   Gastrointestinal:  Negative for abdominal pain, blood in stool, constipation, diarrhea, nausea and vomiting.   Endocrine: Negative for polydipsia and polyuria.   Genitourinary:  Negative for difficulty urinating, flank pain, frequency, hematuria and urgency.   Musculoskeletal:  Negative for arthralgias, back pain, joint swelling and neck pain.   Skin:  Negative for color change and rash.   Neurological:  Negative for dizziness, seizures, syncope, weakness, numbness and headaches.   Hematological:  Negative for adenopathy.   Psychiatric/Behavioral:  Negative for confusion, dysphoric mood and sleep disturbance. The patient is not nervous/anxious.       OBJECTIVE:      Vitals:    06/10/24 1446   BP: 118/78  "  BP Location: Left arm   Patient Position: Sitting   BP Method: Large (Manual)   Pulse: 85   SpO2: 96%   Weight: 101.6 kg (224 lb)   Height: 5' 10" (1.778 m)     Physical Exam  Vitals and nursing note reviewed.   Constitutional:       General: He is awake. He is not in acute distress.     Appearance: He is well-developed and well-groomed. He is obese. He is not ill-appearing, toxic-appearing or diaphoretic.   HENT:      Head: Normocephalic and atraumatic.      Right Ear: Tympanic membrane, ear canal and external ear normal.      Left Ear: Tympanic membrane, ear canal and external ear normal.      Nose: Nose normal.      Mouth/Throat:      Lips: Pink.      Mouth: Mucous membranes are moist.      Dentition: Abnormal dentition.      Tongue: Tongue does not deviate from midline.      Pharynx: Oropharynx is clear. Uvula midline. No pharyngeal swelling, oropharyngeal exudate, posterior oropharyngeal erythema or uvula swelling.   Eyes:      General: Lids are normal. Gaze aligned appropriately.      Conjunctiva/sclera: Conjunctivae normal.      Right eye: Right conjunctiva is not injected.      Left eye: Left conjunctiva is not injected.      Pupils: Pupils are equal, round, and reactive to light.   Cardiovascular:      Rate and Rhythm: Normal rate and regular rhythm.      Pulses: Normal pulses.      Heart sounds: Normal heart sounds, S1 normal and S2 normal. No murmur heard.     No friction rub. No gallop.   Pulmonary:      Effort: Pulmonary effort is normal. No respiratory distress.      Breath sounds: Normal breath sounds. No stridor. No decreased breath sounds, wheezing, rhonchi or rales.   Abdominal:      General: There is no distension.      Palpations: Abdomen is soft.      Tenderness: There is no abdominal tenderness. There is no right CVA tenderness, left CVA tenderness, guarding or rebound.   Musculoskeletal:      Cervical back: Neck supple.      Right lower leg: No edema.      Left lower leg: No edema. "   Lymphadenopathy:      Cervical: No cervical adenopathy.   Skin:     General: Skin is warm and dry.      Capillary Refill: Capillary refill takes less than 2 seconds.      Findings: No erythema or rash.   Neurological:      Mental Status: He is alert and oriented to person, place, and time. Mental status is at baseline.   Psychiatric:         Attention and Perception: Attention normal.         Mood and Affect: Mood normal.         Speech: Speech normal.         Behavior: Behavior normal. Behavior is cooperative.         Thought Content: Thought content normal.         Judgment: Judgment normal.        No visits with results within 8 Month(s) from this visit.   Latest known visit with results is:   Lab Visit on 08/24/2023   Component Date Value Ref Range Status    Sodium 08/24/2023 136  136 - 145 mmol/L Final    Potassium 08/24/2023 3.8  3.5 - 5.1 mmol/L Final    Chloride 08/24/2023 106  95 - 110 mmol/L Final    CO2 08/24/2023 23  23 - 29 mmol/L Final    Glucose 08/24/2023 97  70 - 110 mg/dL Final    BUN 08/24/2023 17  6 - 20 mg/dL Final    Creatinine 08/24/2023 1.0  0.5 - 1.4 mg/dL Final    Calcium 08/24/2023 8.4 (L)  8.7 - 10.5 mg/dL Final    Anion Gap 08/24/2023 7 (L)  8 - 16 mmol/L Final    eGFR 08/24/2023 >60.0  >60 mL/min/1.73 m^2 Final    Sodium 08/24/2023 136  136 - 145 mmol/L Final    Potassium 08/24/2023 3.8  3.5 - 5.1 mmol/L Final    Chloride 08/24/2023 106  95 - 110 mmol/L Final    CO2 08/24/2023 23  23 - 29 mmol/L Final    Glucose 08/24/2023 97  70 - 110 mg/dL Final    BUN 08/24/2023 17  6 - 20 mg/dL Final    Creatinine 08/24/2023 1.0  0.5 - 1.4 mg/dL Final    Calcium 08/24/2023 8.4 (L)  8.7 - 10.5 mg/dL Final    Total Protein 08/24/2023 7.4  6.0 - 8.4 g/dL Final    Albumin 08/24/2023 3.8  3.5 - 5.2 g/dL Final    Total Bilirubin 08/24/2023 0.7  0.1 - 1.0 mg/dL Final    Comment: For infants and newborns, interpretation of results should be based  on gestational age, weight and in agreement with  clinical  observations.    Premature Infant recommended reference ranges:  Up to 24 hours.............<8.0 mg/dL  Up to 48 hours............<12.0 mg/dL  3-5 days..................<15.0 mg/dL  6-29 days.................<15.0 mg/dL      Alkaline Phosphatase 08/24/2023 48 (L)  55 - 135 U/L Final    AST 08/24/2023 28  10 - 40 U/L Final    ALT 08/24/2023 67 (H)  10 - 44 U/L Final    eGFR 08/24/2023 >60.0  >60 mL/min/1.73 m^2 Final    Anion Gap 08/24/2023 7 (L)  8 - 16 mmol/L Final    Cholesterol 08/24/2023 172  120 - 199 mg/dL Final    Comment: The National Cholesterol Education Program (NCEP) has set the  following guidelines (reference ranges) for Cholesterol:  Optimal.....................<200 mg/dL  Borderline High.............200-239 mg/dL  High........................> or = 240 mg/dL      Triglycerides 08/24/2023 127  30 - 150 mg/dL Final    Comment: The National Cholesterol Education Program (NCEP) has set the  following guidelines (reference values) for triglycerides:  Normal......................<150 mg/dL  Borderline High.............150-199 mg/dL  High........................200-499 mg/dL      HDL 08/24/2023 56  40 - 75 mg/dL Final    Comment: The National Cholesterol Education Program (NCEP) has set the  following guidelines (reference values) for HDL Cholesterol:  Low...............<40 mg/dL  Optimal...........>60 mg/dL      LDL Cholesterol 08/24/2023 90.6  63.0 - 159.0 mg/dL Final    Comment: The National Cholesterol Education Program (NCEP) has set the  following guidelines (reference values) for LDL Cholesterol:  Optimal.......................<130 mg/dL  Borderline High...............130-159 mg/dL  High..........................160-189 mg/dL  Very High.....................>190 mg/dL      HDL/Cholesterol Ratio 08/24/2023 32.6  20.0 - 50.0 % Final    Total Cholesterol/HDL Ratio 08/24/2023 3.1  2.0 - 5.0 Final    Non-HDL Cholesterol 08/24/2023 116  mg/dL Final    Comment: Risk category and Non-HDL cholesterol  goals:  Coronary heart disease (CHD)or equivalent (10-year risk of CHD >20%):  Non-HDL cholesterol goal     <130 mg/dL  Two or more CHD risk factors and 10-year risk of CHD <= 20%:  Non-HDL cholesterol goal     <160 mg/dL  0 to 1 CHD risk factor:  Non-HDL cholesterol goal     <190 mg/dL      WBC 08/24/2023 8.84  3.90 - 12.70 K/uL Final    RBC 08/24/2023 5.46  4.60 - 6.20 M/uL Final    Hemoglobin 08/24/2023 16.1  14.0 - 18.0 g/dL Final    Hematocrit 08/24/2023 47.2  40.0 - 54.0 % Final    MCV 08/24/2023 86  82 - 98 fL Final    MCH 08/24/2023 29.5  27.0 - 31.0 pg Final    MCHC 08/24/2023 34.1  32.0 - 36.0 g/dL Final    RDW 08/24/2023 14.0  11.5 - 14.5 % Final    Platelets 08/24/2023 251  150 - 450 K/uL Final    MPV 08/24/2023 9.3  9.2 - 12.9 fL Final    Immature Granulocytes 08/24/2023 0.3  0.0 - 0.5 % Final    Gran # (ANC) 08/24/2023 5.8  1.8 - 7.7 K/uL Final    Immature Grans (Abs) 08/24/2023 0.03  0.00 - 0.04 K/uL Final    Comment: Mild elevation in immature granulocytes is non specific and   can be seen in a variety of conditions including stress response,   acute inflammation, trauma and pregnancy. Correlation with other   laboratory and clinical findings is essential.      Lymph # 08/24/2023 2.2  1.0 - 4.8 K/uL Final    Mono # 08/24/2023 0.6  0.3 - 1.0 K/uL Final    Eos # 08/24/2023 0.1  0.0 - 0.5 K/uL Final    Baso # 08/24/2023 0.06  0.00 - 0.20 K/uL Final    nRBC 08/24/2023 0  0 /100 WBC Final    Gran % 08/24/2023 65.7  38.0 - 73.0 % Final    Lymph % 08/24/2023 25.2  18.0 - 48.0 % Final    Mono % 08/24/2023 6.7  4.0 - 15.0 % Final    Eosinophil % 08/24/2023 1.4  0.0 - 8.0 % Final    Basophil % 08/24/2023 0.7  0.0 - 1.9 % Final    Differential Method 08/24/2023 Automated   Final    TSH 08/24/2023 0.990  0.340 - 5.600 uIU/mL Final     Assessment:       1. Annual physical exam    2. Essential hypertension    3. Mixed hyperlipidemia    4. Coronary artery disease involving native coronary artery of native heart  with unstable angina pectoris    5. Screening for diabetes mellitus    6. Class 1 obesity due to excess calories with serious comorbidity and body mass index (BMI) of 32.0 to 32.9 in adult        Plan:       Annual physical exam  Counseled on age and gender appropriate medical preventative services, including cancer screenings, immunizations, overall nutritional health, need for a consistent exercise regimen and an overall push towards maintaining a vigorous and active lifestyle.  Declined overdue immunizations.   -     CBC Auto Differential; Future; Expected date: 06/10/2024  -     Comprehensive Metabolic Panel; Future; Expected date: 06/10/2024  -     Lipid Panel; Future; Expected date: 06/10/2024  -     TSH; Future; Expected date: 06/10/2024  -     Microalbumin/Creatinine Ratio, Urine; Future; Expected date: 06/10/2024  -     Urinalysis; Future; Expected date: 06/10/2024  -     Hemoglobin A1C; Future; Expected date: 06/10/2024    Essential hypertension  Stable, BP controlled, continue Losartan 50 mg, metoprolol succinate 25 mg, lasix 20 mg, and Imdur 30 mg. Reduce the amount of salt in your diet; Lose weight; Avoid drinking too much alcohol; Exercise at least 30 minutes per day most days of the week.   -     Comprehensive Metabolic Panel; Future; Expected date: 06/10/2024  -     Lipid Panel; Future; Expected date: 06/10/2024  -     TSH; Future; Expected date: 06/10/2024  -     Microalbumin/Creatinine Ratio, Urine; Future; Expected date: 06/10/2024  -     Urinalysis; Future; Expected date: 06/10/2024    Mixed hyperlipidemia  LDL not at goal 9 months ago, needs to be <70. Continue Crestor 40 mg. Limit red meat, butter, fried foods, cheese, and other foods that have a lot of saturated fat. Consume more: lean meats, fish, fruits, vegetables, whole grains, beans, lentils, and nuts.  Weight loss, and 30-45 min of cardiovascular exercise daily.  -     Comprehensive Metabolic Panel; Future; Expected date: 06/10/2024  -      Lipid Panel; Future; Expected date: 06/10/2024  -     TSH; Future; Expected date: 06/10/2024    Coronary artery disease involving native coronary artery of native heart with unstable angina pectoris  Stable, denies angina or dyspnea. Continue Plavix and ASA 81 mg. Continue BP and cholesterol meds. Managed by Cardiology.   -     CBC Auto Differential; Future; Expected date: 06/10/2024  -     Comprehensive Metabolic Panel; Future; Expected date: 06/10/2024  -     Lipid Panel; Future; Expected date: 06/10/2024  -     TSH; Future; Expected date: 06/10/2024  -     Hemoglobin A1C; Future; Expected date: 06/10/2024    Screening for diabetes mellitus  -     Comprehensive Metabolic Panel; Future; Expected date: 06/10/2024  -     Hemoglobin A1C; Future; Expected date: 06/10/2024    Class 1 obesity due to excess calories with serious comorbidity and body mass index (BMI) of 32.0 to 32.9 in adult  Start Wegovy. Follow-up in 2 months for weight check. Excessive time spent in sedentary behavior, such as sitting, is associated with deleterious health outcomes including abnormal glucose metabolism and increased mortality. Reducing sedentary time, independent of physical activity, has known cardiovascular, metabolic, and functional benefits in all adults. Any amount of exercise is better than being sedentary.  -     semaglutide, weight loss, (WEGOVY) 0.25 mg/0.5 mL PnIj; Inject 0.25 mg into the skin every 7 days.  Dispense: 2 mL; Refill: 0  -     semaglutide, weight loss, (WEGOVY) 0.5 mg/0.5 mL PnIj; Inject 0.5 mg into the skin every 7 days.  Dispense: 2 mL; Refill: 2    This note was created using [employer] voice recognition software that occasionally misinterprets phrases or words.     I spent a total of 23 minutes on the day of the visit.This includes face to face time and non-face to face time preparing to see the patient (eg, review of tests), obtaining and/or reviewing separately obtained history, documenting clinical  information in the electronic or other health record, independently interpreting results and communicating results to the patient/family/caregiver, or care coordinator.    Follow up in about 2 months (around 8/10/2024).          6/10/2024 AGA Maradiaga, NAVINP

## 2024-06-14 ENCOUNTER — PATIENT MESSAGE (OUTPATIENT)
Dept: FAMILY MEDICINE | Facility: CLINIC | Age: 39
End: 2024-06-14
Payer: COMMERCIAL

## 2024-06-26 ENCOUNTER — LAB VISIT (OUTPATIENT)
Dept: LAB | Facility: HOSPITAL | Age: 39
End: 2024-06-26
Attending: NURSE PRACTITIONER
Payer: COMMERCIAL

## 2024-06-26 DIAGNOSIS — Z13.1 SCREENING FOR DIABETES MELLITUS: ICD-10-CM

## 2024-06-26 DIAGNOSIS — Z00.00 ANNUAL PHYSICAL EXAM: ICD-10-CM

## 2024-06-26 DIAGNOSIS — E78.2 MIXED HYPERLIPIDEMIA: ICD-10-CM

## 2024-06-26 DIAGNOSIS — I25.110 CORONARY ARTERY DISEASE INVOLVING NATIVE CORONARY ARTERY OF NATIVE HEART WITH UNSTABLE ANGINA PECTORIS: ICD-10-CM

## 2024-06-26 DIAGNOSIS — I10 ESSENTIAL HYPERTENSION: ICD-10-CM

## 2024-06-26 LAB
ALBUMIN SERPL BCP-MCNC: 3.9 G/DL (ref 3.5–5.2)
ALBUMIN/CREAT UR: 2.5 UG/MG (ref 0–30)
ALP SERPL-CCNC: 40 U/L (ref 55–135)
ALT SERPL W/O P-5'-P-CCNC: 50 U/L (ref 10–44)
ANION GAP SERPL CALC-SCNC: 11 MMOL/L (ref 8–16)
AST SERPL-CCNC: 28 U/L (ref 10–40)
BASOPHILS # BLD AUTO: 0.08 K/UL (ref 0–0.2)
BASOPHILS NFR BLD: 1.4 % (ref 0–1.9)
BILIRUB SERPL-MCNC: 0.6 MG/DL (ref 0.1–1)
BILIRUB UR QL STRIP: NEGATIVE
BUN SERPL-MCNC: 9 MG/DL (ref 6–20)
CALCIUM SERPL-MCNC: 9.1 MG/DL (ref 8.7–10.5)
CHLORIDE SERPL-SCNC: 105 MMOL/L (ref 95–110)
CHOLEST SERPL-MCNC: 286 MG/DL (ref 120–199)
CHOLEST/HDLC SERPL: 7.3 {RATIO} (ref 2–5)
CLARITY UR REFRACT.AUTO: CLEAR
CO2 SERPL-SCNC: 21 MMOL/L (ref 23–29)
COLOR UR AUTO: YELLOW
CREAT SERPL-MCNC: 1.2 MG/DL (ref 0.5–1.4)
CREAT UR-MCNC: 275 MG/DL (ref 23–375)
DIFFERENTIAL METHOD BLD: ABNORMAL
EOSINOPHIL # BLD AUTO: 0.1 K/UL (ref 0–0.5)
EOSINOPHIL NFR BLD: 1.9 % (ref 0–8)
ERYTHROCYTE [DISTWIDTH] IN BLOOD BY AUTOMATED COUNT: 14.5 % (ref 11.5–14.5)
EST. GFR  (NO RACE VARIABLE): >60 ML/MIN/1.73 M^2
ESTIMATED AVG GLUCOSE: 91 MG/DL (ref 68–131)
GLUCOSE SERPL-MCNC: 84 MG/DL (ref 70–110)
GLUCOSE UR QL STRIP: NEGATIVE
HBA1C MFR BLD: 4.8 % (ref 4–5.6)
HCT VFR BLD AUTO: 57.7 % (ref 40–54)
HDLC SERPL-MCNC: 39 MG/DL (ref 40–75)
HDLC SERPL: 13.6 % (ref 20–50)
HGB BLD-MCNC: 18.3 G/DL (ref 14–18)
HGB UR QL STRIP: NEGATIVE
IMM GRANULOCYTES # BLD AUTO: 0.01 K/UL (ref 0–0.04)
IMM GRANULOCYTES NFR BLD AUTO: 0.2 % (ref 0–0.5)
KETONES UR QL STRIP: ABNORMAL
LDLC SERPL CALC-MCNC: 200 MG/DL (ref 63–159)
LEUKOCYTE ESTERASE UR QL STRIP: NEGATIVE
LYMPHOCYTES # BLD AUTO: 1.9 K/UL (ref 1–4.8)
LYMPHOCYTES NFR BLD: 33.9 % (ref 18–48)
MCH RBC QN AUTO: 29.4 PG (ref 27–31)
MCHC RBC AUTO-ENTMCNC: 31.7 G/DL (ref 32–36)
MCV RBC AUTO: 93 FL (ref 82–98)
MICROALBUMIN UR DL<=1MG/L-MCNC: 7 UG/ML
MONOCYTES # BLD AUTO: 0.5 K/UL (ref 0.3–1)
MONOCYTES NFR BLD: 7.9 % (ref 4–15)
NEUTROPHILS # BLD AUTO: 3.1 K/UL (ref 1.8–7.7)
NEUTROPHILS NFR BLD: 54.7 % (ref 38–73)
NITRITE UR QL STRIP: NEGATIVE
NONHDLC SERPL-MCNC: 247 MG/DL
NRBC BLD-RTO: 0 /100 WBC
PH UR STRIP: 7 [PH] (ref 5–8)
PLATELET # BLD AUTO: 330 K/UL (ref 150–450)
PMV BLD AUTO: 9.9 FL (ref 9.2–12.9)
POTASSIUM SERPL-SCNC: 4.2 MMOL/L (ref 3.5–5.1)
PROT SERPL-MCNC: 7.2 G/DL (ref 6–8.4)
PROT UR QL STRIP: ABNORMAL
RBC # BLD AUTO: 6.22 M/UL (ref 4.6–6.2)
SODIUM SERPL-SCNC: 137 MMOL/L (ref 136–145)
SP GR UR STRIP: 1.03 (ref 1–1.03)
TRIGL SERPL-MCNC: 235 MG/DL (ref 30–150)
TSH SERPL DL<=0.005 MIU/L-ACNC: 1.32 UIU/ML (ref 0.4–4)
URN SPEC COLLECT METH UR: ABNORMAL
WBC # BLD AUTO: 5.72 K/UL (ref 3.9–12.7)

## 2024-06-26 PROCEDURE — 82570 ASSAY OF URINE CREATININE: CPT | Performed by: NURSE PRACTITIONER

## 2024-06-26 PROCEDURE — 81003 URINALYSIS AUTO W/O SCOPE: CPT | Performed by: NURSE PRACTITIONER

## 2024-06-26 PROCEDURE — 80061 LIPID PANEL: CPT | Performed by: NURSE PRACTITIONER

## 2024-06-26 PROCEDURE — 82043 UR ALBUMIN QUANTITATIVE: CPT | Performed by: NURSE PRACTITIONER

## 2024-06-26 PROCEDURE — 84443 ASSAY THYROID STIM HORMONE: CPT | Performed by: NURSE PRACTITIONER

## 2024-06-26 PROCEDURE — 85025 COMPLETE CBC W/AUTO DIFF WBC: CPT | Performed by: NURSE PRACTITIONER

## 2024-06-26 PROCEDURE — 80053 COMPREHEN METABOLIC PANEL: CPT | Performed by: NURSE PRACTITIONER

## 2024-06-26 PROCEDURE — 83036 HEMOGLOBIN GLYCOSYLATED A1C: CPT | Performed by: NURSE PRACTITIONER

## 2024-06-26 PROCEDURE — 36415 COLL VENOUS BLD VENIPUNCTURE: CPT | Performed by: NURSE PRACTITIONER

## 2024-06-27 DIAGNOSIS — D75.1 POLYCYTHEMIA: Primary | ICD-10-CM

## 2024-08-09 ENCOUNTER — LAB VISIT (OUTPATIENT)
Dept: LAB | Facility: HOSPITAL | Age: 39
End: 2024-08-09
Attending: NURSE PRACTITIONER
Payer: COMMERCIAL

## 2024-08-09 DIAGNOSIS — D75.1 POLYCYTHEMIA: ICD-10-CM

## 2024-08-09 LAB
BASOPHILS # BLD AUTO: 0.06 K/UL (ref 0–0.2)
BASOPHILS NFR BLD: 0.9 % (ref 0–1.9)
DIFFERENTIAL METHOD BLD: ABNORMAL
EOSINOPHIL # BLD AUTO: 0.1 K/UL (ref 0–0.5)
EOSINOPHIL NFR BLD: 1.1 % (ref 0–8)
ERYTHROCYTE [DISTWIDTH] IN BLOOD BY AUTOMATED COUNT: 14.5 % (ref 11.5–14.5)
HCT VFR BLD AUTO: 53.9 % (ref 40–54)
HGB BLD-MCNC: 18.3 G/DL (ref 14–18)
IMM GRANULOCYTES # BLD AUTO: 0.02 K/UL (ref 0–0.04)
IMM GRANULOCYTES NFR BLD AUTO: 0.3 % (ref 0–0.5)
LYMPHOCYTES # BLD AUTO: 1.9 K/UL (ref 1–4.8)
LYMPHOCYTES NFR BLD: 27.4 % (ref 18–48)
MCH RBC QN AUTO: 30.5 PG (ref 27–31)
MCHC RBC AUTO-ENTMCNC: 34 G/DL (ref 32–36)
MCV RBC AUTO: 90 FL (ref 82–98)
MONOCYTES # BLD AUTO: 0.5 K/UL (ref 0.3–1)
MONOCYTES NFR BLD: 7.3 % (ref 4–15)
NEUTROPHILS # BLD AUTO: 4.4 K/UL (ref 1.8–7.7)
NEUTROPHILS NFR BLD: 63 % (ref 38–73)
NRBC BLD-RTO: 0 /100 WBC
PLATELET # BLD AUTO: 277 K/UL (ref 150–450)
PMV BLD AUTO: 10.1 FL (ref 9.2–12.9)
RBC # BLD AUTO: 6 M/UL (ref 4.6–6.2)
WBC # BLD AUTO: 7.02 K/UL (ref 3.9–12.7)

## 2024-08-09 PROCEDURE — 85025 COMPLETE CBC W/AUTO DIFF WBC: CPT | Performed by: NURSE PRACTITIONER

## 2024-08-09 PROCEDURE — 36415 COLL VENOUS BLD VENIPUNCTURE: CPT | Performed by: NURSE PRACTITIONER

## 2024-08-16 ENCOUNTER — TELEPHONE (OUTPATIENT)
Dept: PHARMACY | Facility: CLINIC | Age: 39
End: 2024-08-16
Payer: COMMERCIAL

## 2024-08-16 NOTE — TELEPHONE ENCOUNTER
[facility]/Population Health Chart Review & Patient Outreach Details For Medication Adherence Project    Reason for Outreach Encounter: 3rd Party payor non-compliance report ([employer], [employer], [employer], etc)    2.  Patient Outreach Method: Reviewed patient chart     3.   Medication in question:   Hyperlipidemia Medications               rosuvastatin (CRESTOR) 40 MG Tab Take 1 tablet (40 mg total) by mouth every evening.                LAST FILLED: 6/28/24 for 90 day supply    4.  Reviewed and or Updates Made To: Patient Chart    5. Outreach Outcomes and/or actions taken: Patient filled medication and is on track to be adherent    Additional Notes:

## 2024-08-23 ENCOUNTER — OFFICE VISIT (OUTPATIENT)
Dept: FAMILY MEDICINE | Facility: CLINIC | Age: 39
End: 2024-08-23
Payer: COMMERCIAL

## 2024-08-23 VITALS
DIASTOLIC BLOOD PRESSURE: 76 MMHG | OXYGEN SATURATION: 97 % | BODY MASS INDEX: 29.33 KG/M2 | SYSTOLIC BLOOD PRESSURE: 116 MMHG | HEART RATE: 74 BPM | TEMPERATURE: 98 F | WEIGHT: 204.38 LBS

## 2024-08-23 DIAGNOSIS — I25.110 CORONARY ARTERY DISEASE INVOLVING NATIVE CORONARY ARTERY OF NATIVE HEART WITH UNSTABLE ANGINA PECTORIS: ICD-10-CM

## 2024-08-23 DIAGNOSIS — I10 ESSENTIAL HYPERTENSION: Primary | ICD-10-CM

## 2024-08-23 DIAGNOSIS — K76.0 FATTY LIVER: ICD-10-CM

## 2024-08-23 DIAGNOSIS — E78.2 MIXED HYPERLIPIDEMIA: ICD-10-CM

## 2024-08-23 PROCEDURE — 99999 PR PBB SHADOW E&M-EST. PATIENT-LVL IV: CPT | Mod: PBBFAC,,, | Performed by: NURSE PRACTITIONER

## 2024-08-23 NOTE — PROGRESS NOTES
SUBJECTIVE:      Patient ID: Irving Tanner is a 39 y.o. male.    Chief Complaint: Follow-up    39-year-old male presents to the clinic for 2 month follow-up and lab review.    Patient's cholesterol was extremely elevated, , total cholesterol 286, triglycerides 235, and HDL low at 39.  He is prescribed Crestor 40 mg.  Patient restarted and reports compliance.  Tolerating well without side effects.    Slight elevation on his liver enzymes, ALT 50.  ALT improved from previous.  History of fatty liver.  Patient also has uncontrolled cholesterol and elevated triglycerides.    A1c was normal.  He was interested in weight loss and started on Wegovy due to not having diabetes.  Insurance denied Wegovy.  Weight reviewed.  Patient has lost 20 lb in the last 2 months which is related to the extensive work he has recently been doing at his job.    Blood pressure is stable with metoprolol succinate 25 mg, lasix 20 mg, and losartan 50 mg. Denies chest pain, SOB, and FIDELIA.    Healthcare maintenance is up to date. He is doing well today and offers no new complaints.         Family History   Problem Relation Name Age of Onset    Hypertension Mother Kathi vazquez     Diabetes Mother Kathi vazquez     Thyroid disease Mother Kathi vazquez     No Known Problems Father        Social History     Socioeconomic History    Marital status:     Number of children: 1   Tobacco Use    Smoking status: Former     Current packs/day: 1.00     Average packs/day: 1 pack/day for 15.0 years (15.0 ttl pk-yrs)     Types: Cigarettes    Smokeless tobacco: Never   Substance and Sexual Activity    Alcohol use: No     Comment: occasionally    Drug use: No    Sexual activity: Yes     Partners: Female     Birth control/protection: None     Social Determinants of Health     Financial Resource Strain: Patient Declined (4/9/2024)    Overall Financial Resource Strain (CARDIA)     Difficulty of Paying Living Expenses: Patient declined   Food  Insecurity: No Food Insecurity (4/9/2024)    Hunger Vital Sign     Worried About Running Out of Food in the Last Year: Never true     Ran Out of Food in the Last Year: Never true   Transportation Needs: No Transportation Needs (4/9/2024)    PRAPARE - Transportation     Lack of Transportation (Medical): No     Lack of Transportation (Non-Medical): No   Physical Activity: Insufficiently Active (4/9/2024)    Exercise Vital Sign     Days of Exercise per Week: 3 days     Minutes of Exercise per Session: 30 min   Stress: No Stress Concern Present (4/9/2024)    [employer] [place] [employer] - Occupational Stress Questionnaire     Feeling of Stress : Only a little   Housing Stability: Low Risk  (4/9/2024)    Housing Stability Vital Sign     Unable to Pay for Housing in the Last Year: No     Number of Places Lived in the Last Year: 1     Unstable Housing in the Last Year: No     Current Outpatient Medications   Medication Sig Dispense Refill    aspirin (ECOTRIN) 81 MG EC tablet Take 1 tablet (81 mg total) by mouth once daily. 90 tablet 0    clopidogreL (PLAVIX) 75 mg tablet TAKE 1 TABLET(75 MG) BY MOUTH EVERY DAY 90 tablet 1    furosemide (LASIX) 20 MG tablet Take 1 tablet (20 mg total) by mouth once daily. 30 tablet 11    isosorbide mononitrate (IMDUR) 30 MG 24 hr tablet TAKE 1 TABLET(30 MG) BY MOUTH EVERY DAY 90 tablet 1    losartan (COZAAR) 50 MG tablet Take 1 tablet (50 mg total) by mouth once daily. 90 tablet 3    metoprolol succinate (TOPROL-XL) 25 MG 24 hr tablet Take 2 tablets (50 mg total) by mouth once daily. 270 tablet 3    pantoprazole (PROTONIX) 20 MG tablet Take 1 tablet (20 mg total) by mouth before breakfast. 90 tablet 1    potassium chloride (KLOR-CON) 8 MEQ TbSR Take 1 tablet (8 mEq total) by mouth once daily. 30 tablet 11    rosuvastatin (CRESTOR) 40 MG Tab Take 1 tablet (40 mg total) by mouth every evening. 90 tablet 3    semaglutide, weight loss, (WEGOVY) 0.5 mg/0.5 mL PnNawaf Inject 0.5 mg into  the skin every 7 days. 2 mL 2    traZODone (DESYREL) 150 MG tablet TAKE 1 TABLET(150 MG) BY MOUTH EVERY EVENING 90 tablet 1     No current facility-administered medications for this visit.     Review of patient's allergies indicates:   Allergen Reactions    Motrin [ibuprofen]      swelling      Past Medical History:   Diagnosis Date    Cigarette nicotine dependence with nicotine-induced disorder 9/7/2022    Hyperlipidemia     Hypertension     Secondary polycythemia 11/6/2022     Past Surgical History:   Procedure Laterality Date    APPENDECTOMY      FOOT FRACTURE SURGERY      FRACTURE SURGERY      Incision and Drainage of Perirectal Abscess  06/22/2018        LEFT HEART CATHETERIZATION Left 10/09/2022    Procedure: Left heart cath;  Surgeon: Jose Rafael Hernandez MD;  Location: [facility] CATH/EP LAB;  Service: Cardiology;  Laterality: Left;       Review of Systems   Constitutional:  Negative for activity change, appetite change, chills, diaphoresis, fatigue, fever and unexpected weight change.   HENT:  Negative for hearing loss, rhinorrhea and trouble swallowing.    Eyes:  Positive for visual disturbance. Negative for discharge.   Respiratory:  Negative for chest tightness, shortness of breath and wheezing.    Cardiovascular:  Negative for chest pain and palpitations.   Gastrointestinal:  Negative for blood in stool, constipation, diarrhea and vomiting.   Endocrine: Negative for polydipsia and polyuria.   Genitourinary:  Negative for difficulty urinating, hematuria and urgency.   Musculoskeletal:  Negative for arthralgias, joint swelling and neck pain.   Neurological:  Negative for weakness and headaches.   Psychiatric/Behavioral:  Negative for confusion and dysphoric mood.       OBJECTIVE:      Vitals:    08/23/24 1123   BP: 116/76   BP Location: Right arm   Patient Position: Sitting   BP Method: Medium (Automatic)   Pulse: 74   Temp: 97.8 °F (36.6 °C)   TempSrc: Oral   SpO2: 97%   Weight: 92.7 kg (204 lb 6.4 oz)      Physical Exam  Vitals and nursing note reviewed.   Constitutional:       General: He is awake. He is not in acute distress.     Appearance: He is well-developed, well-groomed and overweight. He is not ill-appearing, toxic-appearing or diaphoretic.   HENT:      Head: Normocephalic and atraumatic.      Nose: Nose normal.   Eyes:      General: Lids are normal. Gaze aligned appropriately.      Conjunctiva/sclera: Conjunctivae normal.      Right eye: Right conjunctiva is not injected.      Left eye: Left conjunctiva is not injected.      Pupils: Pupils are equal, round, and reactive to light.   Cardiovascular:      Rate and Rhythm: Normal rate and regular rhythm.      Pulses: Normal pulses.      Heart sounds: Normal heart sounds, S1 normal and S2 normal. No murmur heard.     No friction rub. No gallop.   Pulmonary:      Effort: Pulmonary effort is normal. No respiratory distress.      Breath sounds: Normal breath sounds. No stridor. No decreased breath sounds, wheezing, rhonchi or rales.   Chest:      Chest wall: No tenderness.   Musculoskeletal:      Cervical back: Neck supple.      Right lower leg: No edema.      Left lower leg: No edema.   Lymphadenopathy:      Cervical: No cervical adenopathy.   Skin:     General: Skin is warm and dry.      Capillary Refill: Capillary refill takes less than 2 seconds.      Findings: No erythema or rash.   Neurological:      Mental Status: He is alert and oriented to person, place, and time. Mental status is at baseline.   Psychiatric:         Attention and Perception: Attention normal.         Mood and Affect: Mood normal.         Speech: Speech normal.         Behavior: Behavior normal. Behavior is cooperative.         Thought Content: Thought content normal.         Judgment: Judgment normal.        Lab Visit on 08/09/2024   Component Date Value Ref Range Status    WBC 08/09/2024 7.02  3.90 - 12.70 K/uL Final    RBC 08/09/2024 6.00  4.60 - 6.20 M/uL Final    Hemoglobin  08/09/2024 18.3 (H)  14.0 - 18.0 g/dL Final    Hematocrit 08/09/2024 53.9  40.0 - 54.0 % Final    MCV 08/09/2024 90  82 - 98 fL Final    MCH 08/09/2024 30.5  27.0 - 31.0 pg Final    MCHC 08/09/2024 34.0  32.0 - 36.0 g/dL Final    RDW 08/09/2024 14.5  11.5 - 14.5 % Final    Platelets 08/09/2024 277  150 - 450 K/uL Final    MPV 08/09/2024 10.1  9.2 - 12.9 fL Final    Immature Granulocytes 08/09/2024 0.3  0.0 - 0.5 % Final    Gran # (ANC) 08/09/2024 4.4  1.8 - 7.7 K/uL Final    Immature Grans (Abs) 08/09/2024 0.02  0.00 - 0.04 K/uL Final    Comment: Mild elevation in immature granulocytes is non specific and   can be seen in a variety of conditions including stress response,   acute inflammation, trauma and pregnancy. Correlation with other   laboratory and clinical findings is essential.      Lymph # 08/09/2024 1.9  1.0 - 4.8 K/uL Final    Mono # 08/09/2024 0.5  0.3 - 1.0 K/uL Final    Eos # 08/09/2024 0.1  0.0 - 0.5 K/uL Final    Baso # 08/09/2024 0.06  0.00 - 0.20 K/uL Final    nRBC 08/09/2024 0  0 /100 WBC Final    Gran % 08/09/2024 63.0  38.0 - 73.0 % Final    Lymph % 08/09/2024 27.4  18.0 - 48.0 % Final    Mono % 08/09/2024 7.3  4.0 - 15.0 % Final    Eosinophil % 08/09/2024 1.1  0.0 - 8.0 % Final    Basophil % 08/09/2024 0.9  0.0 - 1.9 % Final    Differential Method 08/09/2024 Automated   Final   Lab Visit on 06/26/2024   Component Date Value Ref Range Status    WBC 06/26/2024 5.72  3.90 - 12.70 K/uL Final    RBC 06/26/2024 6.22 (H)  4.60 - 6.20 M/uL Final    Hemoglobin 06/26/2024 18.3 (H)  14.0 - 18.0 g/dL Final    Hematocrit 06/26/2024 57.7 (H)  40.0 - 54.0 % Final    MCV 06/26/2024 93  82 - 98 fL Final    MCH 06/26/2024 29.4  27.0 - 31.0 pg Final    MCHC 06/26/2024 31.7 (L)  32.0 - 36.0 g/dL Final    RDW 06/26/2024 14.5  11.5 - 14.5 % Final    Platelets 06/26/2024 330  150 - 450 K/uL Final    MPV 06/26/2024 9.9  9.2 - 12.9 fL Final    Immature Granulocytes 06/26/2024 0.2  0.0 - 0.5 % Final    Gran # (ANC)  06/26/2024 3.1  1.8 - 7.7 K/uL Final    Immature Grans (Abs) 06/26/2024 0.01  0.00 - 0.04 K/uL Final    Comment: Mild elevation in immature granulocytes is non specific and   can be seen in a variety of conditions including stress response,   acute inflammation, trauma and pregnancy. Correlation with other   laboratory and clinical findings is essential.      Lymph # 06/26/2024 1.9  1.0 - 4.8 K/uL Final    Mono # 06/26/2024 0.5  0.3 - 1.0 K/uL Final    Eos # 06/26/2024 0.1  0.0 - 0.5 K/uL Final    Baso # 06/26/2024 0.08  0.00 - 0.20 K/uL Final    nRBC 06/26/2024 0  0 /100 WBC Final    Gran % 06/26/2024 54.7  38.0 - 73.0 % Final    Lymph % 06/26/2024 33.9  18.0 - 48.0 % Final    Mono % 06/26/2024 7.9  4.0 - 15.0 % Final    Eosinophil % 06/26/2024 1.9  0.0 - 8.0 % Final    Basophil % 06/26/2024 1.4  0.0 - 1.9 % Final    Differential Method 06/26/2024 Automated   Final    Sodium 06/26/2024 137  136 - 145 mmol/L Final    Potassium 06/26/2024 4.2  3.5 - 5.1 mmol/L Final    Chloride 06/26/2024 105  95 - 110 mmol/L Final    CO2 06/26/2024 21 (L)  23 - 29 mmol/L Final    Glucose 06/26/2024 84  70 - 110 mg/dL Final    BUN 06/26/2024 9  6 - 20 mg/dL Final    Creatinine 06/26/2024 1.2  0.5 - 1.4 mg/dL Final    Calcium 06/26/2024 9.1  8.7 - 10.5 mg/dL Final    Total Protein 06/26/2024 7.2  6.0 - 8.4 g/dL Final    Albumin 06/26/2024 3.9  3.5 - 5.2 g/dL Final    Total Bilirubin 06/26/2024 0.6  0.1 - 1.0 mg/dL Final    Comment: For infants and newborns, interpretation of results should be based  on gestational age, weight and in agreement with clinical  observations.    Premature Infant recommended reference ranges:  Up to 24 hours.............<8.0 mg/dL  Up to 48 hours............<12.0 mg/dL  3-5 days..................<15.0 mg/dL  6-29 days.................<15.0 mg/dL      Alkaline Phosphatase 06/26/2024 40 (L)  55 - 135 U/L Final    AST 06/26/2024 28  10 - 40 U/L Final    ALT 06/26/2024 50 (H)  10 - 44 U/L Final    eGFR  06/26/2024 >60.0  >60 mL/min/1.73 m^2 Final    Anion Gap 06/26/2024 11  8 - 16 mmol/L Final    Cholesterol 06/26/2024 286 (H)  120 - 199 mg/dL Final    Comment: The National Cholesterol Education Program (NCEP) has set the  following guidelines (reference ranges) for Cholesterol:  Optimal.....................<200 mg/dL  Borderline High.............200-239 mg/dL  High........................> or = 240 mg/dL      Triglycerides 06/26/2024 235 (H)  30 - 150 mg/dL Final    Comment: The National Cholesterol Education Program (NCEP) has set the  following guidelines (reference values) for triglycerides:  Normal......................<150 mg/dL  Borderline High.............150-199 mg/dL  High........................200-499 mg/dL      HDL 06/26/2024 39 (L)  40 - 75 mg/dL Final    Comment: The National Cholesterol Education Program (NCEP) has set the  following guidelines (reference values) for HDL Cholesterol:  Low...............<40 mg/dL  Optimal...........>60 mg/dL      LDL Cholesterol 06/26/2024 200.0 (H)  63.0 - 159.0 mg/dL Final    Comment: The National Cholesterol Education Program (NCEP) has set the  following guidelines (reference values) for LDL Cholesterol:  Optimal.......................<130 mg/dL  Borderline High...............130-159 mg/dL  High..........................160-189 mg/dL  Very High.....................>190 mg/dL      HDL/Cholesterol Ratio 06/26/2024 13.6 (L)  20.0 - 50.0 % Final    Total Cholesterol/HDL Ratio 06/26/2024 7.3 (H)  2.0 - 5.0 Final    Non-HDL Cholesterol 06/26/2024 247  mg/dL Final    Comment: Risk category and Non-HDL cholesterol goals:  Coronary heart disease (CHD)or equivalent (10-year risk of CHD >20%):  Non-HDL cholesterol goal     <130 mg/dL  Two or more CHD risk factors and 10-year risk of CHD <= 20%:  Non-HDL cholesterol goal     <160 mg/dL  0 to 1 CHD risk factor:  Non-HDL cholesterol goal     <190 mg/dL      TSH 06/26/2024 1.322  0.400 - 4.000 uIU/mL Final    Microalbumin,  Urine 06/26/2024 7.0  ug/mL Final    Creatinine, Urine 06/26/2024 275.0  23.0 - 375.0 mg/dL Final    Microalb/Creat Ratio 06/26/2024 2.5  0.0 - 30.0 ug/mg Final    Specimen UA 06/26/2024 Urine, Clean Catch   Final    Color, UA 06/26/2024 Yellow  Yellow, Straw, [place] Final    Appearance, UA 06/26/2024 Clear  Clear Final    pH, UA 06/26/2024 7.0  5.0 - 8.0 Final    Specific Gravity, UA 06/26/2024 1.030  1.005 - 1.030 Final    Protein, UA 06/26/2024 Trace (A)  Negative Final    Comment: Recommend a 24 hour urine protein or a urine   protein/creatinine ratio if globulin induced proteinuria is  clinically suspected.      Glucose, UA 06/26/2024 Negative  Negative Final    Ketones, UA 06/26/2024 Trace (A)  Negative Final    Bilirubin (UA) 06/26/2024 Negative  Negative Final    Occult Blood UA 06/26/2024 Negative  Negative Final    Nitrite, UA 06/26/2024 Negative  Negative Final    Leukocytes, UA 06/26/2024 Negative  Negative Final    Hemoglobin A1C 06/26/2024 4.8  4.0 - 5.6 % Final    Comment: ADA Screening Guidelines:  5.7-6.4%  Consistent with prediabetes  >or=6.5%  Consistent with diabetes    High levels of fetal hemoglobin interfere with the HbA1C  assay. Heterozygous hemoglobin variants (HbS, HgC, etc)do  not significantly interfere with this assay.   However, presence of multiple variants may affect accuracy.      Estimated Avg Glucose 06/26/2024 91  68 - 131 mg/dL Final     Assessment:       1. Essential hypertension    2. Mixed hyperlipidemia    3. Coronary artery disease involving native coronary artery of native heart with unstable angina pectoris    4. Fatty liver        Plan:       Essential hypertension  Stable, continue metoprolol succinate 25 mg, lasix 20 mg, and losartan 50 mg. Reduce the amount of salt in your diet; Lose weight; Avoid drinking too much alcohol; Exercise at least 30 minutes per day most days of the week.  Continue current medications and home BP monitoring.  -     Comprehensive Metabolic  Panel; Future; Expected date: 08/23/2024  -     Lipid Panel; Future; Expected date: 08/23/2024  -     TSH; Future; Expected date: 08/23/2024    Mixed hyperlipidemia  Continue Crestor 40 mg, compliance encouraged. Limit red meat, butter, fried foods, cheese, and other foods that have a lot of saturated fat. Consume more: lean meats, fish, fruits, vegetables, whole grains, beans, lentils, and nuts.  Weight loss, and 30-45 min of cardiovascular exercise daily.  -     CBC Auto Differential; Future; Expected date: 08/23/2024  -     Comprehensive Metabolic Panel; Future; Expected date: 08/23/2024  -     Lipid Panel; Future; Expected date: 08/23/2024    Coronary artery disease involving native coronary artery of native heart with unstable angina pectoris  Asymptomatic, continue current meds, managed by Cardiology.   -     CBC Auto Differential; Future; Expected date: 08/23/2024  -     Comprehensive Metabolic Panel; Future; Expected date: 08/23/2024  -     Lipid Panel; Future; Expected date: 08/23/2024  -     TSH; Future; Expected date: 08/23/2024    Fatty liver  Recommend low fat/low cholesterol diet, weight loss, and exercise.   -     Comprehensive Metabolic Panel; Future; Expected date: 08/23/2024  -     Lipid Panel; Future; Expected date: 08/23/2024    This note was created using [employer] voice recognition software that occasionally misinterprets phrases or words.     I spent a total of 15 minutes on the day of the visit.This includes face to face time and non-face to face time preparing to see the patient (eg, review of tests), obtaining and/or reviewing separately obtained history, documenting clinical information in the electronic or other health record, independently interpreting results and communicating results to the patient/family/caregiver, or care coordinator.    Follow up in about 4 months (around 12/23/2024).          8/23/2024 AGA Maradiaga, NAVINP

## 2024-09-03 DIAGNOSIS — R60.0 LOCALIZED EDEMA: Primary | ICD-10-CM

## 2024-09-03 RX ORDER — FUROSEMIDE 20 MG/1
20 TABLET ORAL
Qty: 30 TABLET | Refills: 11 | Status: SHIPPED | OUTPATIENT
Start: 2024-09-03

## 2024-09-03 NOTE — TELEPHONE ENCOUNTER
9/3/24 patient was last seen on 11/7/23 w/N SHIRA Acharya. Patient is requesting a refill for Lasix 20 MG  request will be sent to Pt  provider for approval .

## 2024-09-24 DIAGNOSIS — F51.01 PRIMARY INSOMNIA: ICD-10-CM

## 2024-09-25 RX ORDER — TRAZODONE HYDROCHLORIDE 150 MG/1
150 TABLET ORAL NIGHTLY
Qty: 90 TABLET | Refills: 1 | Status: SHIPPED | OUTPATIENT
Start: 2024-09-25

## 2024-10-07 DIAGNOSIS — I25.110 CORONARY ARTERY DISEASE INVOLVING NATIVE CORONARY ARTERY OF NATIVE HEART WITH UNSTABLE ANGINA PECTORIS: ICD-10-CM

## 2024-10-07 DIAGNOSIS — I10 ESSENTIAL HYPERTENSION: Primary | ICD-10-CM

## 2024-10-07 DIAGNOSIS — I10 ESSENTIAL HYPERTENSION: ICD-10-CM

## 2024-10-07 RX ORDER — ISOSORBIDE MONONITRATE 30 MG/1
30 TABLET, EXTENDED RELEASE ORAL DAILY
Qty: 30 TABLET | Refills: 0 | Status: SHIPPED | OUTPATIENT
Start: 2024-10-07 | End: 2024-10-09 | Stop reason: SDUPTHER

## 2024-10-07 RX ORDER — CLOPIDOGREL BISULFATE 75 MG/1
75 TABLET ORAL DAILY
Qty: 30 TABLET | Refills: 0 | Status: SHIPPED | OUTPATIENT
Start: 2024-10-07 | End: 2024-10-09 | Stop reason: SDUPTHER

## 2024-10-07 NOTE — TELEPHONE ENCOUNTER
10/7/24 patient is requesting a refill for managed Rx  will send in a 30 day supply to provider for approval patient will also need a f/ u slot for full refill script

## 2024-10-09 ENCOUNTER — OFFICE VISIT (OUTPATIENT)
Dept: CARDIOLOGY | Facility: CLINIC | Age: 39
End: 2024-10-09
Payer: COMMERCIAL

## 2024-10-09 VITALS
WEIGHT: 198 LBS | HEIGHT: 70 IN | BODY MASS INDEX: 28.35 KG/M2 | DIASTOLIC BLOOD PRESSURE: 61 MMHG | OXYGEN SATURATION: 97 % | HEART RATE: 81 BPM | SYSTOLIC BLOOD PRESSURE: 98 MMHG

## 2024-10-09 DIAGNOSIS — I49.8 VENTRICULAR TRIGEMINY: ICD-10-CM

## 2024-10-09 DIAGNOSIS — I25.110 CORONARY ARTERY DISEASE INVOLVING NATIVE CORONARY ARTERY OF NATIVE HEART WITH UNSTABLE ANGINA PECTORIS: Primary | ICD-10-CM

## 2024-10-09 DIAGNOSIS — D75.1 SECONDARY POLYCYTHEMIA: ICD-10-CM

## 2024-10-09 DIAGNOSIS — I10 ESSENTIAL HYPERTENSION: ICD-10-CM

## 2024-10-09 DIAGNOSIS — F17.219 CIGARETTE NICOTINE DEPENDENCE WITH NICOTINE-INDUCED DISORDER: ICD-10-CM

## 2024-10-09 DIAGNOSIS — R60.0 LOCALIZED EDEMA: ICD-10-CM

## 2024-10-09 DIAGNOSIS — K21.9 GASTROESOPHAGEAL REFLUX DISEASE, UNSPECIFIED WHETHER ESOPHAGITIS PRESENT: ICD-10-CM

## 2024-10-09 DIAGNOSIS — E78.2 MIXED HYPERLIPIDEMIA: ICD-10-CM

## 2024-10-09 PROCEDURE — 1159F MED LIST DOCD IN RCRD: CPT | Mod: CPTII,S$GLB,,

## 2024-10-09 PROCEDURE — 4010F ACE/ARB THERAPY RXD/TAKEN: CPT | Mod: CPTII,S$GLB,,

## 2024-10-09 PROCEDURE — 3008F BODY MASS INDEX DOCD: CPT | Mod: CPTII,S$GLB,,

## 2024-10-09 PROCEDURE — 3078F DIAST BP <80 MM HG: CPT | Mod: CPTII,S$GLB,,

## 2024-10-09 PROCEDURE — 99214 OFFICE O/P EST MOD 30 MIN: CPT | Mod: S$GLB,,,

## 2024-10-09 PROCEDURE — 99999 PR PBB SHADOW E&M-EST. PATIENT-LVL III: CPT | Mod: PBBFAC,,,

## 2024-10-09 PROCEDURE — 3066F NEPHROPATHY DOC TX: CPT | Mod: CPTII,S$GLB,,

## 2024-10-09 PROCEDURE — 3074F SYST BP LT 130 MM HG: CPT | Mod: CPTII,S$GLB,,

## 2024-10-09 PROCEDURE — 1160F RVW MEDS BY RX/DR IN RCRD: CPT | Mod: CPTII,S$GLB,,

## 2024-10-09 PROCEDURE — 3044F HG A1C LEVEL LT 7.0%: CPT | Mod: CPTII,S$GLB,,

## 2024-10-09 PROCEDURE — 3061F NEG MICROALBUMINURIA REV: CPT | Mod: CPTII,S$GLB,,

## 2024-10-09 RX ORDER — ASPIRIN 81 MG/1
81 TABLET ORAL DAILY
Start: 2024-10-09 | End: 2025-01-07

## 2024-10-09 RX ORDER — ISOSORBIDE MONONITRATE 30 MG/1
TABLET, EXTENDED RELEASE ORAL
Qty: 90 TABLET | OUTPATIENT
Start: 2024-10-09

## 2024-10-09 RX ORDER — PANTOPRAZOLE SODIUM 20 MG/1
20 TABLET, DELAYED RELEASE ORAL
Qty: 90 TABLET | Refills: 1 | Status: SHIPPED | OUTPATIENT
Start: 2024-10-09

## 2024-10-09 RX ORDER — CLOPIDOGREL BISULFATE 75 MG/1
75 TABLET ORAL DAILY
Qty: 30 TABLET | Refills: 0 | Status: SHIPPED | OUTPATIENT
Start: 2024-10-09 | End: 2024-11-08

## 2024-10-09 RX ORDER — LOSARTAN POTASSIUM 25 MG/1
25 TABLET ORAL DAILY
Qty: 90 TABLET | Refills: 3 | Status: SHIPPED | OUTPATIENT
Start: 2024-10-09 | End: 2025-10-09

## 2024-10-09 RX ORDER — METOPROLOL SUCCINATE 25 MG/1
50 TABLET, EXTENDED RELEASE ORAL DAILY
Qty: 270 TABLET | Refills: 3 | Status: SHIPPED | OUTPATIENT
Start: 2024-10-09

## 2024-10-09 RX ORDER — POTASSIUM CHLORIDE 600 MG/1
8 TABLET, FILM COATED, EXTENDED RELEASE ORAL DAILY
Qty: 30 TABLET | Refills: 11 | Status: SHIPPED | OUTPATIENT
Start: 2024-10-09

## 2024-10-09 RX ORDER — FUROSEMIDE 20 MG/1
20 TABLET ORAL DAILY
Qty: 30 TABLET | Refills: 11 | Status: SHIPPED | OUTPATIENT
Start: 2024-10-09

## 2024-10-09 RX ORDER — ROSUVASTATIN CALCIUM 40 MG/1
40 TABLET, COATED ORAL NIGHTLY
Qty: 90 TABLET | Refills: 3 | Status: SHIPPED | OUTPATIENT
Start: 2024-10-09 | End: 2025-10-04

## 2024-10-09 RX ORDER — ISOSORBIDE MONONITRATE 30 MG/1
30 TABLET, EXTENDED RELEASE ORAL DAILY
Qty: 30 TABLET | Refills: 0 | Status: SHIPPED | OUTPATIENT
Start: 2024-10-09 | End: 2024-11-08

## 2024-10-09 RX ORDER — CLOPIDOGREL BISULFATE 75 MG/1
TABLET ORAL
Qty: 90 TABLET | OUTPATIENT
Start: 2024-10-09

## 2024-10-09 NOTE — ASSESSMENT & PLAN NOTE
BP stable. Low normal today in office.  Reduce losartan to 25 mg daily.  Reduce Imdur to every other day.  If not HTN or CP, can stop Imdur.  Patient is eager to come off/reduce his medications.  Discussed with patient that he will need to stay on statin therapy and aspirin likely lifelong. Continue current medication regimen.  Monitor BP at home.  Keep log and upload to portal.  Continue low sodium heart healthy diet.

## 2024-10-09 NOTE — ASSESSMENT & PLAN NOTE
Denies anginal equivalent symptoms.  Continue low sodium heart healthy diet. Continue DAPT.  Continue statin therapy.  Strongly encouraged patient to not stop his statin.  Repeat lipid panel. Continue rosuvastatin 40 mg daily.  Goal LDL <70.

## 2024-10-09 NOTE — ASSESSMENT & PLAN NOTE
Strongly encouraged compliance with statin therapy.    Low sodium heart healthy diet.  Continue exercise and weight loss.

## 2024-10-09 NOTE — PROGRESS NOTES
Subjective:    Patient ID:  Irving Tanner is a 39 y.o. male patient here for evaluation Annual Exam (No issues stated by pt )      History of Present Illness:     For a checkup.  Denies chest pain, shortness of breath, lightheadedness, dizziness, jaw neck or arm pain, edema or bleeding.    Blood pressure is 98/61 today in office.  Asymptomatic.  Euvolemic.    He was compliant with taking his medications as prescribed at home.  Blood pressures are consistently less than 130/80.  He was had 30 lb weight loss since last year.  Continues to no longer smoke.    He did stop his rosuvastatin for awhile.  Lipid panel significantly worsened.  He recently restarted approximately 1 month ago.        Review of patient's allergies indicates:   Allergen Reactions    Motrin [ibuprofen]      swelling       Past Medical History:   Diagnosis Date    Cigarette nicotine dependence with nicotine-induced disorder 9/7/2022    Hyperlipidemia     Hypertension     Secondary polycythemia 11/6/2022     Past Surgical History:   Procedure Laterality Date    APPENDECTOMY      FOOT FRACTURE SURGERY      FRACTURE SURGERY      Incision and Drainage of Perirectal Abscess  06/22/2018        LEFT HEART CATHETERIZATION Left 10/09/2022    Procedure: Left heart cath;  Surgeon: Jose Rafael Hernandez MD;  Location: [facility] CATH/EP LAB;  Service: Cardiology;  Laterality: Left;     Social History     Tobacco Use    Smoking status: Former     Current packs/day: 1.00     Average packs/day: 1 pack/day for 15.0 years (15.0 ttl pk-yrs)     Types: Cigarettes    Smokeless tobacco: Never   Substance Use Topics    Alcohol use: No     Comment: occasionally    Drug use: No        Review of Systems:    As noted in HPI in addition      REVIEW OF SYSTEMS  CARDIOVASCULAR: No recent chest pain, palpitations, arm, neck, or jaw pain  RESPIRATORY: No recent fever, cough chills, SOB or congestion  : No blood in the urine  GI: No Nausea, vomiting, constipation, diarrhea,  blood, or reflux.  MUSCULOSKELETAL: No myalgias  NEURO: No lightheadedness or dizziness  EYES: No Double vision, blurry, vision or headache              Objective        Vitals:    10/09/24 1012   BP: 98/61   Pulse: 81       LIPIDS - LAST 2   Lab Results   Component Value Date    CHOL 286 (H) 06/26/2024    CHOL 172 08/24/2023    HDL 39 (L) 06/26/2024    HDL 56 08/24/2023    LDLCALC 200.0 (H) 06/26/2024    LDLCALC 90.6 08/24/2023    TRIG 235 (H) 06/26/2024    TRIG 127 08/24/2023    CHOLHDL 13.6 (L) 06/26/2024    CHOLHDL 32.6 08/24/2023       CBC - LAST 2  Lab Results   Component Value Date    WBC 7.02 08/09/2024    WBC 5.72 06/26/2024    RBC 6.00 08/09/2024    RBC 6.22 (H) 06/26/2024    HGB 18.3 (H) 08/09/2024    HGB 18.3 (H) 06/26/2024    HCT 53.9 08/09/2024    HCT 57.7 (H) 06/26/2024    MCV 90 08/09/2024    MCV 93 06/26/2024    MCH 30.5 08/09/2024    MCH 29.4 06/26/2024    MCHC 34.0 08/09/2024    MCHC 31.7 (L) 06/26/2024    RDW 14.5 08/09/2024    RDW 14.5 06/26/2024     08/09/2024     06/26/2024    MPV 10.1 08/09/2024    MPV 9.9 06/26/2024    GRAN 4.4 08/09/2024    GRAN 63.0 08/09/2024    LYMPH 1.9 08/09/2024    LYMPH 27.4 08/09/2024    MONO 0.5 08/09/2024    MONO 7.3 08/09/2024    BASO 0.06 08/09/2024    BASO 0.08 06/26/2024    NRBC 0 08/09/2024    NRBC 0 06/26/2024       CHEMISTRY & LIVER FUNCTION - LAST 2  Lab Results   Component Value Date     06/26/2024     08/24/2023     08/24/2023    K 4.2 06/26/2024    K 3.8 08/24/2023    K 3.8 08/24/2023     06/26/2024     08/24/2023     08/24/2023    CO2 21 (L) 06/26/2024    CO2 23 08/24/2023    CO2 23 08/24/2023    ANIONGAP 11 06/26/2024    ANIONGAP 7 (L) 08/24/2023    ANIONGAP 7 (L) 08/24/2023    BUN 9 06/26/2024    BUN 17 08/24/2023    BUN 17 08/24/2023    CREATININE 1.2 06/26/2024    CREATININE 1.0 08/24/2023    CREATININE 1.0 08/24/2023    GLU 84 06/26/2024    GLU 97 08/24/2023    GLU 97 08/24/2023    CALCIUM 9.1  06/26/2024    CALCIUM 8.4 (L) 08/24/2023    CALCIUM 8.4 (L) 08/24/2023    MG 2.0 10/10/2022    MG 2.2 10/09/2022    ALBUMIN 3.9 06/26/2024    ALBUMIN 3.8 08/24/2023    PROT 7.2 06/26/2024    PROT 7.4 08/24/2023    ALKPHOS 40 (L) 06/26/2024    ALKPHOS 48 (L) 08/24/2023    ALT 50 (H) 06/26/2024    ALT 67 (H) 08/24/2023    AST 28 06/26/2024    AST 28 08/24/2023    BILITOT 0.6 06/26/2024    BILITOT 0.7 08/24/2023        CARDIAC PROFILE - LAST 2  Lab Results   Component Value Date    BNP 13 12/19/2022    BNP 15 09/22/2022     (H) 06/12/2020     (H) 06/12/2020    CPKMB 5.4 06/11/2020    TROPONINI 0.095 (H) 10/10/2022    TROPONINI <0.030 10/09/2022    TROPONINIHS 5.4 12/22/2022        COAGULATION - LAST 2  Lab Results   Component Value Date    LABPT 13.2 10/08/2022    LABPT 12.8 10/07/2022    INR 1.1 10/08/2022    INR 1.0 10/07/2022    APTT 58.5 (H) 10/09/2022    APTT 58.3 (H) 10/09/2022       ENDOCRINE & PSA - LAST 2  Lab Results   Component Value Date    HGBA1C 4.8 06/26/2024    TSH 1.322 06/26/2024    TSH 0.990 08/24/2023        ECHOCARDIOGRAM RESULTS  Results for orders placed during the hospital encounter of 10/07/22    Echo    Interpretation Summary  · The left ventricle is normal in size with mild concentric hypertrophy and normal systolic function.  · The estimated PA systolic pressure is 21 mmHg.  · Normal left ventricular diastolic function.  · Normal right ventricular size with normal right ventricular systolic function.  · Normal central venous pressure (3 mmHg).  · Mild tricuspid regurgitation.  · The estimated ejection fraction is 55%.  · Mild left atrial enlargement.      CURRENT/PREVIOUS VISIT EKG  Results for orders placed or performed in visit on 11/07/23   IN OFFICE EKG 12-LEAD (to [place])    Collection Time: 11/07/23  1:03 PM    Narrative    Test Reason : I25.110,    Vent. Rate : 084 BPM     Atrial Rate : 084 BPM     P-R Int : 124 ms          QRS Dur : 096 ms      QT Int : 374 ms       P-R-T  Axes : 044 029 015 degrees     QTc Int : 441 ms    Normal sinus rhythm  Cannot rule out Anterior infarct ,age undetermined  Abnormal ECG  When compared with ECG of 22-DEC-2022 18:29,  Premature ventricular complexes are no longer Present  Confirmed by Ruperto Alvarez MD ([id number]) on 11/16/2023 12:58:22 PM    Referred By:             Confirmed By:Ruperto Alvarez MD     No valid procedures specified.   Results for orders placed during the hospital encounter of 10/07/22    Nuclear Stress Test    Interpretation Summary    The EKG portion of this study is abnormal but not diagnostic.    The patient reported no chest pain during the stress test.    During stress, frequent PVCs are noted.    The nuclear portion of this study will be reported separately.    No valid procedures specified.    PHYSICAL EXAM  CONSTITUTIONAL: Well built, well nourished in no apparent distress  NECK: no carotid bruit, no JVD  LUNGS: CTA  CHEST WALL: no tenderness  HEART: regular rate and rhythm, S1, S2 normal, no murmur, click, rub or gallop   ABDOMEN: soft, non-tender; bowel sounds normal; no masses,  no organomegaly  EXTREMITIES: Extremities normal, no edema, no calf tenderness noted  NEURO: AAO X 3    I HAVE REVIEWED :    The vital signs, nurses notes, and all the pertinent radiology and labs.        Current Outpatient Medications   Medication Instructions    aspirin (ECOTRIN) 81 mg, Oral, Daily    clopidogreL (PLAVIX) 75 mg, Oral, Daily    furosemide (LASIX) 20 mg, Oral, Daily    isosorbide mononitrate (IMDUR) 30 mg, Oral, Daily    losartan (COZAAR) 25 mg, Oral, Daily    metoprolol succinate (TOPROL-XL) 50 mg, Oral, Daily    pantoprazole (PROTONIX) 20 mg, Oral, Before breakfast    potassium chloride (KLOR-CON) 8 MEQ TbSR 8 mEq, Oral, Daily    rosuvastatin (CRESTOR) 40 mg, Oral, Nightly    traZODone (DESYREL) 150 mg, Oral, Nightly          Assessment & Plan     Coronary artery disease involving native coronary artery of native heart with unstable  angina pectoris s/p PCI  Denies anginal equivalent symptoms.  Continue low sodium heart healthy diet. Continue DAPT.  Continue statin therapy.  Strongly encouraged patient to not stop his statin.  Repeat lipid panel. Continue rosuvastatin 40 mg daily.  Goal LDL <70.       Essential hypertension  BP stable. Low normal today in office.  Reduce losartan to 25 mg daily.  Reduce Imdur to every other day.  If not HTN or CP, can stop Imdur.  Patient is eager to come off/reduce his medications.  Discussed with patient that he will need to stay on statin therapy and aspirin likely lifelong. Continue current medication regimen.  Monitor BP at home.  Keep log and upload to portal.  Continue low sodium heart healthy diet.        Mixed hyperlipidemia  Strongly encouraged compliance with statin therapy.    Low sodium heart healthy diet.  Continue exercise and weight loss.    Ventricular trigeminy  Heart rate and rhythm are regular in the office today with no irregular beats noted. He is had no palpitations, dizziness, syncope or chest pain     Secondary polycythemia  Managed by PCP.  H&H stable.    Cigarette nicotine dependence with nicotine-induced disorder  Patient has not smoked in almost 2 years. Continue cessation          Follow up in about 6 months (around 4/9/2025).

## 2024-10-10 LAB
OHS QRS DURATION: 84 MS
OHS QTC CALCULATION: 405 MS

## 2024-11-12 ENCOUNTER — TELEPHONE (OUTPATIENT)
Dept: PHARMACY | Facility: CLINIC | Age: 39
End: 2024-11-12
Payer: COMMERCIAL

## 2024-11-12 NOTE — TELEPHONE ENCOUNTER
[facility]/Population Health Chart Review & Patient Outreach Details For Medication Adherence Project    Reason for Outreach Encounter: 3rd Party payor non-compliance report ([employer], [employer], [employer], etc)  2.  Patient Outreach Method: Reviewed Patient Chart  3.   Medication in question: rosuvastatin   LAST FILLED: 9/24/24 for 90 day supply  Hyperlipidemia Medications               rosuvastatin (CRESTOR) 40 MG Tab Take 1 tablet (40 mg total) by mouth every evening.               4.  Reviewed and or Updates Made To: Patient Chart  5. Outreach Outcomes and/or actions taken: Patient filled medication and is on track to be adherent

## 2024-12-08 DIAGNOSIS — I10 ESSENTIAL HYPERTENSION: ICD-10-CM

## 2024-12-08 DIAGNOSIS — I25.110 CORONARY ARTERY DISEASE INVOLVING NATIVE CORONARY ARTERY OF NATIVE HEART WITH UNSTABLE ANGINA PECTORIS: ICD-10-CM

## 2024-12-16 RX ORDER — ISOSORBIDE MONONITRATE 30 MG/1
30 TABLET, EXTENDED RELEASE ORAL DAILY
Qty: 30 TABLET | Refills: 5 | Status: SHIPPED | OUTPATIENT
Start: 2024-12-16 | End: 2025-06-14

## 2024-12-16 RX ORDER — CLOPIDOGREL BISULFATE 75 MG/1
75 TABLET ORAL DAILY
Qty: 30 TABLET | Refills: 5 | Status: SHIPPED | OUTPATIENT
Start: 2024-12-16 | End: 2025-06-14

## 2024-12-23 ENCOUNTER — OFFICE VISIT (OUTPATIENT)
Dept: FAMILY MEDICINE | Facility: CLINIC | Age: 39
End: 2024-12-23
Payer: COMMERCIAL

## 2024-12-23 VITALS
SYSTOLIC BLOOD PRESSURE: 118 MMHG | OXYGEN SATURATION: 98 % | BODY MASS INDEX: 27.22 KG/M2 | WEIGHT: 190.13 LBS | DIASTOLIC BLOOD PRESSURE: 82 MMHG | HEART RATE: 78 BPM | HEIGHT: 70 IN | TEMPERATURE: 98 F

## 2024-12-23 DIAGNOSIS — I10 ESSENTIAL HYPERTENSION: Primary | ICD-10-CM

## 2024-12-23 DIAGNOSIS — F51.01 PRIMARY INSOMNIA: ICD-10-CM

## 2024-12-23 DIAGNOSIS — E78.2 MIXED HYPERLIPIDEMIA: ICD-10-CM

## 2024-12-23 DIAGNOSIS — I25.110 CORONARY ARTERY DISEASE INVOLVING NATIVE CORONARY ARTERY OF NATIVE HEART WITH UNSTABLE ANGINA PECTORIS: ICD-10-CM

## 2024-12-23 PROCEDURE — 3066F NEPHROPATHY DOC TX: CPT | Mod: CPTII,S$GLB,, | Performed by: NURSE PRACTITIONER

## 2024-12-23 PROCEDURE — 3061F NEG MICROALBUMINURIA REV: CPT | Mod: CPTII,S$GLB,, | Performed by: NURSE PRACTITIONER

## 2024-12-23 PROCEDURE — 1159F MED LIST DOCD IN RCRD: CPT | Mod: CPTII,S$GLB,, | Performed by: NURSE PRACTITIONER

## 2024-12-23 PROCEDURE — 3044F HG A1C LEVEL LT 7.0%: CPT | Mod: CPTII,S$GLB,, | Performed by: NURSE PRACTITIONER

## 2024-12-23 PROCEDURE — 4010F ACE/ARB THERAPY RXD/TAKEN: CPT | Mod: CPTII,S$GLB,, | Performed by: NURSE PRACTITIONER

## 2024-12-23 PROCEDURE — 3008F BODY MASS INDEX DOCD: CPT | Mod: CPTII,S$GLB,, | Performed by: NURSE PRACTITIONER

## 2024-12-23 PROCEDURE — 99214 OFFICE O/P EST MOD 30 MIN: CPT | Mod: S$GLB,,, | Performed by: NURSE PRACTITIONER

## 2024-12-23 PROCEDURE — G2211 COMPLEX E/M VISIT ADD ON: HCPCS | Mod: 95,S$GLB,, | Performed by: NURSE PRACTITIONER

## 2024-12-23 PROCEDURE — 99999 PR PBB SHADOW E&M-EST. PATIENT-LVL IV: CPT | Mod: PBBFAC,,, | Performed by: NURSE PRACTITIONER

## 2024-12-23 PROCEDURE — 3079F DIAST BP 80-89 MM HG: CPT | Mod: CPTII,S$GLB,, | Performed by: NURSE PRACTITIONER

## 2024-12-23 PROCEDURE — 1160F RVW MEDS BY RX/DR IN RCRD: CPT | Mod: CPTII,S$GLB,, | Performed by: NURSE PRACTITIONER

## 2024-12-23 PROCEDURE — 3074F SYST BP LT 130 MM HG: CPT | Mod: CPTII,S$GLB,, | Performed by: NURSE PRACTITIONER

## 2024-12-23 NOTE — PROGRESS NOTES
SUBJECTIVE:      Patient ID: Irving Tanner is a 39 y.o. male.    Chief Complaint: Follow-up (4 mon f/u)    History of Present Illness    CHIEF COMPLAINT:  Mr. Tanner presents for follow-up to check blood pressure and cholesterol levels.    HPI:  Mr. Tanner is following up for blood pressure and cholesterol management. His blood pressure was measured at 118/82 during the visit, which was noted as favorable. He reports adherence to his current medication regimen and confirms resuming Crestor as prescribed. He had a cardiology appointment last month, where his progress was deemed satisfactory. Mr. Tanner clarifies that he was taking the statin at the time of the cardiology appointment, contrary to potential misunderstandings based on outdated labs. He reports consistently low blood pressure recently. The cardiology office recently reduced his losartan dose from 50 mg to 25 mg for blood pressure management. Mr. Tanner is also taking metoprolol succinate, Plavix, and aspirin as part of his cardiac medication regimen. He confirms continued use of Trazodone at night and reports adequate sleep with it.    Mr. Tanner denies chest pain, shortness of breath, or leg swelling. He also denies current smoking.    MEDICATIONS:  Mr. Tanner is on Losartan 25 mg and Metoprolol succinate for blood pressure management. He is also taking Crestor (rosuvastatin) for cholesterol control, Plavix (clopidogrel), and Aspirin. He is on Trazodone for sleep.    MEDICAL HISTORY:  Mr. Tanner has a history of hypertension, hypercholesterolemia, and a cardiovascular condition.    TEST RESULTS:  Mr. Tanner's cholesterol test results are pending. A previous LDL result was mentioned, with a goal of less than 70.    SOCIAL HISTORY:  Mr. Tanner is a former smoker. He works in shifts of 2 weeks straight.        Review of Systems   Constitutional:  Negative for activity change, appetite change, chills, diaphoresis, fatigue, fever and  unexpected weight change.   HENT:  Negative for congestion, ear pain, sinus pressure, sore throat, trouble swallowing and voice change.    Eyes:  Negative for pain, discharge and visual disturbance.   Respiratory:  Negative for cough, chest tightness, shortness of breath and wheezing.    Cardiovascular:  Negative for chest pain and palpitations.   Gastrointestinal:  Negative for abdominal pain, constipation, diarrhea, nausea and vomiting.   Genitourinary:  Negative for difficulty urinating, flank pain, frequency and urgency.   Musculoskeletal:  Negative for back pain and joint swelling.   Skin:  Negative for color change and rash.   Neurological:  Negative for dizziness, seizures, syncope, weakness, numbness and headaches.   Hematological:  Negative for adenopathy.   Psychiatric/Behavioral:  Negative for dysphoric mood and sleep disturbance. The patient is not nervous/anxious.        Family History   Problem Relation Name Age of Onset    Hypertension Mother Kathi vazquez     Diabetes Mother Kathi vazquez     Thyroid disease Mother Kathi vazquez     No Known Problems Father        Social History     Socioeconomic History    Marital status:     Number of children: 1   Tobacco Use    Smoking status: Former     Current packs/day: 1.00     Average packs/day: 1 pack/day for 15.0 years (15.0 ttl pk-yrs)     Types: Cigarettes    Smokeless tobacco: Never   Substance and Sexual Activity    Alcohol use: No     Comment: occasionally    Drug use: No    Sexual activity: Yes     Partners: Female     Birth control/protection: None     Social Drivers of Health     Financial Resource Strain: Patient Declined (4/9/2024)    Overall Financial Resource Strain (CARDIA)     Difficulty of Paying Living Expenses: Patient declined   Food Insecurity: No Food Insecurity (4/9/2024)    Hunger Vital Sign     Worried About Running Out of Food in the Last Year: Never true     Ran Out of Food in the Last Year: Never true   Transportation Needs:  No Transportation Needs (4/9/2024)    PRAPARE - Transportation     Lack of Transportation (Medical): No     Lack of Transportation (Non-Medical): No   Physical Activity: Insufficiently Active (4/9/2024)    Exercise Vital Sign     Days of Exercise per Week: 3 days     Minutes of Exercise per Session: 30 min   Stress: No Stress Concern Present (4/9/2024)    [employer] [place] [employer] - Occupational Stress Questionnaire     Feeling of Stress : Only a little   Housing Stability: Low Risk  (4/9/2024)    Housing Stability Vital Sign     Unable to Pay for Housing in the Last Year: No     Number of Places Lived in the Last Year: 1     Unstable Housing in the Last Year: No     Current Outpatient Medications   Medication Sig Dispense Refill    aspirin (ECOTRIN) 81 MG EC tablet Take 1 tablet (81 mg total) by mouth once daily.      clopidogreL (PLAVIX) 75 mg tablet Take 1 tablet (75 mg total) by mouth once daily. 30 tablet 5    furosemide (LASIX) 20 MG tablet Take 1 tablet (20 mg total) by mouth once daily. 30 tablet 11    isosorbide mononitrate (IMDUR) 30 MG 24 hr tablet Take 1 tablet (30 mg total) by mouth once daily. for 180 doses 30 tablet 5    losartan (COZAAR) 25 MG tablet Take 1 tablet (25 mg total) by mouth once daily. 90 tablet 3    metoprolol succinate (TOPROL-XL) 25 MG 24 hr tablet Take 2 tablets (50 mg total) by mouth once daily. 270 tablet 3    pantoprazole (PROTONIX) 20 MG tablet Take 1 tablet (20 mg total) by mouth before breakfast. 90 tablet 1    potassium chloride (KLOR-CON) 8 MEQ TbSR Take 1 tablet (8 mEq total) by mouth once daily. 30 tablet 11    rosuvastatin (CRESTOR) 40 MG Tab Take 1 tablet (40 mg total) by mouth every evening. 90 tablet 3    traZODone (DESYREL) 150 MG tablet Take 1 tablet (150 mg total) by mouth nightly. 90 tablet 1     No current facility-administered medications for this visit.     Review of patient's allergies indicates:   Allergen Reactions    Motrin [ibuprofen]       "swelling      Past Medical History:   Diagnosis Date    Cigarette nicotine dependence with nicotine-induced disorder 9/7/2022    Hyperlipidemia     Hypertension     Secondary polycythemia 11/6/2022     Past Surgical History:   Procedure Laterality Date    APPENDECTOMY      FOOT FRACTURE SURGERY      FRACTURE SURGERY      Incision and Drainage of Perirectal Abscess  06/22/2018        LEFT HEART CATHETERIZATION Left 10/09/2022    Procedure: Left heart cath;  Surgeon: Jose Rafael Hernandez MD;  Location: [facility] CATH[facility]EP LAB;  Service: Cardiology;  Laterality: Left;          OBJECTIVE:      Vitals:    12/23/24 1012   BP: 118/82   BP Location: Left arm   Patient Position: Sitting   Pulse: 78   Temp: 98.3 °F (36.8 °C)   TempSrc: Oral   SpO2: 98%   Weight: 86.2 kg (190 lb 1.6 oz)   Height: 5' 10" (1.778 m)     Physical Exam  Vitals and nursing note reviewed.   Constitutional:       General: He is awake. He is not in acute distress.     Appearance: He is well-developed, well-groomed and overweight. He is not ill-appearing, toxic-appearing or diaphoretic.   HENT:      Head: Normocephalic and atraumatic.      Nose: Nose normal.   Eyes:      General: Lids are normal. Gaze aligned appropriately.      Conjunctiva/sclera: Conjunctivae normal.      Right eye: Right conjunctiva is not injected.      Left eye: Left conjunctiva is not injected.      Pupils: Pupils are equal, round, and reactive to light.   Cardiovascular:      Rate and Rhythm: Normal rate and regular rhythm.      Pulses: Normal pulses.      Heart sounds: Normal heart sounds, S1 normal and S2 normal. No murmur heard.     No friction rub. No gallop.   Pulmonary:      Effort: Pulmonary effort is normal. No respiratory distress.      Breath sounds: Normal breath sounds. No stridor. No decreased breath sounds, wheezing, rhonchi or rales.   Chest:      Chest wall: No tenderness.   Musculoskeletal:      Cervical back: Neck supple.      Right lower leg: No edema.      " Left lower leg: No edema.   Lymphadenopathy:      Cervical: No cervical adenopathy.   Skin:     General: Skin is warm and dry.      Capillary Refill: Capillary refill takes less than 2 seconds.      Findings: No erythema or rash.   Neurological:      Mental Status: He is alert and oriented to person, place, and time. Mental status is at baseline.   Psychiatric:         Attention and Perception: Attention normal.         Mood and Affect: Mood normal.         Speech: Speech normal.         Behavior: Behavior normal. Behavior is cooperative.         Thought Content: Thought content normal.         Judgment: Judgment normal.       Office Visit on 10/09/2024   Component Date Value Ref Range Status    QRS Duration 10/09/2024 84  ms Final    [facility] QTC Calculation 10/09/2024 405  ms Final   Lab Visit on 08/09/2024   Component Date Value Ref Range Status    WBC 08/09/2024 7.02  3.90 - 12.70 K/uL Final    RBC 08/09/2024 6.00  4.60 - 6.20 M/uL Final    Hemoglobin 08/09/2024 18.3 (H)  14.0 - 18.0 g/dL Final    Hematocrit 08/09/2024 53.9  40.0 - 54.0 % Final    MCV 08/09/2024 90  82 - 98 fL Final    MCH 08/09/2024 30.5  27.0 - 31.0 pg Final    MCHC 08/09/2024 34.0  32.0 - 36.0 g/dL Final    RDW 08/09/2024 14.5  11.5 - 14.5 % Final    Platelets 08/09/2024 277  150 - 450 K/uL Final    MPV 08/09/2024 10.1  9.2 - 12.9 fL Final    Immature Granulocytes 08/09/2024 0.3  0.0 - 0.5 % Final    Gran # (ANC) 08/09/2024 4.4  1.8 - 7.7 K/uL Final    Immature Grans (Abs) 08/09/2024 0.02  0.00 - 0.04 K/uL Final    Comment: Mild elevation in immature granulocytes is non specific and   can be seen in a variety of conditions including stress response,   acute inflammation, trauma and pregnancy. Correlation with other   laboratory and clinical findings is essential.      Lymph # 08/09/2024 1.9  1.0 - 4.8 K/uL Final    Mono # 08/09/2024 0.5  0.3 - 1.0 K/uL Final    Eos # 08/09/2024 0.1  0.0 - 0.5 K/uL Final    Baso # 08/09/2024 0.06  0.00 - 0.20 K/uL  Final    nRBC 08/09/2024 0  0 /100 WBC Final    Gran % 08/09/2024 63.0  38.0 - 73.0 % Final    Lymph % 08/09/2024 27.4  18.0 - 48.0 % Final    Mono % 08/09/2024 7.3  4.0 - 15.0 % Final    Eosinophil % 08/09/2024 1.1  0.0 - 8.0 % Final    Basophil % 08/09/2024 0.9  0.0 - 1.9 % Final    Differential Method 08/09/2024 Automated   Final   Lab Visit on 06/26/2024   Component Date Value Ref Range Status    WBC 06/26/2024 5.72  3.90 - 12.70 K/uL Final    RBC 06/26/2024 6.22 (H)  4.60 - 6.20 M/uL Final    Hemoglobin 06/26/2024 18.3 (H)  14.0 - 18.0 g/dL Final    Hematocrit 06/26/2024 57.7 (H)  40.0 - 54.0 % Final    MCV 06/26/2024 93  82 - 98 fL Final    MCH 06/26/2024 29.4  27.0 - 31.0 pg Final    MCHC 06/26/2024 31.7 (L)  32.0 - 36.0 g/dL Final    RDW 06/26/2024 14.5  11.5 - 14.5 % Final    Platelets 06/26/2024 330  150 - 450 K/uL Final    MPV 06/26/2024 9.9  9.2 - 12.9 fL Final    Immature Granulocytes 06/26/2024 0.2  0.0 - 0.5 % Final    Gran # (ANC) 06/26/2024 3.1  1.8 - 7.7 K/uL Final    Immature Grans (Abs) 06/26/2024 0.01  0.00 - 0.04 K/uL Final    Comment: Mild elevation in immature granulocytes is non specific and   can be seen in a variety of conditions including stress response,   acute inflammation, trauma and pregnancy. Correlation with other   laboratory and clinical findings is essential.      Lymph # 06/26/2024 1.9  1.0 - 4.8 K/uL Final    Mono # 06/26/2024 0.5  0.3 - 1.0 K/uL Final    Eos # 06/26/2024 0.1  0.0 - 0.5 K/uL Final    Baso # 06/26/2024 0.08  0.00 - 0.20 K/uL Final    nRBC 06/26/2024 0  0 /100 WBC Final    Gran % 06/26/2024 54.7  38.0 - 73.0 % Final    Lymph % 06/26/2024 33.9  18.0 - 48.0 % Final    Mono % 06/26/2024 7.9  4.0 - 15.0 % Final    Eosinophil % 06/26/2024 1.9  0.0 - 8.0 % Final    Basophil % 06/26/2024 1.4  0.0 - 1.9 % Final    Differential Method 06/26/2024 Automated   Final    Sodium 06/26/2024 137  136 - 145 mmol/L Final    Potassium 06/26/2024 4.2  3.5 - 5.1 mmol/L Final     Chloride 06/26/2024 105  95 - 110 mmol/L Final    CO2 06/26/2024 21 (L)  23 - 29 mmol/L Final    Glucose 06/26/2024 84  70 - 110 mg/dL Final    BUN 06/26/2024 9  6 - 20 mg/dL Final    Creatinine 06/26/2024 1.2  0.5 - 1.4 mg/dL Final    Calcium 06/26/2024 9.1  8.7 - 10.5 mg/dL Final    Total Protein 06/26/2024 7.2  6.0 - 8.4 g/dL Final    Albumin 06/26/2024 3.9  3.5 - 5.2 g/dL Final    Total Bilirubin 06/26/2024 0.6  0.1 - 1.0 mg/dL Final    Comment: For infants and newborns, interpretation of results should be based  on gestational age, weight and in agreement with clinical  observations.    Premature Infant recommended reference ranges:  Up to 24 hours.............<8.0 mg/dL  Up to 48 hours............<12.0 mg/dL  3-5 days..................<15.0 mg/dL  6-29 days.................<15.0 mg/dL      Alkaline Phosphatase 06/26/2024 40 (L)  55 - 135 U/L Final    AST 06/26/2024 28  10 - 40 U/L Final    ALT 06/26/2024 50 (H)  10 - 44 U/L Final    eGFR 06/26/2024 >60.0  >60 mL/min/1.73 m^2 Final    Anion Gap 06/26/2024 11  8 - 16 mmol/L Final    Cholesterol 06/26/2024 286 (H)  120 - 199 mg/dL Final    Comment: The National Cholesterol Education Program (NCEP) has set the  following guidelines (reference ranges) for Cholesterol:  Optimal.....................<200 mg/dL  Borderline High.............200-239 mg/dL  High........................> or = 240 mg/dL      Triglycerides 06/26/2024 235 (H)  30 - 150 mg/dL Final    Comment: The National Cholesterol Education Program (NCEP) has set the  following guidelines (reference values) for triglycerides:  Normal......................<150 mg/dL  Borderline High.............150-199 mg/dL  High........................200-499 mg/dL      HDL 06/26/2024 39 (L)  40 - 75 mg/dL Final    Comment: The National Cholesterol Education Program (NCEP) has set the  following guidelines (reference values) for HDL Cholesterol:  Low...............<40 mg/dL  Optimal...........>60 mg/dL      LDL  Cholesterol 06/26/2024 200.0 (H)  63.0 - 159.0 mg/dL Final    Comment: The National Cholesterol Education Program (NCEP) has set the  following guidelines (reference values) for LDL Cholesterol:  Optimal.......................<130 mg/dL  Borderline High...............130-159 mg/dL  High..........................160-189 mg/dL  Very High.....................>190 mg/dL      HDL/Cholesterol Ratio 06/26/2024 13.6 (L)  20.0 - 50.0 % Final    Total Cholesterol/HDL Ratio 06/26/2024 7.3 (H)  2.0 - 5.0 Final    Non-HDL Cholesterol 06/26/2024 247  mg/dL Final    Comment: Risk category and Non-HDL cholesterol goals:  Coronary heart disease (CHD)or equivalent (10-year risk of CHD >20%):  Non-HDL cholesterol goal     <130 mg/dL  Two or more CHD risk factors and 10-year risk of CHD <= 20%:  Non-HDL cholesterol goal     <160 mg/dL  0 to 1 CHD risk factor:  Non-HDL cholesterol goal     <190 mg/dL      TSH 06/26/2024 1.322  0.400 - 4.000 uIU/mL Final    Microalbumin, Urine 06/26/2024 7.0  ug/mL Final    Creatinine, Urine 06/26/2024 275.0  23.0 - 375.0 mg/dL Final    Microalb/Creat Ratio 06/26/2024 2.5  0.0 - 30.0 ug/mg Final    Specimen UA 06/26/2024 Urine, Clean Catch   Final    Color, UA 06/26/2024 Yellow  Yellow, Straw, [place] Final    Appearance, UA 06/26/2024 Clear  Clear Final    pH, UA 06/26/2024 7.0  5.0 - 8.0 Final    Specific Gravity, UA 06/26/2024 1.030  1.005 - 1.030 Final    Protein, UA 06/26/2024 Trace (A)  Negative Final    Comment: Recommend a 24 hour urine protein or a urine   protein/creatinine ratio if globulin induced proteinuria is  clinically suspected.      Glucose, UA 06/26/2024 Negative  Negative Final    Ketones, UA 06/26/2024 Trace (A)  Negative Final    Bilirubin (UA) 06/26/2024 Negative  Negative Final    Occult Blood UA 06/26/2024 Negative  Negative Final    Nitrite, UA 06/26/2024 Negative  Negative Final    Leukocytes, UA 06/26/2024 Negative  Negative Final    Hemoglobin A1C 06/26/2024 4.8  4.0 - 5.6 %  Final    Comment: ADA Screening Guidelines:  5.7-6.4%  Consistent with prediabetes  >or=6.5%  Consistent with diabetes    High levels of fetal hemoglobin interfere with the HbA1C  assay. Heterozygous hemoglobin variants (HbS, HgC, etc)do  not significantly interfere with this assay.   However, presence of multiple variants may affect accuracy.      Estimated Avg Glucose 06/26/2024 91  68 - 131 mg/dL Final          Assessment:       1. Essential hypertension    2. Mixed hyperlipidemia    3. Coronary artery disease involving native coronary artery of native heart with unstable angina pectoris    4. Primary insomnia        Plan:       Assessment & Plan    IMPRESSION:  - Assessed blood pressure control, currently well-managed at 118/82  - Reviewed recent cardiology visit findings, including decrease in losartan dosage  - Evaluated cholesterol management, pending new lab results  - Considered future management of Imdur based on symptom status  - Maintained current antiplatelet therapy with Plavix and aspirin  - Assessed sleep quality with current Trazodone regimen    HYPERTENSION:  - Continued losartan 25 mg.  - Continued metoprolol succinate 50 mg.   - Continued Lasix 20 mg.   - Reduce the amount of salt in your diet; Lose weight; Avoid drinking too much alcohol; Exercise at least 30 minutes per day most days of the week.      HYPERLIPIDEMIA:  - Continued Crestor (rosuvastatin) 25 mg for cholesterol management.  - Ordered lipid panel and other pending labs.  - Limit red meat, butter, fried foods, cheese, and other foods that have a lot of saturated fat.   - Consume more: lean meats, fish, fruits, vegetables, whole grains, beans, lentils, and nuts.      CORONARY ARTERY DISEASE:  - Continued Plavix and aspirin for antiplatelet therapy.  - Remains symptomatic.     TOBACCO USE AND NICOTINE DEPENDENCE:  - Mr. Tanner to continue smoking cessation efforts.    SLEEP MANAGEMENT:  - Continued Trazodone 150 mg for  sleep.    PREVENTIVE CARE:  - Administered flu vaccine in office.    FOLLOW-UP AND LAB RESULTS:  - Follow up in 6 months.  - Complete pending labs at earliest convenience (options provided: office tomorrow if open, hospital, or [facility] imaging center).  - Results of repeat cholesterol and labs will be messaged via patient portal.        Essential hypertension    Mixed hyperlipidemia    Coronary artery disease involving native coronary artery of native heart with unstable angina pectoris    Primary insomnia    I spent a total of 15 minutes on the day of the visit.This includes face to face time and non-face to face time preparing to see the patient (eg, review of tests), obtaining and/or reviewing separately obtained history, documenting clinical information in the electronic or other health record, independently interpreting results and communicating results to the patient/family/caregiver, or care coordinator.    Follow up in about 6 months (around 6/23/2025) for HTN, HLD, Insomnia.          12/23/2024 AGA aMradiaga, FAIZAN    This note was generated with the assistance of ambient listening technology. Verbal consent was obtained by the patient and accompanying visitor(s) for the recording of patient appointment to facilitate this note. I attest to having reviewed and edited the generated note for accuracy, though some syntax or spelling errors may persist. Please contact the author of this note for any clarification.

## 2024-12-30 DIAGNOSIS — E78.2 MIXED HYPERLIPIDEMIA: ICD-10-CM

## 2024-12-30 RX ORDER — ROSUVASTATIN CALCIUM 40 MG/1
40 TABLET, COATED ORAL NIGHTLY
Qty: 90 TABLET | Refills: 3 | Status: SHIPPED | OUTPATIENT
Start: 2024-12-30

## 2025-03-13 DIAGNOSIS — F51.01 PRIMARY INSOMNIA: ICD-10-CM

## 2025-03-14 RX ORDER — TRAZODONE HYDROCHLORIDE 150 MG/1
150 TABLET ORAL NIGHTLY
Qty: 90 TABLET | Refills: 1 | Status: SHIPPED | OUTPATIENT
Start: 2025-03-14

## 2025-04-22 ENCOUNTER — TELEPHONE (OUTPATIENT)
Dept: CARDIOLOGY | Facility: CLINIC | Age: 40
End: 2025-04-22
Payer: COMMERCIAL

## 2025-05-09 DIAGNOSIS — K21.9 GASTROESOPHAGEAL REFLUX DISEASE, UNSPECIFIED WHETHER ESOPHAGITIS PRESENT: ICD-10-CM

## 2025-05-09 RX ORDER — PANTOPRAZOLE SODIUM 20 MG/1
20 TABLET, DELAYED RELEASE ORAL
Qty: 90 TABLET | Refills: 0 | Status: SHIPPED | OUTPATIENT
Start: 2025-05-09

## 2025-05-15 ENCOUNTER — OFFICE VISIT (OUTPATIENT)
Dept: CARDIOLOGY | Facility: CLINIC | Age: 40
End: 2025-05-15
Payer: COMMERCIAL

## 2025-05-15 VITALS
BODY MASS INDEX: 27.79 KG/M2 | HEIGHT: 70 IN | SYSTOLIC BLOOD PRESSURE: 123 MMHG | HEART RATE: 83 BPM | OXYGEN SATURATION: 98 % | DIASTOLIC BLOOD PRESSURE: 73 MMHG | WEIGHT: 194.13 LBS

## 2025-05-15 DIAGNOSIS — I25.110 CORONARY ARTERY DISEASE INVOLVING NATIVE CORONARY ARTERY OF NATIVE HEART WITH UNSTABLE ANGINA PECTORIS: Primary | ICD-10-CM

## 2025-05-15 DIAGNOSIS — F51.01 PRIMARY INSOMNIA: ICD-10-CM

## 2025-05-15 DIAGNOSIS — D75.1 SECONDARY POLYCYTHEMIA: ICD-10-CM

## 2025-05-15 DIAGNOSIS — F17.219 CIGARETTE NICOTINE DEPENDENCE WITH NICOTINE-INDUCED DISORDER: ICD-10-CM

## 2025-05-15 DIAGNOSIS — R60.0 LOCALIZED EDEMA: ICD-10-CM

## 2025-05-15 DIAGNOSIS — I10 ESSENTIAL HYPERTENSION: ICD-10-CM

## 2025-05-15 DIAGNOSIS — I49.8 VENTRICULAR TRIGEMINY: ICD-10-CM

## 2025-05-15 DIAGNOSIS — E78.2 MIXED HYPERLIPIDEMIA: ICD-10-CM

## 2025-05-15 PROCEDURE — 3078F DIAST BP <80 MM HG: CPT | Mod: CPTII,S$GLB,,

## 2025-05-15 PROCEDURE — 99999 PR PBB SHADOW E&M-EST. PATIENT-LVL IV: CPT | Mod: PBBFAC,,,

## 2025-05-15 PROCEDURE — 3008F BODY MASS INDEX DOCD: CPT | Mod: CPTII,S$GLB,,

## 2025-05-15 PROCEDURE — 1159F MED LIST DOCD IN RCRD: CPT | Mod: CPTII,S$GLB,,

## 2025-05-15 PROCEDURE — 1160F RVW MEDS BY RX/DR IN RCRD: CPT | Mod: CPTII,S$GLB,,

## 2025-05-15 PROCEDURE — 3074F SYST BP LT 130 MM HG: CPT | Mod: CPTII,S$GLB,,

## 2025-05-15 PROCEDURE — 99214 OFFICE O/P EST MOD 30 MIN: CPT | Mod: S$GLB,,,

## 2025-05-15 RX ORDER — FUROSEMIDE 20 MG/1
20 TABLET ORAL DAILY PRN
Start: 2025-05-15

## 2025-05-15 NOTE — PROGRESS NOTES
Subjective:    Patient ID:  Irving Tanner is a 40 y.o. male patient here for evaluation Annual Exam (No issue stated by pt  wants to d/c some Rx )      History of Present Illness    CHIEF COMPLAINT:  Patient presents today for follow up    CARDIOVASCULAR:  He denies chest pain, dyspnea, lightheadedness, and dizziness. He takes Clopidogrel, isosorbide 1-2 times weekly, and continues Losartan and metoprolol for cardiac protection post-MI. He uses Lasix PRN for fluid retention.    SLEEP:  He reports chronic sleep issues since childhood, currently with difficulty maintaining sleep due to frequent urination from lasix. He has additional difficulty sleeping during daytime hours when working night shifts every 10 days.    MEDICATIONS:  He takes atorvastatin nightly for cholesterol management.    SOCIAL HISTORY:  Former smoker.      ROS:  General: -fever, -chills, -fatigue, -weight gain, -weight loss  Cardiovascular: -chest pain, -palpitations, -lower extremity edema  Respiratory: -cough, -shortness of breath  Gastrointestinal: -abdominal pain, -nausea, -vomiting, -diarrhea, -constipation, -blood in stool  Genitourinary: -dysuria, -hematuria, -frequency, +excessive urination  Musculoskeletal: -joint pain, -muscle pain  Skin: -rash, -lesion  Neurological: -headache, -dizziness, -numbness, -tingling, +sleep disturbances, +difficulty staying asleep  Psychiatric: -anxiety, -depression, -sleep difficulty                    Review of patient's allergies indicates:   Allergen Reactions    Motrin [ibuprofen]      swelling       Past Medical History:   Diagnosis Date    Cigarette nicotine dependence with nicotine-induced disorder 9/7/2022    Hyperlipidemia     Hypertension     Secondary polycythemia 11/6/2022     Past Surgical History:   Procedure Laterality Date    APPENDECTOMY      FOOT FRACTURE SURGERY      FRACTURE SURGERY      Incision and Drainage of Perirectal Abscess  06/22/2018        LEFT HEART  CATHETERIZATION Left 10/09/2022    Procedure: Left heart cath;  Surgeon: Jose Rafael Hernandez MD;  Location: [facility] CATH/EP LAB;  Service: Cardiology;  Laterality: Left;     Social History[1]                Objective        Vitals:    05/15/25 1041   BP: 123/73   Pulse: 83       LIPIDS - LAST 2   Lab Results   Component Value Date    CHOL 286 (H) 06/26/2024    CHOL 172 08/24/2023    HDL 39 (L) 06/26/2024    HDL 56 08/24/2023    LDLCALC 200.0 (H) 06/26/2024    LDLCALC 90.6 08/24/2023    TRIG 235 (H) 06/26/2024    TRIG 127 08/24/2023    CHOLHDL 13.6 (L) 06/26/2024    CHOLHDL 32.6 08/24/2023       CBC - LAST 2  Lab Results   Component Value Date    WBC 7.02 08/09/2024    WBC 5.72 06/26/2024    RBC 6.00 08/09/2024    RBC 6.22 (H) 06/26/2024    HGB 18.3 (H) 08/09/2024    HGB 18.3 (H) 06/26/2024    HCT 53.9 08/09/2024    HCT 57.7 (H) 06/26/2024    MCV 90 08/09/2024    MCV 93 06/26/2024    MCH 30.5 08/09/2024    MCH 29.4 06/26/2024    MCHC 34.0 08/09/2024    MCHC 31.7 (L) 06/26/2024    RDW 14.5 08/09/2024    RDW 14.5 06/26/2024     08/09/2024     06/26/2024    MPV 10.1 08/09/2024    MPV 9.9 06/26/2024    GRAN 4.4 08/09/2024    GRAN 63.0 08/09/2024    LYMPH 1.9 08/09/2024    LYMPH 27.4 08/09/2024    MONO 0.5 08/09/2024    MONO 7.3 08/09/2024    BASO 0.06 08/09/2024    BASO 0.08 06/26/2024    NRBC 0 08/09/2024    NRBC 0 06/26/2024       CHEMISTRY & LIVER FUNCTION - LAST 2  Lab Results   Component Value Date     06/26/2024     08/24/2023     08/24/2023    K 4.2 06/26/2024    K 3.8 08/24/2023    K 3.8 08/24/2023     06/26/2024     08/24/2023     08/24/2023    CO2 21 (L) 06/26/2024    CO2 23 08/24/2023    CO2 23 08/24/2023    ANIONGAP 11 06/26/2024    ANIONGAP 7 (L) 08/24/2023    ANIONGAP 7 (L) 08/24/2023    BUN 9 06/26/2024    BUN 17 08/24/2023    BUN 17 08/24/2023    CREATININE 1.2 06/26/2024    CREATININE 1.0 08/24/2023    CREATININE 1.0 08/24/2023    GLU 84 06/26/2024    GLU 97  08/24/2023    GLU 97 08/24/2023    CALCIUM 9.1 06/26/2024    CALCIUM 8.4 (L) 08/24/2023    CALCIUM 8.4 (L) 08/24/2023    MG 2.0 10/10/2022    MG 2.2 10/09/2022    ALBUMIN 3.9 06/26/2024    ALBUMIN 3.8 08/24/2023    PROT 7.2 06/26/2024    PROT 7.4 08/24/2023    ALKPHOS 40 (L) 06/26/2024    ALKPHOS 48 (L) 08/24/2023    ALT 50 (H) 06/26/2024    ALT 67 (H) 08/24/2023    AST 28 06/26/2024    AST 28 08/24/2023    BILITOT 0.6 06/26/2024    BILITOT 0.7 08/24/2023        CARDIAC PROFILE - LAST 2  Lab Results   Component Value Date    BNP 13 12/19/2022    BNP 15 09/22/2022     (H) 06/12/2020     (H) 06/12/2020    CPKMB 5.4 06/11/2020    TROPONINI 0.095 (H) 10/10/2022    TROPONINI <0.030 10/09/2022    TROPONINIHS 5.4 12/22/2022        COAGULATION - LAST 2  Lab Results   Component Value Date    LABPT 13.2 10/08/2022    LABPT 12.8 10/07/2022    INR 1.1 10/08/2022    INR 1.0 10/07/2022    APTT 58.5 (H) 10/09/2022    APTT 58.3 (H) 10/09/2022       ENDOCRINE & PSA - LAST 2  Lab Results   Component Value Date    HGBA1C 4.8 06/26/2024    TSH 1.322 06/26/2024    TSH 0.990 08/24/2023        ECHOCARDIOGRAM RESULTS  Results for orders placed during the hospital encounter of 10/07/22    Echo    Interpretation Summary  · The left ventricle is normal in size with mild concentric hypertrophy and normal systolic function.  · The estimated PA systolic pressure is 21 mmHg.  · Normal left ventricular diastolic function.  · Normal right ventricular size with normal right ventricular systolic function.  · Normal central venous pressure (3 mmHg).  · Mild tricuspid regurgitation.  · The estimated ejection fraction is 55%.  · Mild left atrial enlargement.      CURRENT/PREVIOUS VISIT EKG  Results for orders placed or performed in visit on 10/09/24   IN OFFICE EKG 12-LEAD (to [place])    Collection Time: 10/09/24 10:26 AM   Result Value Ref Range    QRS Duration 84 ms    [facility] QTC Calculation 405 ms    Narrative    Test Reason :  I25.110,    Vent. Rate : 076 BPM     Atrial Rate : 076 BPM     P-R Int : 116 ms          QRS Dur : 084 ms      QT Int : 360 ms       P-R-T Axes : 041 004 015 degrees     QTc Int : 405 ms    Normal sinus rhythm  Normal ECG  Confirmed by Gaby KNUTSON, Yolis Mancia ([id number]) on 10/24/2024 6:56:41 PM    Referred By:             Confirmed By:Yolis Griffin MD     No valid procedures specified.   Results for orders placed during the hospital encounter of 10/07/22    Nuclear Stress Test    Interpretation Summary    The EKG portion of this study is abnormal but not diagnostic.    The patient reported no chest pain during the stress test.    During stress, frequent PVCs are noted.    The nuclear portion of this study will be reported separately.    No valid procedures specified.    Physical Exam    General: No acute distress. Well-developed. Well-nourished.  HENT: Normocephalic. Atraumatic.   Cardiovascular: Regular rate. Regular rhythm. No murmurs. No rubs. No gallops. Normal S1, S2.  Respiratory: Normal respiratory effort. Clear to auscultation bilaterally. No rales. No rhonchi. No wheezing.  Abdomen: Soft. Non-tender. Non-distended. Normoactive bowel sounds.  Musculoskeletal: No  obvious deformity.  Extremities: No lower extremity edema.  Neurological: Alert & oriented x3. No slurred speech. Normal gait.  Psychiatric: Normal mood. Normal affect. Good insight. Good judgment.  Skin: Warm. Dry. No rash.         I HAVE REVIEWED :    The vital signs, nurses notes, and all the pertinent radiology and labs.        Current Outpatient Medications   Medication Instructions    aspirin (ECOTRIN) 81 mg, Oral, Daily    furosemide (LASIX) 20 mg, Oral, Daily PRN    losartan (COZAAR) 25 mg, Oral, Daily    metoprolol succinate (TOPROL-XL) 50 mg, Oral, Daily    pantoprazole (PROTONIX) 20 mg, Oral, Before breakfast    potassium chloride (KLOR-CON) 8 MEQ TbSR 8 mEq, Oral, Daily    rosuvastatin (CRESTOR) 40 mg, Oral, Nightly    traZODone  (DESYREL) 150 mg, Oral, Nightly          Assessment & Plan   Assessment & Plan    I25.110 Coronary artery disease involving native coronary artery of native heart with unstable angina pectoris  R60.0 Localized edema  I10 Essential hypertension  E78.2 Mixed hyperlipidemia  I49.8 Ventricular trigeminy  D75.1 Secondary polycythemia  F17.219 Cigarette nicotine dependence with nicotine-induced disorder  F51.01 Primary insomnia    PLAN SUMMARY:  - Ordered fasting lipid panel  - Continued Losartan, metoprolol, atorvastatin, and aspirin  - Discontinued Lasix (furosemide) for regular use; use only as needed  - Discontinued Plavix (clopidogrel)  - Discontinued isosorbide since he was not taking regularly and denies CP  - Recommend blood donation due to elevated hemoglobin and hematocrit- managed by PCP  - Will review lab results when available and adjust treatment through patient portal if necessary  - Follow up in 6 months    CORONARY ARTERY DISEASE INVOLVING NATIVE CORONARY ARTERY OF NATIVE HEART WITH UNSTABLE ANGINA PECTORIS:  - Discontinued Plavix (clopidogrel) after weighing risks and benefits.    - Discontinued isosorbide due to infrequent use and lack of noticeable effect.  - Informed about the preventative role of Plavix in heart disease progression.  Patient wishes to stop for now.   - Continued aspirin daily.  - Follow up in 6 months.  - Will review lab results when available and adjust treatment through patient portal if necessary.    LOCALIZED EDEMA:  - Discontinued Lasix (furosemide) for regular use; use only as needed given stable heart function and no current signs of fluid retention.  - Explained relationship between salt intake, fluid retention, and the action of Lasix.  - Educated on signs of fluid retention to monitor: leg swelling, increased shortness of breath when lying flat, 2-3 pound weight gain in 24 hours.  - Patient to reduce salt intake, monitor for signs of fluid retention.    ESSENTIAL  HYPERTENSION:  - Blood pressure stable.  - Continued Losartan.  - Continued metoprolol.    MIXED HYPERLIPIDEMIA:  - Continued atorvastatin for cholesterol due to its importance in long-term cardiovascular risk reduction.  - Ordered fasting lipid panel due to suspicion of non-fasting state during previous test.    SECONDARY POLYCYTHEMIA:  - Elevated hemoglobin and hematocrit noted, recommend blood donation.    LIFESTYLE CHANGES:  - Discussed daily fluid intake recommendations, emphasizing the need for about 2 L per day unless engaging in strenuous activity.               This note was generated with the assistance of ambient listening technology. Verbal consent was obtained by the patient and accompanying visitor(s) for the recording of patient appointment to facilitate this note. I attest to having reviewed and edited the generated note for accuracy, though some syntax or spelling errors may persist. Please contact the author of this note for any clarification.             [1]   Social History  Tobacco Use    Smoking status: Former     Current packs/day: 1.00     Average packs/day: 1 pack/day for 15.0 years (15.0 ttl pk-yrs)     Types: Cigarettes    Smokeless tobacco: Never   Substance Use Topics    Alcohol use: No     Comment: occasionally    Drug use: No

## 2025-05-16 LAB
OHS QRS DURATION: 110 MS
OHS QTC CALCULATION: 407 MS

## 2025-06-20 ENCOUNTER — LAB VISIT (OUTPATIENT)
Dept: LAB | Facility: HOSPITAL | Age: 40
End: 2025-06-20
Attending: NURSE PRACTITIONER
Payer: COMMERCIAL

## 2025-06-20 DIAGNOSIS — K76.0 FATTY LIVER: ICD-10-CM

## 2025-06-20 DIAGNOSIS — I25.110 CORONARY ARTERY DISEASE INVOLVING NATIVE CORONARY ARTERY OF NATIVE HEART WITH UNSTABLE ANGINA PECTORIS: ICD-10-CM

## 2025-06-20 DIAGNOSIS — I10 ESSENTIAL HYPERTENSION: ICD-10-CM

## 2025-06-20 DIAGNOSIS — E78.2 MIXED HYPERLIPIDEMIA: ICD-10-CM

## 2025-06-20 LAB
ABSOLUTE EOSINOPHIL (OHS): 0.18 K/UL
ABSOLUTE MONOCYTE (OHS): 0.46 K/UL (ref 0.3–1)
ABSOLUTE NEUTROPHIL COUNT (OHS): 2.97 K/UL (ref 1.8–7.7)
ALBUMIN SERPL BCP-MCNC: 4.1 G/DL (ref 3.5–5.2)
ALP SERPL-CCNC: 49 UNIT/L (ref 40–150)
ALT SERPL W/O P-5'-P-CCNC: 36 UNIT/L (ref 10–44)
ANION GAP (OHS): 12 MMOL/L (ref 8–16)
AST SERPL-CCNC: 29 UNIT/L (ref 11–45)
BASOPHILS # BLD AUTO: 0.06 K/UL
BASOPHILS NFR BLD AUTO: 1 %
BILIRUB SERPL-MCNC: 0.6 MG/DL (ref 0.1–1)
BUN SERPL-MCNC: 9 MG/DL (ref 6–20)
CALCIUM SERPL-MCNC: 8.9 MG/DL (ref 8.7–10.5)
CHLORIDE SERPL-SCNC: 103 MMOL/L (ref 95–110)
CHOLEST SERPL-MCNC: 145 MG/DL (ref 120–199)
CHOLEST/HDLC SERPL: 2.3 {RATIO} (ref 2–5)
CO2 SERPL-SCNC: 27 MMOL/L (ref 23–29)
CREAT SERPL-MCNC: 1.1 MG/DL (ref 0.5–1.4)
ERYTHROCYTE [DISTWIDTH] IN BLOOD BY AUTOMATED COUNT: 14.5 % (ref 11.5–14.5)
GFR SERPLBLD CREATININE-BSD FMLA CKD-EPI: >60 ML/MIN/1.73/M2
GLUCOSE SERPL-MCNC: 94 MG/DL (ref 70–110)
HCT VFR BLD AUTO: 50.5 % (ref 40–54)
HDLC SERPL-MCNC: 63 MG/DL (ref 40–75)
HDLC SERPL: 43.4 % (ref 20–50)
HGB BLD-MCNC: 16.4 GM/DL (ref 14–18)
IMM GRANULOCYTES # BLD AUTO: 0.02 K/UL (ref 0–0.04)
IMM GRANULOCYTES NFR BLD AUTO: 0.3 % (ref 0–0.5)
LDLC SERPL CALC-MCNC: 60.6 MG/DL (ref 63–159)
LYMPHOCYTES # BLD AUTO: 2.48 K/UL (ref 1–4.8)
MCH RBC QN AUTO: 30.1 PG (ref 27–31)
MCHC RBC AUTO-ENTMCNC: 32.5 G/DL (ref 32–36)
MCV RBC AUTO: 93 FL (ref 82–98)
NONHDLC SERPL-MCNC: 82 MG/DL
NUCLEATED RBC (/100WBC) (OHS): 0 /100 WBC
PLATELET # BLD AUTO: 288 K/UL (ref 150–450)
PMV BLD AUTO: 9.7 FL (ref 9.2–12.9)
POTASSIUM SERPL-SCNC: 3.9 MMOL/L (ref 3.5–5.1)
PROT SERPL-MCNC: 7 GM/DL (ref 6–8.4)
RBC # BLD AUTO: 5.45 M/UL (ref 4.6–6.2)
RELATIVE EOSINOPHIL (OHS): 2.9 %
RELATIVE LYMPHOCYTE (OHS): 40.2 % (ref 18–48)
RELATIVE MONOCYTE (OHS): 7.5 % (ref 4–15)
RELATIVE NEUTROPHIL (OHS): 48.1 % (ref 38–73)
SODIUM SERPL-SCNC: 142 MMOL/L (ref 136–145)
TRIGL SERPL-MCNC: 107 MG/DL (ref 30–150)
TSH SERPL-ACNC: 1.19 UIU/ML (ref 0.4–4)
WBC # BLD AUTO: 6.17 K/UL (ref 3.9–12.7)

## 2025-06-20 PROCEDURE — 84443 ASSAY THYROID STIM HORMONE: CPT

## 2025-06-20 PROCEDURE — 80061 LIPID PANEL: CPT

## 2025-06-20 PROCEDURE — 80053 COMPREHEN METABOLIC PANEL: CPT

## 2025-06-20 PROCEDURE — 36415 COLL VENOUS BLD VENIPUNCTURE: CPT | Mod: PN

## 2025-06-20 PROCEDURE — 85025 COMPLETE CBC W/AUTO DIFF WBC: CPT

## 2025-06-23 ENCOUNTER — OFFICE VISIT (OUTPATIENT)
Dept: FAMILY MEDICINE | Facility: CLINIC | Age: 40
End: 2025-06-23
Payer: COMMERCIAL

## 2025-06-23 VITALS
BODY MASS INDEX: 29.26 KG/M2 | SYSTOLIC BLOOD PRESSURE: 132 MMHG | DIASTOLIC BLOOD PRESSURE: 76 MMHG | HEART RATE: 88 BPM | WEIGHT: 204.38 LBS | TEMPERATURE: 99 F | HEIGHT: 70 IN | OXYGEN SATURATION: 96 %

## 2025-06-23 DIAGNOSIS — I10 ESSENTIAL HYPERTENSION: Primary | ICD-10-CM

## 2025-06-23 DIAGNOSIS — F51.01 PRIMARY INSOMNIA: ICD-10-CM

## 2025-06-23 DIAGNOSIS — E78.2 MIXED HYPERLIPIDEMIA: ICD-10-CM

## 2025-06-23 DIAGNOSIS — I25.110 CORONARY ARTERY DISEASE INVOLVING NATIVE CORONARY ARTERY OF NATIVE HEART WITH UNSTABLE ANGINA PECTORIS: ICD-10-CM

## 2025-06-23 PROCEDURE — 99999 PR PBB SHADOW E&M-EST. PATIENT-LVL IV: CPT | Mod: PBBFAC,,, | Performed by: NURSE PRACTITIONER

## 2025-06-23 PROCEDURE — 3008F BODY MASS INDEX DOCD: CPT | Mod: CPTII,S$GLB,, | Performed by: NURSE PRACTITIONER

## 2025-06-23 PROCEDURE — G2211 COMPLEX E/M VISIT ADD ON: HCPCS | Mod: 95,S$GLB,, | Performed by: NURSE PRACTITIONER

## 2025-06-23 PROCEDURE — 4010F ACE/ARB THERAPY RXD/TAKEN: CPT | Mod: CPTII,S$GLB,, | Performed by: NURSE PRACTITIONER

## 2025-06-23 PROCEDURE — 3075F SYST BP GE 130 - 139MM HG: CPT | Mod: CPTII,S$GLB,, | Performed by: NURSE PRACTITIONER

## 2025-06-23 PROCEDURE — 99214 OFFICE O/P EST MOD 30 MIN: CPT | Mod: S$GLB,,, | Performed by: NURSE PRACTITIONER

## 2025-06-23 PROCEDURE — 1159F MED LIST DOCD IN RCRD: CPT | Mod: CPTII,S$GLB,, | Performed by: NURSE PRACTITIONER

## 2025-06-23 PROCEDURE — 3078F DIAST BP <80 MM HG: CPT | Mod: CPTII,S$GLB,, | Performed by: NURSE PRACTITIONER

## 2025-06-23 PROCEDURE — 1160F RVW MEDS BY RX/DR IN RCRD: CPT | Mod: CPTII,S$GLB,, | Performed by: NURSE PRACTITIONER

## 2025-06-23 NOTE — PROGRESS NOTES
SUBJECTIVE:      Patient ID: Irving Tanner is a 40 y.o. male.    Chief Complaint: Follow-up (6 mon f/u)    History of Present Illness    CHIEF COMPLAINT:  Mr. Tanner presents for a follow-up visit to discuss recent lab results and medication management.    HPI:  Mr. Tanner's recent labs shows improved cholesterol levels. His LDL cholesterol has decreased from 200 to 60.6, which is now within the target range of under 70 for patients with a history of cardiac issues. The HDL (good cholesterol) has increased from 39 to 63. He acknowledges previously inconsistent adherence to cholesterol medication, but is now adherent to the regimen. His blood pressure has been good, and he is currently on Losartan 25 mg and metoprolol succinate 50 mg. A cardiologist evaluated him last month, discontinuing Plavix and isosorbide, while continuing baby aspirin. Lasix is now used as needed for fluid retention in the legs rather than daily. He reports feeling generally well overall. He is currently taking trazodone 150 mg at night for sleep, which has been helping.    He denies chest pain, shortness of breath, and leg swelling.    MEDICATIONS:  Mr. Tanner is on rosuvastatin 40 mg daily for cholesterol management, Losartan 25 mg and Metoprolol succinate 50 mg daily for blood pressure management. He is also taking baby aspirin daily for cardiac health and Trazodone 150 mg nightly for sleep. Plavix and isosorbide were discontinued by his cardiologist last month. Daily Lasix was changed to as needed for fluid retention in legs.    MEDICAL HISTORY:  Mr. Tanner has a history of cardiac issues. He also has a history of high cholesterol, with a previously high LDL of 200.    TEST RESULTS:  Mr. Tanner's recent cholesterol panel shows an LDL of 60.6 (previously 200) and HDL of 63 (previously 39). His recent kidney function, liver function, electrolytes, CBC (Complete Blood Count), and thyroid levels were all normal.    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   HENT:  Negative for congestion, ear pain, sinus pressure, sore throat, trouble swallowing and voice change.    Eyes:  Negative for pain, discharge and visual disturbance.   Respiratory:  Negative for cough, chest tightness, shortness of breath and wheezing.    Cardiovascular:  Negative for chest pain and palpitations.   Gastrointestinal:  Negative for abdominal pain, constipation, diarrhea, nausea and vomiting.   Genitourinary:  Negative for difficulty urinating, flank pain, frequency and urgency.   Musculoskeletal:  Negative for back pain and joint swelling.   Skin:  Negative for color change and rash.   Neurological:  Negative for dizziness, seizures, syncope, weakness, numbness and headaches.   Hematological:  Negative for adenopathy.   Psychiatric/Behavioral:  Negative for dysphoric mood and sleep disturbance. The patient is not nervous/anxious.        Family History   Problem Relation Name Age of Onset    Hypertension Mother Kathi vazquez     Diabetes Mother Kathi vazquez     Thyroid disease Mother Kathi vazquez     No Known Problems Father        Social History     Socioeconomic History    Marital status:     Number of children: 1   Tobacco Use    Smoking status: Former     Current packs/day: 1.00     Average packs/day: 1 pack/day for 15.0 years (15.0 ttl pk-yrs)     Types: Cigarettes    Smokeless tobacco: Never   Substance and Sexual Activity    Alcohol use: No     Comment: occasionally    Drug use: No    Sexual activity: Yes     Partners: Female     Birth control/protection: None     Social Drivers of Health     Financial Resource Strain: Patient Declined (4/9/2024)    Overall Financial Resource Strain (CARDIA)     Difficulty of Paying Living Expenses: Patient declined    Food Insecurity: No Food Insecurity (4/9/2024)    Hunger Vital Sign     Worried About Running Out of Food in the Last Year: Never true     Ran Out of Food in the Last Year: Never true   Transportation Needs: No Transportation Needs (4/9/2024)    PRAPARE - Transportation     Lack of Transportation (Medical): No     Lack of Transportation (Non-Medical): No   Physical Activity: Insufficiently Active (4/9/2024)    Exercise Vital Sign     Days of Exercise per Week: 3 days     Minutes of Exercise per Session: 30 min   Stress: No Stress Concern Present (4/9/2024)    [employer] - Occupational Stress Questionnaire     Feeling of Stress : Only a little   Housing Stability: Low Risk  (4/9/2024)    Housing Stability Vital Sign     Unable to Pay for Housing in the Last Year: No     Number of Places Lived in the Last Year: 1     Unstable Housing in the Last Year: No     Current Outpatient Medications   Medication Sig    furosemide (LASIX) 20 MG tablet Take 1 tablet (20 mg total) by mouth daily as needed (lower extremity edema, or 2-3 lbs weight gain in 24 hrs).    losartan (COZAAR) 25 MG tablet Take 1 tablet (25 mg total) by mouth once daily.    metoprolol succinate (TOPROL-XL) 25 MG 24 hr tablet Take 2 tablets (50 mg total) by mouth once daily.    pantoprazole (PROTONIX) 20 MG tablet Take 1 tablet (20 mg total) by mouth before breakfast.    potassium chloride (KLOR-CON) 8 MEQ TbSR Take 1 tablet (8 mEq total) by mouth once daily.    rosuvastatin (CRESTOR) 40 MG Tab TAKE 1 TABLET(40 MG) BY MOUTH EVERY EVENING    traZODone (DESYREL) 150 MG tablet Take 1 tablet (150 mg total) by mouth nightly.    aspirin (ECOTRIN) 81 MG EC tablet Take 1 tablet (81 mg total) by mouth once daily.   Last reviewed on 6/23/2025 10:35 AM by Ramesh Thomas FNP-C    Review of patient's allergies indicates:   Allergen Reactions    Motrin [ibuprofen]      swelling      Past Medical History:   Diagnosis Date     "Cigarette nicotine dependence with nicotine-induced disorder 9/7/2022    Hyperlipidemia     Hypertension     Secondary polycythemia 11/6/2022     Past Surgical History:   Procedure Laterality Date    APPENDECTOMY      FOOT FRACTURE SURGERY      FRACTURE SURGERY      Incision and Drainage of Perirectal Abscess  06/22/2018        LEFT HEART CATHETERIZATION Left 10/09/2022    Procedure: Left heart cath;  Surgeon: Jose Rafael Hernandez MD;  Location: [facility] CATH/EP LAB;  Service: Cardiology;  Laterality: Left;          OBJECTIVE:      Vitals:    06/23/25 1022   BP: 132/76   BP Location: Left arm   Patient Position: Sitting   Pulse: 88   Temp: 98.6 °F (37 °C)   TempSrc: Oral   SpO2: 96%   Weight: 92.7 kg (204 lb 5.9 oz)   Height: 5' 10" (1.778 m)     Physical Exam  Vitals and nursing note reviewed.   Constitutional:       General: He is awake. He is not in acute distress.     Appearance: He is well-developed, well-groomed and overweight. He is not ill-appearing, toxic-appearing or diaphoretic.   HENT:      Head: Normocephalic and atraumatic.      Nose: Nose normal.   Eyes:      General: Lids are normal. Gaze aligned appropriately.      Conjunctiva/sclera: Conjunctivae normal.      Right eye: Right conjunctiva is not injected.      Left eye: Left conjunctiva is not injected.      Pupils: Pupils are equal, round, and reactive to light.   Cardiovascular:      Rate and Rhythm: Normal rate and regular rhythm.      Pulses: Normal pulses.      Heart sounds: Normal heart sounds, S1 normal and S2 normal. No murmur heard.     No friction rub. No gallop.   Pulmonary:      Effort: Pulmonary effort is normal. No respiratory distress.      Breath sounds: Normal breath sounds. No stridor. No decreased breath sounds, wheezing, rhonchi or rales.   Chest:      Chest wall: No tenderness.   Musculoskeletal:      Cervical back: Neck supple.      Right lower leg: No edema.      Left lower leg: No edema.   Lymphadenopathy:      Cervical: " No cervical adenopathy.   Skin:     General: Skin is warm and dry.      Capillary Refill: Capillary refill takes less than 2 seconds.      Findings: No erythema or rash.   Neurological:      Mental Status: He is alert and oriented to person, place, and time. Mental status is at baseline.   Psychiatric:         Attention and Perception: Attention normal.         Mood and Affect: Mood normal.         Speech: Speech normal.         Behavior: Behavior normal. Behavior is cooperative.         Thought Content: Thought content normal.         Judgment: Judgment normal.       Lab Visit on 06/20/2025   Component Date Value Ref Range Status    Sodium 06/20/2025 142  136 - 145 mmol/L Final    Potassium 06/20/2025 3.9  3.5 - 5.1 mmol/L Final    Chloride 06/20/2025 103  95 - 110 mmol/L Final    CO2 06/20/2025 27  23 - 29 mmol/L Final    Glucose 06/20/2025 94  70 - 110 mg/dL Final    BUN 06/20/2025 9  6 - 20 mg/dL Final    Creatinine 06/20/2025 1.1  0.5 - 1.4 mg/dL Final    Calcium 06/20/2025 8.9  8.7 - 10.5 mg/dL Final    Protein Total 06/20/2025 7.0  6.0 - 8.4 gm/dL Final    Albumin 06/20/2025 4.1  3.5 - 5.2 g/dL Final    Bilirubin Total 06/20/2025 0.6  0.1 - 1.0 mg/dL Final    For infants and newborns, interpretation of results should be based   on gestational age, weight and in agreement with clinical   observations.    Premature Infant recommended reference ranges:   0-24 hours:  <8.0 mg/dL   24-48 hours: <12.0 mg/dL   3-5 days:    <15.0 mg/dL   6-29 days:   <15.0 mg/dL    ALP 06/20/2025 49  40 - 150 unit/L Final    AST 06/20/2025 29  11 - 45 unit/L Final    ALT 06/20/2025 36  10 - 44 unit/L Final    Anion Gap 06/20/2025 12  8 - 16 mmol/L Final    eGFR 06/20/2025 >60  >60 mL/min/1.73/m2 Final    Estimated GFR calculated using the CKD-EPI creatinine (2021) equation.    Cholesterol Total 06/20/2025 145  120 - 199 mg/dL Final    The National Cholesterol Education Program (NCEP) has set the  following guidelines (reference  ranges) for Cholesterol:  Optimal.....................<200 mg/dL  Borderline High.............200-239 mg/dL  High........................> or = 240 mg/dL    Triglyceride 06/20/2025 107  30 - 150 mg/dL Final    The National Cholesterol Education Program (NCEP) has set the  following guidelines (reference values) for triglycerides:  Normal......................<150 mg/dL  Borderline High.............150-199 mg/dL  High........................200-499 mg/dL    HDL Cholesterol 06/20/2025 63  40 - 75 mg/dL Final    The National Cholesterol Education Program (NCEP) has set the   following guidelines (reference values) for HDL Cholesterol:   Low...............<40 mg/dL   Optimal...........>60 mg/dL    LDL Cholesterol 06/20/2025 60.6 (L)  63.0 - 159.0 mg/dL Final    The National Cholesterol Education Program (NCEP) has set the  following guidelines (reference values) for LDL Cholesterol:  Optimal.......................<130 mg/dL  Borderline High...............130-159 mg/dL  High..........................160-189 mg/dL  Very High.....................>190 mg/dL  LDL calculated using the Friedewald equation.    HDL/Cholesterol Ratio 06/20/2025 43.4  20.0 - 50.0 % Final    Cholesterol/HDL Ratio 06/20/2025 2.3  2.0 - 5.0 Final    Non HDL Cholesterol 06/20/2025 82  mg/dL Final    Risk category and Non-HDL cholesterol goals:  Coronary heart disease (CHD)or equivalent (10-year risk of CHD >20%):  Non-HDL cholesterol goal     <130 mg/dL  Two or more CHD risk factors and 10-year risk of CHD <= 20%:  Non-HDL cholesterol goal     <160 mg/dL  0 to 1 CHD risk factor:  Non-HDL cholesterol goal     <190 mg/dL    TSH 06/20/2025 1.189  0.400 - 4.000 uIU/mL Final    WBC 06/20/2025 6.17  3.90 - 12.70 K/uL Final    RBC 06/20/2025 5.45  4.60 - 6.20 M/uL Final    HGB 06/20/2025 16.4  14.0 - 18.0 gm/dL Final    HCT 06/20/2025 50.5  40.0 - 54.0 % Final    MCV 06/20/2025 93  82 - 98 fL Final    MCH 06/20/2025 30.1  27.0 - 31.0 pg Final    MCHC  06/20/2025 32.5  32.0 - 36.0 g/dL Final    RDW 06/20/2025 14.5  11.5 - 14.5 % Final    Platelet Count 06/20/2025 288  150 - 450 K/uL Final    MPV 06/20/2025 9.7  9.2 - 12.9 fL Final    Nucleated RBC 06/20/2025 0  <=0 /100 WBC Final    Neut % 06/20/2025 48.1  38 - 73 % Final    Lymph % 06/20/2025 40.2  18 - 48 % Final    Mono % 06/20/2025 7.5  4 - 15 % Final    Eos % 06/20/2025 2.9  <=8 % Final    Basophil % 06/20/2025 1.0  <=1.9 % Final    Imm Grans % 06/20/2025 0.3  0.0 - 0.5 % Final    Neut # 06/20/2025 2.97  1.8 - 7.7 K/uL Final    Lymph # 06/20/2025 2.48  1 - 4.8 K/uL Final    Mono # 06/20/2025 0.46  0.3 - 1 K/uL Final    Eos # 06/20/2025 0.18  <=0.5 K/uL Final    Baso # 06/20/2025 0.06  <=0.2 K/uL Final    Imm Grans # 06/20/2025 0.02  0.00 - 0.04 K/uL Final    Mild elevation in immature granulocytes is non specific and can be seen in a variety of conditions including stress response, acute inflammation, trauma and pregnancy. Correlation with other laboratory and clinical findings is essential.          Assessment:       1. Essential hypertension    2. Mixed hyperlipidemia    3. Primary insomnia    4. Coronary artery disease involving native coronary artery of native heart with unstable angina pectoris        Plan:       Assessment & Plan    PLAN SUMMARY:  - Continue rosuvastatin 40 mg daily  - Order labs to be completed 2 days before next appointment  - Continue Trazodone 150 mg at night  - Adjust Furosemide (Lasix) from daily use to as-needed basis  - Continue Losartan 25 mg daily and Metoprolol succinate 50 mg daily  - Continue low-dose aspirin therapy  - Follow up in 6 months    HYPERTENSION:  - Blood pressure is well-controlled on current medication regimen.  - Continue Losartan 25 mg daily and Metoprolol succinate 50 mg daily.  - Reduce the amount of salt in your diet; Lose weight; Avoid drinking too much alcohol; Exercise at least 30 minutes per day most days of the week.    - Continue current medications  and home BP monitoring.    HYPERLIPIDEMIA:  - Cholesterol levels have improved significantly with LDL decreasing from 200 to 60.6 and HDL increasing from 39 to 63.  - LDL is now at target level of <70, appropriate for patient with cardiac history.  - Continue rosuvastatin 40 mg daily as it is effectively managing cholesterol levels.  - Limit red meat, butter, fried foods, cheese, and other foods that have a lot of saturated fat.   - Consume more: lean meats, fish, fruits, vegetables, whole grains, beans, lentils, and nuts.    - Recommend weight loss, and 30-45 min of cardiovascular exercise daily.    INSOMNIA:  - Mr. Tanner is sleeping adequately with current medication.  - Continue Trazodone 150 mg at night.    EDEMA:  - No current leg swelling or other symptoms of fluid retention.  - Adjust Furosemide (Lasix) from daily use to as-needed basis for fluid retention in the legs.    CORONARY ARTERY DISEASE:  - Cardiac status stable with LDL maintained under target of 70.  - Recent medication changes by cardiologist include discontinuation of Clopidogrel (Plavix) and isosorbide, with continuation of low-dose aspirin therapy.  - Continued ASA 81 mg.  - Denies chest pain, SOB, and lower extremity edema.     GENERAL HEALTH MONITORING:  - Renal function, liver function, electrolytes, CBC, and thyroid levels all within normal limits.  - Order labs to be completed 2 days before next appointment.    FOLLOW-UP:  - Follow up in 6 months.        Essential hypertension  -     CBC Auto Differential; Future; Expected date: 12/23/2025  -     Comprehensive Metabolic Panel; Future; Expected date: 12/23/2025  -     Lipid Panel; Future; Expected date: 12/23/2025  -     TSH; Future; Expected date: 12/23/2025  -     Microalbumin/Creatinine Ratio, Urine; Future; Expected date: 12/23/2025    Mixed hyperlipidemia  -     CBC Auto Differential; Future; Expected date: 12/23/2025  -     Comprehensive Metabolic Panel; Future; Expected date:  12/23/2025  -     Lipid Panel; Future; Expected date: 12/23/2025  -     TSH; Future; Expected date: 12/23/2025    Primary insomnia  -     TSH; Future; Expected date: 12/23/2025    Coronary artery disease involving native coronary artery of native heart with unstable angina pectoris  -     CBC Auto Differential; Future; Expected date: 12/23/2025  -     Comprehensive Metabolic Panel; Future; Expected date: 12/23/2025  -     Lipid Panel; Future; Expected date: 12/23/2025  -     TSH; Future; Expected date: 12/23/2025    I spent a total of 20 minutes on the day of the visit.This includes face to face time and non-face to face time preparing to see the patient (eg, review of tests), obtaining and/or reviewing separately obtained history, documenting clinical information in the electronic or other health record, independently interpreting results and communicating results to the patient/family/caregiver, or care coordinator.    Follow up in about 6 months (around 12/23/2025) for HTN and HLD.          6/23/2025 AGA Maradiaga, FAIZAN    This note was generated with the assistance of ambient listening technology. Verbal consent was obtained by the patient and accompanying visitor(s) for the recording of patient appointment to facilitate this note. I attest to having reviewed and edited the generated note for accuracy, though some syntax or spelling errors may persist. Please contact the author of this note for any clarification.

## 2025-07-21 DIAGNOSIS — K21.9 GASTROESOPHAGEAL REFLUX DISEASE, UNSPECIFIED WHETHER ESOPHAGITIS PRESENT: ICD-10-CM

## 2025-07-22 RX ORDER — PANTOPRAZOLE SODIUM 20 MG/1
20 TABLET, DELAYED RELEASE ORAL
Qty: 90 TABLET | Refills: 1 | Status: SHIPPED | OUTPATIENT
Start: 2025-07-22

## 2025-08-03 ENCOUNTER — TELEPHONE (OUTPATIENT)
Dept: PHARMACY | Facility: CLINIC | Age: 40
End: 2025-08-03
Payer: COMMERCIAL

## 2025-08-04 NOTE — TELEPHONE ENCOUNTER
[facility]/Population Health Chart Review & Patient Outreach Details For Medication Adherence Project    Reason for Outreach Encounter: 3rd Party payor non-compliance report ([employer], [employer], C, etc)  2.  Patient Outreach Method: Reviewed Patient Chart  3.   Medication in question: losartan   LAST FILLED: 6/22/25 for 90 day supply  Hypertension Medications              furosemide (LASIX) 20 MG tablet Take 1 tablet (20 mg total) by mouth daily as needed (lower extremity edema, or 2-3 lbs weight gain in 24 hrs).    losartan (COZAAR) 25 MG tablet Take 1 tablet (25 mg total) by mouth once daily.    metoprolol succinate (TOPROL-XL) 25 MG 24 hr tablet Take 2 tablets (50 mg total) by mouth once daily.              4.  Reviewed and or Updates Made To: Patient Chart  5. Outreach Outcomes and/or actions taken: Patient filled medication and is on track to be adherent

## (undated) DEVICE — GUIDEWIRE DOUBLE ENDED .035 DIA. 150CML

## (undated) DEVICE — CATHETER DIAGNOSTIC DXTERITY 6FR JL 4

## (undated) DEVICE — VALVE BLEEDBACK CONTROL 1003330

## (undated) DEVICE — SHEATH PINNACLE 6FRX10CM W/GUIDEWIRE

## (undated) DEVICE — CATHETER DIAGNOSTIC DXTERITY 5FR JR4.0

## (undated) DEVICE — CATHETER GUIDE LAUNCHER EBU4 6X110

## (undated) DEVICE — KIT INFLATION MERIT (IN8152, HP9200E)

## (undated) DEVICE — CATHETER DIAGNOSTIC DXTERITY 6F PIGST

## (undated) DEVICE — Device

## (undated) DEVICE — CATHETER GUIDE LAUNCHER JR4 6FX110

## (undated) DEVICE — GUIDEWIRE J TIP BMW .014X190